# Patient Record
Sex: FEMALE | Race: WHITE | NOT HISPANIC OR LATINO | Employment: UNEMPLOYED | URBAN - METROPOLITAN AREA
[De-identification: names, ages, dates, MRNs, and addresses within clinical notes are randomized per-mention and may not be internally consistent; named-entity substitution may affect disease eponyms.]

---

## 2018-09-13 ENCOUNTER — OFFICE VISIT (OUTPATIENT)
Dept: INTERNAL MEDICINE | Facility: CLINIC | Age: 28
End: 2018-09-13
Payer: COMMERCIAL

## 2018-09-13 VITALS
BODY MASS INDEX: 41.29 KG/M2 | SYSTOLIC BLOOD PRESSURE: 116 MMHG | RESPIRATION RATE: 18 BRPM | DIASTOLIC BLOOD PRESSURE: 80 MMHG | WEIGHT: 247.81 LBS | HEART RATE: 81 BPM | HEIGHT: 65 IN | TEMPERATURE: 98 F

## 2018-09-13 DIAGNOSIS — E66.01 MORBID OBESITY: ICD-10-CM

## 2018-09-13 DIAGNOSIS — H60.90 OTITIS EXTERNA, UNSPECIFIED CHRONICITY, UNSPECIFIED LATERALITY, UNSPECIFIED TYPE: ICD-10-CM

## 2018-09-13 DIAGNOSIS — Z00.00 ROUTINE GENERAL MEDICAL EXAMINATION AT A HEALTH CARE FACILITY: Primary | ICD-10-CM

## 2018-09-13 DIAGNOSIS — F90.9 ATTENTION DEFICIT HYPERACTIVITY DISORDER (ADHD), UNSPECIFIED ADHD TYPE: ICD-10-CM

## 2018-09-13 PROCEDURE — 99395 PREV VISIT EST AGE 18-39: CPT | Mod: S$GLB,,, | Performed by: FAMILY MEDICINE

## 2018-09-13 PROCEDURE — 99999 PR PBB SHADOW E&M-NEW PATIENT-LVL III: CPT | Mod: PBBFAC,,, | Performed by: FAMILY MEDICINE

## 2018-09-13 RX ORDER — DEXTROAMPHETAMINE SACCHARATE, AMPHETAMINE ASPARTATE MONOHYDRATE, DEXTROAMPHETAMINE SULFATE AND AMPHETAMINE SULFATE 7.5; 7.5; 7.5; 7.5 MG/1; MG/1; MG/1; MG/1
30 CAPSULE, EXTENDED RELEASE ORAL EVERY MORNING
Qty: 30 CAPSULE | Refills: 0 | Status: SHIPPED | OUTPATIENT
Start: 2018-09-13 | End: 2018-10-16 | Stop reason: SDUPTHER

## 2018-09-13 RX ORDER — IBUPROFEN 200 MG
200 TABLET ORAL EVERY 6 HOURS PRN
COMMUNITY
End: 2018-09-13

## 2018-09-13 RX ORDER — DEXTROAMPHETAMINE SACCHARATE, AMPHETAMINE ASPARTATE MONOHYDRATE, DEXTROAMPHETAMINE SULFATE AND AMPHETAMINE SULFATE 7.5; 7.5; 7.5; 7.5 MG/1; MG/1; MG/1; MG/1
30 CAPSULE, EXTENDED RELEASE ORAL EVERY MORNING
COMMUNITY
End: 2018-09-13 | Stop reason: SDUPTHER

## 2018-09-13 RX ORDER — CIPROFLOXACIN AND DEXAMETHASONE 3; 1 MG/ML; MG/ML
4 SUSPENSION/ DROPS AURICULAR (OTIC) 2 TIMES DAILY
Qty: 7.5 ML | Refills: 2 | Status: SHIPPED | OUTPATIENT
Start: 2018-09-13 | End: 2018-12-13 | Stop reason: ALTCHOICE

## 2018-09-14 PROBLEM — E66.01 MORBID OBESITY: Status: ACTIVE | Noted: 2018-09-14

## 2018-09-14 PROBLEM — H60.90 OTITIS EXTERNA: Status: ACTIVE | Noted: 2018-09-14

## 2018-09-14 PROBLEM — E66.01 MORBID OBESITY: Chronic | Status: ACTIVE | Noted: 2018-09-14

## 2018-09-14 PROBLEM — F90.9 ATTENTION DEFICIT HYPERACTIVITY DISORDER (ADHD): Status: ACTIVE | Noted: 2018-09-14

## 2018-09-14 PROBLEM — F90.9 ATTENTION DEFICIT HYPERACTIVITY DISORDER (ADHD): Chronic | Status: ACTIVE | Noted: 2018-09-14

## 2018-09-14 NOTE — PROGRESS NOTES
Subjective:   Patient ID: Bozena Queen is a 27 y.o. female.    Chief Complaint: Annual Exam and Establish Care      HPI  Mrs. Queen is a very nice 26 yo female here to est w new pcp and for annual exam.     Patient queried and denies any further complaints    PREVENTIVE MED  Diet  Exercise  Colorectal Ca  Alcohol use  Tobacco  BP  Depression  Type 2 DM  Hep C  STD  Vision  ALL REVIEWED        PAST MEDICAL HISTORY:  History reviewed. No pertinent past medical history.    PAST SURGICAL HISTORY:  History reviewed. No pertinent surgical history.    SOCIAL HISTORY:  Social History     Socioeconomic History    Marital status:      Spouse name: Not on file    Number of children: Not on file    Years of education: Not on file    Highest education level: Not on file   Social Needs    Financial resource strain: Not on file    Food insecurity - worry: Not on file    Food insecurity - inability: Not on file    Transportation needs - medical: Not on file    Transportation needs - non-medical: Not on file   Occupational History    Not on file   Tobacco Use    Smoking status: Never Smoker    Smokeless tobacco: Never Used   Substance and Sexual Activity    Alcohol use: Yes     Alcohol/week: 1.2 oz     Types: 2 Glasses of wine per week     Frequency: 2-4 times a month     Drinks per session: 1 or 2     Binge frequency: Never    Drug use: No    Sexual activity: Yes     Partners: Male     Birth control/protection: Condom   Other Topics Concern    Not on file   Social History Narrative    Not on file       FAMILY HISTORY:  Family History   Problem Relation Age of Onset    Hypertension Mother     No Known Problems Sister     No Known Problems Brother        ALLERGIES AND MEDICATIONS: updated and reviewed.  Review of patient's allergies indicates:  No Known Allergies    Current Outpatient Medications:     dextroamphetamine-amphetamine (ADDERALL XR) 30 MG 24 hr capsule, Take 1 capsule (30 mg total) by mouth  "every morning., Disp: 30 capsule, Rfl: 0    ciprofloxacin-dexamethasone 0.3-0.1% (CIPRODEX) 0.3-0.1 % DrpS, Place 4 drops into the right ear 2 (two) times daily. X 5- 7 days, Disp: 7.5 mL, Rfl: 2    Review of Systems   Constitutional: Negative for activity change, appetite change, chills, diaphoresis, fatigue, fever and unexpected weight change.   HENT: Negative for congestion, ear discharge, ear pain, facial swelling, hearing loss, nosebleeds, postnasal drip, rhinorrhea, sinus pressure, sneezing, sore throat, tinnitus, trouble swallowing and voice change.    Eyes: Negative for photophobia, pain, discharge, redness, itching and visual disturbance.   Respiratory: Negative for cough, chest tightness, shortness of breath and wheezing.    Cardiovascular: Negative for chest pain, palpitations and leg swelling.   Gastrointestinal: Negative for abdominal distention, abdominal pain, anal bleeding, blood in stool, constipation, diarrhea, nausea, rectal pain and vomiting.   Endocrine: Negative for cold intolerance, heat intolerance, polydipsia, polyphagia and polyuria.   Genitourinary: Negative for difficulty urinating, dysuria and flank pain.   Musculoskeletal: Negative for arthralgias, back pain, joint swelling, myalgias and neck pain.   Skin: Negative for rash.   Neurological: Negative for dizziness, tremors, seizures, syncope, speech difficulty, weakness, light-headedness, numbness and headaches.   Psychiatric/Behavioral: Negative for behavioral problems, confusion, decreased concentration, dysphoric mood, sleep disturbance and suicidal ideas. The patient is not nervous/anxious and is not hyperactive.        Objective:     Vitals:    09/13/18 1311   BP: 116/80   Pulse: 81   Resp: 18   Temp: 98 °F (36.7 °C)   TempSrc: Oral   Weight: 112.4 kg (247 lb 12.8 oz)   Height: 5' 5" (1.651 m)   PainSc:   3   PainLoc: Ear     Body mass index is 41.24 kg/m².    Physical Exam   Constitutional: She is oriented to person, place, and " time. She appears well-developed and well-nourished. She is cooperative. She does not have a sickly appearance. No distress.   HENT:   Head: Normocephalic and atraumatic.   Right Ear: Hearing, tympanic membrane, external ear and ear canal normal. No tenderness.   Left Ear: Hearing, tympanic membrane, external ear and ear canal normal. No tenderness.   Nose: Nose normal.   Mouth/Throat: Oropharynx is clear and moist.   Eyes: Conjunctivae and lids are normal. Pupils are equal, round, and reactive to light. Right eye exhibits no discharge. Left eye exhibits no discharge. Right conjunctiva is not injected. Left conjunctiva is not injected. No scleral icterus. Right eye exhibits normal extraocular motion. Left eye exhibits normal extraocular motion.   Neck: Normal range of motion. Neck supple. No JVD present. Carotid bruit is not present. No tracheal deviation and no edema present. No thyromegaly present.   Cardiovascular: Normal rate, regular rhythm, normal heart sounds and normal pulses. Exam reveals no friction rub.   No murmur heard.  Pulmonary/Chest: Effort normal and breath sounds normal. No accessory muscle usage. No respiratory distress. She has no wheezes. She has no rhonchi. She has no rales.   Abdominal: Soft. Bowel sounds are normal. She exhibits no distension, no abdominal bruit, no pulsatile midline mass and no mass. There is no hepatosplenomegaly. There is no tenderness. There is no rebound, no guarding, no CVA tenderness, no tenderness at McBurney's point and negative Wade's sign.   Musculoskeletal: She exhibits no edema.   Lymphadenopathy:        Head (right side): No submandibular, no preauricular and no posterior auricular adenopathy present.        Head (left side): No submandibular, no preauricular and no posterior auricular adenopathy present.     She has no cervical adenopathy.   Neurological: She is alert and oriented to person, place, and time. GCS eye subscore is 4. GCS verbal subscore is 5.  GCS motor subscore is 6.   Skin: Skin is warm and dry. No ecchymosis and no rash noted. Rash is not maculopapular and not urticarial. She is not diaphoretic. No cyanosis or erythema. Nails show no clubbing.   Psychiatric: She has a normal mood and affect. Her speech is normal and behavior is normal. Thought content normal. Her mood appears not anxious. Her affect is not angry and not inappropriate. She does not exhibit a depressed mood.   Vitals reviewed.      Assessment and Plan:   Bozena was seen today for annual exam and establish care.    Diagnoses and all orders for this visit:    Routine general medical examination at a [facility] facility    Attention deficit hyperactivity disorder (ADHD), unspecified ADHD type    BMI 40.0-44.9, adult    Morbid obesity    Otitis externa, unspecified chronicity, unspecified laterality, unspecified type    Other orders  -     dextroamphetamine-amphetamine (ADDERALL XR) 30 MG 24 hr capsule; Take 1 capsule (30 mg total) by mouth every morning.  -     ciprofloxacin-dexamethasone 0.3-0.1% (CIPRODEX) 0.3-0.1 % DrpS; Place 4 drops into the right ear 2 (two) times daily. X 5- 7 days        Follow-up in about 3 months (around 12/13/2018).      THIS NOTE WILL BE SHARED WITH THE PATIENT.

## 2018-10-16 RX ORDER — DEXTROAMPHETAMINE SACCHARATE, AMPHETAMINE ASPARTATE MONOHYDRATE, DEXTROAMPHETAMINE SULFATE AND AMPHETAMINE SULFATE 7.5; 7.5; 7.5; 7.5 MG/1; MG/1; MG/1; MG/1
30 CAPSULE, EXTENDED RELEASE ORAL EVERY MORNING
Qty: 30 CAPSULE | Refills: 0 | Status: SHIPPED | OUTPATIENT
Start: 2018-10-16 | End: 2018-11-19 | Stop reason: SDUPTHER

## 2018-11-19 RX ORDER — DEXTROAMPHETAMINE SACCHARATE, AMPHETAMINE ASPARTATE MONOHYDRATE, DEXTROAMPHETAMINE SULFATE AND AMPHETAMINE SULFATE 7.5; 7.5; 7.5; 7.5 MG/1; MG/1; MG/1; MG/1
30 CAPSULE, EXTENDED RELEASE ORAL EVERY MORNING
Qty: 30 CAPSULE | Refills: 0 | Status: SHIPPED | OUTPATIENT
Start: 2018-11-19 | End: 2018-12-13 | Stop reason: SDUPTHER

## 2018-12-13 ENCOUNTER — OFFICE VISIT (OUTPATIENT)
Dept: INTERNAL MEDICINE | Facility: CLINIC | Age: 28
End: 2018-12-13
Payer: COMMERCIAL

## 2018-12-13 DIAGNOSIS — F90.9 ATTENTION DEFICIT HYPERACTIVITY DISORDER (ADHD), UNSPECIFIED ADHD TYPE: Primary | Chronic | ICD-10-CM

## 2018-12-13 PROCEDURE — 99213 OFFICE O/P EST LOW 20 MIN: CPT | Mod: S$GLB,,, | Performed by: FAMILY MEDICINE

## 2018-12-13 PROCEDURE — 99999 PR PBB SHADOW E&M-EST. PATIENT-LVL III: CPT | Mod: PBBFAC,,, | Performed by: FAMILY MEDICINE

## 2018-12-13 PROCEDURE — 3008F BODY MASS INDEX DOCD: CPT | Mod: CPTII,S$GLB,, | Performed by: FAMILY MEDICINE

## 2018-12-13 RX ORDER — DEXTROAMPHETAMINE SACCHARATE, AMPHETAMINE ASPARTATE MONOHYDRATE, DEXTROAMPHETAMINE SULFATE AND AMPHETAMINE SULFATE 7.5; 7.5; 7.5; 7.5 MG/1; MG/1; MG/1; MG/1
30 CAPSULE, EXTENDED RELEASE ORAL EVERY MORNING
Qty: 30 CAPSULE | Refills: 0 | Status: SHIPPED | OUTPATIENT
Start: 2018-12-13 | End: 2019-01-23 | Stop reason: SDUPTHER

## 2018-12-17 VITALS
HEIGHT: 65 IN | SYSTOLIC BLOOD PRESSURE: 122 MMHG | BODY MASS INDEX: 40.11 KG/M2 | TEMPERATURE: 98 F | DIASTOLIC BLOOD PRESSURE: 84 MMHG | HEART RATE: 77 BPM | WEIGHT: 240.75 LBS

## 2018-12-17 PROBLEM — H60.90 OTITIS EXTERNA: Status: RESOLVED | Noted: 2018-09-14 | Resolved: 2018-12-17

## 2018-12-17 NOTE — PROGRESS NOTES
Subjective:   Patient ID: Bozena Queen is a 28 y.o. female.    Chief Complaint: Follow-up (3 month)      HPI  3 mo fu for adhd med.  Doing well  Patient queried and denies any further complaints.        ALLERGIES AND MEDICATIONS: updated and reviewed.  Review of patient's allergies indicates:  No Known Allergies    Current Outpatient Medications:     dextroamphetamine-amphetamine (ADDERALL XR) 30 MG 24 hr capsule, Take 1 capsule (30 mg total) by mouth every morning., Disp: 30 capsule, Rfl: 0    Review of Systems   Constitutional: Negative for activity change, appetite change, chills, diaphoresis, fatigue, fever and unexpected weight change.   HENT: Negative for congestion, ear discharge, ear pain, facial swelling, hearing loss, nosebleeds, postnasal drip, rhinorrhea, sinus pressure, sneezing, sore throat, tinnitus, trouble swallowing and voice change.    Eyes: Negative for photophobia, pain, discharge, redness, itching and visual disturbance.   Respiratory: Negative for cough, chest tightness, shortness of breath and wheezing.    Cardiovascular: Negative for chest pain, palpitations and leg swelling.   Gastrointestinal: Negative for abdominal distention, abdominal pain, anal bleeding, blood in stool, constipation, diarrhea, nausea, rectal pain and vomiting.   Endocrine: Negative for cold intolerance, heat intolerance, polydipsia, polyphagia and polyuria.   Genitourinary: Negative for difficulty urinating, dysuria and flank pain.   Musculoskeletal: Negative for arthralgias, back pain, joint swelling, myalgias and neck pain.   Skin: Negative for rash.   Neurological: Negative for dizziness, tremors, seizures, syncope, speech difficulty, weakness, light-headedness, numbness and headaches.   Psychiatric/Behavioral: Negative for behavioral problems, confusion, decreased concentration, dysphoric mood, sleep disturbance and suicidal ideas. The patient is not nervous/anxious and is not hyperactive.        Objective:  "    Vitals:    12/13/18 1548   BP: 122/84   Pulse: 77   Temp: 98.3 °F (36.8 °C)   TempSrc: Oral   Weight: 109.2 kg (240 lb 11.9 oz)   Height: 5' 5" (1.651 m)   PainSc: 0-No pain     Body mass index is 40.06 kg/m².    Physical Exam   Constitutional: She is oriented to person, place, and time. She appears well-developed and well-nourished. She is cooperative. She does not have a sickly appearance. No distress.   HENT:   Head: Normocephalic and atraumatic.   Right Ear: Hearing, tympanic membrane, external ear and ear canal normal. No tenderness.   Left Ear: Hearing, tympanic membrane, external ear and ear canal normal. No tenderness.   Nose: Nose normal.   Mouth/Throat: Oropharynx is clear and moist.   Eyes: Conjunctivae and lids are normal. Pupils are equal, round, and reactive to light. Right eye exhibits no discharge. Left eye exhibits no discharge. Right conjunctiva is not injected. Left conjunctiva is not injected. No scleral icterus. Right eye exhibits normal extraocular motion. Left eye exhibits normal extraocular motion.   Neck: Normal range of motion. Neck supple. No JVD present. Carotid bruit is not present. No tracheal deviation and no edema present. No thyromegaly present.   Cardiovascular: Normal rate, regular rhythm, normal heart sounds and normal pulses. Exam reveals no friction rub.   No murmur heard.  Pulmonary/Chest: Effort normal and breath sounds normal. No accessory muscle usage. No respiratory distress. She has no wheezes. She has no rhonchi. She has no rales.   Abdominal: Soft. Bowel sounds are normal. She exhibits no distension, no abdominal bruit, no pulsatile midline mass and no mass. There is no hepatosplenomegaly. There is no tenderness. There is no rebound, no guarding, no CVA tenderness, no tenderness at McBurney's point and negative Wade's sign.   Musculoskeletal: She exhibits no edema.   Lymphadenopathy:        Head (right side): No submandibular, no preauricular and no posterior " auricular adenopathy present.        Head (left side): No submandibular, no preauricular and no posterior auricular adenopathy present.     She has no cervical adenopathy.   Neurological: She is alert and oriented to person, place, and time. GCS eye subscore is 4. GCS verbal subscore is 5. GCS motor subscore is 6.   Skin: Skin is warm and dry. No ecchymosis and no rash noted. Rash is not maculopapular and not urticarial. She is not diaphoretic. No cyanosis or erythema. Nails show no clubbing.   Psychiatric: She has a normal mood and affect. Her speech is normal and behavior is normal. Thought content normal. Her mood appears not anxious. Her affect is not angry and not inappropriate. She does not exhibit a depressed mood.   Nursing note and vitals reviewed.      Assessment and Plan:   Bozena was seen today for follow-up.    Diagnoses and all orders for this visit:    Attention deficit hyperactivity disorder (ADHD), unspecified ADHD type    Other orders  -     dextroamphetamine-amphetamine (ADDERALL XR) 30 MG 24 hr capsule; Take 1 capsule (30 mg total) by mouth every morning.        Follow-up in about 3 months (around 3/13/2019), or if symptoms worsen or fail to improve.    THIS NOTE WILL BE SHARED WITH THE PATIENT.

## 2019-01-23 RX ORDER — DEXTROAMPHETAMINE SACCHARATE, AMPHETAMINE ASPARTATE MONOHYDRATE, DEXTROAMPHETAMINE SULFATE AND AMPHETAMINE SULFATE 7.5; 7.5; 7.5; 7.5 MG/1; MG/1; MG/1; MG/1
30 CAPSULE, EXTENDED RELEASE ORAL EVERY MORNING
Qty: 30 CAPSULE | Refills: 0 | Status: SHIPPED | OUTPATIENT
Start: 2019-01-23 | End: 2019-02-26 | Stop reason: SDUPTHER

## 2019-02-26 ENCOUNTER — TELEPHONE (OUTPATIENT)
Dept: INTERNAL MEDICINE | Facility: CLINIC | Age: 29
End: 2019-02-26

## 2019-02-26 ENCOUNTER — PATIENT MESSAGE (OUTPATIENT)
Dept: INTERNAL MEDICINE | Facility: CLINIC | Age: 29
End: 2019-02-26

## 2019-02-26 RX ORDER — DEXTROAMPHETAMINE SACCHARATE, AMPHETAMINE ASPARTATE MONOHYDRATE, DEXTROAMPHETAMINE SULFATE AND AMPHETAMINE SULFATE 7.5; 7.5; 7.5; 7.5 MG/1; MG/1; MG/1; MG/1
30 CAPSULE, EXTENDED RELEASE ORAL EVERY MORNING
Qty: 30 CAPSULE | Refills: 0 | Status: SHIPPED | OUTPATIENT
Start: 2019-02-26 | End: 2019-04-29 | Stop reason: SDUPTHER

## 2019-02-26 NOTE — TELEPHONE ENCOUNTER
----- Message from Jon Mckay MD sent at 2/26/2019 12:46 PM CST -----  ADD med refilled. She will be f/u by next refill. Please remind her thx

## 2019-04-29 ENCOUNTER — OFFICE VISIT (OUTPATIENT)
Dept: INTERNAL MEDICINE | Facility: CLINIC | Age: 29
End: 2019-04-29
Payer: COMMERCIAL

## 2019-04-29 VITALS
BODY MASS INDEX: 40.26 KG/M2 | OXYGEN SATURATION: 99 % | SYSTOLIC BLOOD PRESSURE: 108 MMHG | RESPIRATION RATE: 18 BRPM | DIASTOLIC BLOOD PRESSURE: 82 MMHG | WEIGHT: 241.63 LBS | HEART RATE: 96 BPM | HEIGHT: 65 IN

## 2019-04-29 DIAGNOSIS — F90.9 ATTENTION DEFICIT HYPERACTIVITY DISORDER (ADHD), UNSPECIFIED ADHD TYPE: Primary | Chronic | ICD-10-CM

## 2019-04-29 DIAGNOSIS — E66.01 MORBID OBESITY: Chronic | ICD-10-CM

## 2019-04-29 PROCEDURE — 3008F PR BODY MASS INDEX (BMI) DOCUMENTED: ICD-10-PCS | Mod: CPTII,S$GLB,, | Performed by: FAMILY MEDICINE

## 2019-04-29 PROCEDURE — 99999 PR PBB SHADOW E&M-EST. PATIENT-LVL III: CPT | Mod: PBBFAC,,, | Performed by: FAMILY MEDICINE

## 2019-04-29 PROCEDURE — 3008F BODY MASS INDEX DOCD: CPT | Mod: CPTII,S$GLB,, | Performed by: FAMILY MEDICINE

## 2019-04-29 PROCEDURE — 99213 PR OFFICE/OUTPT VISIT, EST, LEVL III, 20-29 MIN: ICD-10-PCS | Mod: S$GLB,,, | Performed by: FAMILY MEDICINE

## 2019-04-29 PROCEDURE — 99213 OFFICE O/P EST LOW 20 MIN: CPT | Mod: S$GLB,,, | Performed by: FAMILY MEDICINE

## 2019-04-29 PROCEDURE — 99999 PR PBB SHADOW E&M-EST. PATIENT-LVL III: ICD-10-PCS | Mod: PBBFAC,,, | Performed by: FAMILY MEDICINE

## 2019-04-29 RX ORDER — DEXTROAMPHETAMINE SACCHARATE, AMPHETAMINE ASPARTATE MONOHYDRATE, DEXTROAMPHETAMINE SULFATE AND AMPHETAMINE SULFATE 7.5; 7.5; 7.5; 7.5 MG/1; MG/1; MG/1; MG/1
30 CAPSULE, EXTENDED RELEASE ORAL EVERY MORNING
Qty: 30 CAPSULE | Refills: 0 | Status: SHIPPED | OUTPATIENT
Start: 2019-04-29 | End: 2019-06-14 | Stop reason: SDUPTHER

## 2019-04-30 NOTE — PROGRESS NOTES
Subjective:   Patient ID: Bozena Queen is a 28 y.o. female.    Chief Complaint: No chief complaint on file.      HPI  Here for add med f/u. Doing well    Patient queried and denies any further complaints.      ALLERGIES AND MEDICATIONS: updated and reviewed.  Review of patient's allergies indicates:  No Known Allergies    Current Outpatient Medications:     dextroamphetamine-amphetamine (ADDERALL XR) 30 MG 24 hr capsule, Take 1 capsule (30 mg total) by mouth every morning., Disp: 30 capsule, Rfl: 0    Review of Systems   Constitutional: Negative for activity change, appetite change, chills, diaphoresis, fatigue, fever and unexpected weight change.   HENT: Negative for congestion, ear discharge, ear pain, facial swelling, hearing loss, nosebleeds, postnasal drip, rhinorrhea, sinus pressure, sneezing, sore throat, tinnitus, trouble swallowing and voice change.    Eyes: Negative for photophobia, pain, discharge, redness, itching and visual disturbance.   Respiratory: Negative for cough, chest tightness, shortness of breath and wheezing.    Cardiovascular: Negative for chest pain, palpitations and leg swelling.   Gastrointestinal: Negative for abdominal distention, abdominal pain, anal bleeding, blood in stool, constipation, diarrhea, nausea, rectal pain and vomiting.   Endocrine: Negative for cold intolerance, heat intolerance, polydipsia, polyphagia and polyuria.   Genitourinary: Negative for difficulty urinating, dysuria and flank pain.   Musculoskeletal: Negative for arthralgias, back pain, joint swelling, myalgias and neck pain.   Skin: Negative for rash.   Neurological: Negative for dizziness, tremors, seizures, syncope, speech difficulty, weakness, light-headedness, numbness and headaches.   Psychiatric/Behavioral: Negative for behavioral problems, confusion, decreased concentration, dysphoric mood, sleep disturbance and suicidal ideas. The patient is not nervous/anxious and is not hyperactive.        Objective:  "    Vitals:    04/29/19 1550   BP: 108/82   Pulse: 96   Resp: 18   SpO2: 99%   Weight: 109.6 kg (241 lb 10 oz)   Height: 5' 5" (1.651 m)     Body mass index is 40.21 kg/m².    Physical Exam   Constitutional: She appears well-developed and well-nourished.   HENT:   Head: Normocephalic.   Skin: Skin is warm and dry.   Psychiatric: She has a normal mood and affect. Her behavior is normal.   Nursing note and vitals reviewed.      Assessment and Plan:   Diagnoses and all orders for this visit:    Attention deficit hyperactivity disorder (ADHD), unspecified ADHD type    Morbid obesity    Other orders  -     dextroamphetamine-amphetamine (ADDERALL XR) 30 MG 24 hr capsule; Take 1 capsule (30 mg total) by mouth every morning.        Follow up in about 3 months (around 7/29/2019).    THIS NOTE WILL BE SHARED WITH THE PATIENT.        "

## 2019-06-14 RX ORDER — DEXTROAMPHETAMINE SACCHARATE, AMPHETAMINE ASPARTATE MONOHYDRATE, DEXTROAMPHETAMINE SULFATE AND AMPHETAMINE SULFATE 7.5; 7.5; 7.5; 7.5 MG/1; MG/1; MG/1; MG/1
30 CAPSULE, EXTENDED RELEASE ORAL EVERY MORNING
Qty: 30 CAPSULE | Refills: 0 | Status: SHIPPED | OUTPATIENT
Start: 2019-06-14 | End: 2019-07-26 | Stop reason: SDUPTHER

## 2019-07-26 ENCOUNTER — OFFICE VISIT (OUTPATIENT)
Dept: PRIMARY CARE CLINIC | Facility: CLINIC | Age: 29
End: 2019-07-26
Payer: COMMERCIAL

## 2019-07-26 VITALS
HEART RATE: 79 BPM | HEIGHT: 65 IN | WEIGHT: 238.56 LBS | SYSTOLIC BLOOD PRESSURE: 128 MMHG | DIASTOLIC BLOOD PRESSURE: 88 MMHG | TEMPERATURE: 98 F | BODY MASS INDEX: 39.75 KG/M2

## 2019-07-26 DIAGNOSIS — E66.9 OBESITY (BMI 30-39.9): ICD-10-CM

## 2019-07-26 DIAGNOSIS — F90.0 ATTENTION DEFICIT HYPERACTIVITY DISORDER (ADHD), PREDOMINANTLY INATTENTIVE TYPE: Primary | Chronic | ICD-10-CM

## 2019-07-26 DIAGNOSIS — R10.11 RUQ ABDOMINAL PAIN: ICD-10-CM

## 2019-07-26 PROBLEM — E66.01 MORBID OBESITY: Chronic | Status: RESOLVED | Noted: 2018-09-14 | Resolved: 2019-07-26

## 2019-07-26 PROCEDURE — 99214 PR OFFICE/OUTPT VISIT, EST, LEVL IV, 30-39 MIN: ICD-10-PCS | Mod: S$GLB,,, | Performed by: FAMILY MEDICINE

## 2019-07-26 PROCEDURE — 3008F PR BODY MASS INDEX (BMI) DOCUMENTED: ICD-10-PCS | Mod: CPTII,S$GLB,, | Performed by: FAMILY MEDICINE

## 2019-07-26 PROCEDURE — 99214 OFFICE O/P EST MOD 30 MIN: CPT | Mod: S$GLB,,, | Performed by: FAMILY MEDICINE

## 2019-07-26 PROCEDURE — 99999 PR PBB SHADOW E&M-EST. PATIENT-LVL III: ICD-10-PCS | Mod: PBBFAC,,, | Performed by: FAMILY MEDICINE

## 2019-07-26 PROCEDURE — 99999 PR PBB SHADOW E&M-EST. PATIENT-LVL III: CPT | Mod: PBBFAC,,, | Performed by: FAMILY MEDICINE

## 2019-07-26 PROCEDURE — 3008F BODY MASS INDEX DOCD: CPT | Mod: CPTII,S$GLB,, | Performed by: FAMILY MEDICINE

## 2019-07-26 RX ORDER — DEXTROAMPHETAMINE SACCHARATE, AMPHETAMINE ASPARTATE MONOHYDRATE, DEXTROAMPHETAMINE SULFATE AND AMPHETAMINE SULFATE 7.5; 7.5; 7.5; 7.5 MG/1; MG/1; MG/1; MG/1
30 CAPSULE, EXTENDED RELEASE ORAL EVERY MORNING
Qty: 30 CAPSULE | Refills: 0 | Status: SHIPPED | OUTPATIENT
Start: 2019-07-26 | End: 2019-08-26 | Stop reason: SDUPTHER

## 2019-07-26 NOTE — PROGRESS NOTES
Subjective:   Patient ID: Bozena Queen is a 28 y.o. female.    Chief Complaint: ADHD (follow up)      HPI  Here for fu of add meds. Also having some intermittent and sometimes severe ruq pain eric aw eating. Has multiple family members with severe gallstones and cholecystectomies.    Patient queried and denies any further complaints.      ALLERGIES AND MEDICATIONS: updated and reviewed.  Review of patient's allergies indicates:  No Known Allergies    Current Outpatient Medications:     dextroamphetamine-amphetamine (ADDERALL XR) 30 MG 24 hr capsule, Take 1 capsule (30 mg total) by mouth every morning., Disp: 30 capsule, Rfl: 0    Review of Systems   Constitutional: Negative for activity change, appetite change, chills, diaphoresis, fatigue, fever and unexpected weight change.   HENT: Negative for congestion, ear discharge, ear pain, facial swelling, hearing loss, nosebleeds, postnasal drip, rhinorrhea, sinus pressure, sneezing, sore throat, tinnitus, trouble swallowing and voice change.    Eyes: Negative for photophobia, pain, discharge, redness, itching and visual disturbance.   Respiratory: Negative for cough, chest tightness, shortness of breath and wheezing.    Cardiovascular: Negative for chest pain, palpitations and leg swelling.   Gastrointestinal: Positive for abdominal pain (none today. see hpi). Negative for abdominal distention, anal bleeding, blood in stool, constipation, diarrhea, nausea, rectal pain and vomiting.   Endocrine: Negative for cold intolerance, heat intolerance, polydipsia, polyphagia and polyuria.   Genitourinary: Negative for difficulty urinating, dysuria and flank pain.   Musculoskeletal: Negative for arthralgias, back pain, joint swelling, myalgias and neck pain.   Skin: Negative for rash.   Neurological: Negative for dizziness, tremors, seizures, syncope, speech difficulty, weakness, light-headedness, numbness and headaches.   Psychiatric/Behavioral: Negative for behavioral problems,  "confusion, decreased concentration, dysphoric mood, sleep disturbance and suicidal ideas. The patient is not nervous/anxious and is not hyperactive.        Objective:     Vitals:    07/26/19 0940   BP: 128/88   Pulse: 79   Temp: 97.9 °F (36.6 °C)   TempSrc: Oral   Weight: 108.2 kg (238 lb 8.6 oz)   Height: 5' 5" (1.651 m)   PainSc: 0-No pain     Body mass index is 39.69 kg/m².    Physical Exam   Constitutional: She is oriented to person, place, and time. She appears well-developed and well-nourished. She is cooperative. She does not have a sickly appearance. No distress.   HENT:   Head: Normocephalic and atraumatic.   Right Ear: Hearing, tympanic membrane, external ear and ear canal normal. No tenderness.   Left Ear: Hearing, tympanic membrane, external ear and ear canal normal. No tenderness.   Nose: Nose normal.   Mouth/Throat: Oropharynx is clear and moist. Normal dentition. No oropharyngeal exudate, posterior oropharyngeal edema or posterior oropharyngeal erythema.   Eyes: Conjunctivae and lids are normal. Right eye exhibits no discharge. Left eye exhibits no discharge. Right conjunctiva is not injected. Left conjunctiva is not injected. No scleral icterus. Right eye exhibits normal extraocular motion. Left eye exhibits normal extraocular motion.   Neck: Normal range of motion. Neck supple. No JVD present. Carotid bruit is not present. No tracheal deviation and no edema present. No thyromegaly present.   Cardiovascular: Normal rate, regular rhythm, normal heart sounds and normal pulses. Exam reveals no friction rub.   No murmur heard.  Pulmonary/Chest: Effort normal and breath sounds normal. No accessory muscle usage. No respiratory distress. She has no wheezes. She has no rhonchi. She has no rales.   Abdominal: Soft. Bowel sounds are normal. She exhibits no distension. There is tenderness.   Musculoskeletal: She exhibits no edema.   Lymphadenopathy:        Head (right side): No submandibular adenopathy present. "        Head (left side): No submandibular adenopathy present.     She has no cervical adenopathy.   Neurological: She is alert and oriented to person, place, and time.   Skin: Skin is warm and dry. She is not diaphoretic.   Psychiatric: Her speech is normal and behavior is normal. Thought content normal. Her mood appears not anxious. Her affect is not angry, not labile and not inappropriate. She does not exhibit a depressed mood.   Nursing note and vitals reviewed.      Assessment and Plan:   Bozena was seen today for adhd.    Diagnoses and all orders for this visit:    Attention deficit hyperactivity disorder (ADHD), predominantly inattentive type    RUQ abdominal pain    Obesity (BMI 30-39.9)    Other orders  -     dextroamphetamine-amphetamine (ADDERALL XR) 30 MG 24 hr capsule; Take 1 capsule (30 mg total) by mouth every morning.      Wt loss, low fat diet, consider abd u/s and fasting labs.      Follow up in about 3 months (around 10/26/2019).    THIS NOTE WILL BE SHARED WITH THE PATIENT.

## 2019-08-26 RX ORDER — DEXTROAMPHETAMINE SACCHARATE, AMPHETAMINE ASPARTATE MONOHYDRATE, DEXTROAMPHETAMINE SULFATE AND AMPHETAMINE SULFATE 7.5; 7.5; 7.5; 7.5 MG/1; MG/1; MG/1; MG/1
30 CAPSULE, EXTENDED RELEASE ORAL EVERY MORNING
Qty: 30 CAPSULE | Refills: 0 | Status: SHIPPED | OUTPATIENT
Start: 2019-08-26 | End: 2019-09-30 | Stop reason: SDUPTHER

## 2019-10-01 RX ORDER — DEXTROAMPHETAMINE SACCHARATE, AMPHETAMINE ASPARTATE MONOHYDRATE, DEXTROAMPHETAMINE SULFATE AND AMPHETAMINE SULFATE 7.5; 7.5; 7.5; 7.5 MG/1; MG/1; MG/1; MG/1
30 CAPSULE, EXTENDED RELEASE ORAL EVERY MORNING
Qty: 30 CAPSULE | Refills: 0 | Status: SHIPPED | OUTPATIENT
Start: 2019-10-01 | End: 2019-11-04 | Stop reason: SDUPTHER

## 2019-10-21 ENCOUNTER — PATIENT OUTREACH (OUTPATIENT)
Dept: ADMINISTRATIVE | Facility: HOSPITAL | Age: 29
End: 2019-10-21

## 2019-10-21 DIAGNOSIS — Z13.220 ENCOUNTER FOR LIPID SCREENING FOR CARDIOVASCULAR DISEASE: Primary | ICD-10-CM

## 2019-10-21 DIAGNOSIS — Z13.6 ENCOUNTER FOR LIPID SCREENING FOR CARDIOVASCULAR DISEASE: Primary | ICD-10-CM

## 2019-10-21 NOTE — PROGRESS NOTES
Pre-visit chart review completed. Pt eligible/ due for  Topic    Lipid Panel     Pap Smear with HPV Cotest     Influenza Vaccine (1)     Lipid panel ordered.    Message sent to patient in regards to pap test.

## 2019-11-04 ENCOUNTER — LAB VISIT (OUTPATIENT)
Dept: LAB | Facility: HOSPITAL | Age: 29
End: 2019-11-04
Attending: FAMILY MEDICINE
Payer: COMMERCIAL

## 2019-11-04 ENCOUNTER — OFFICE VISIT (OUTPATIENT)
Dept: PRIMARY CARE CLINIC | Facility: CLINIC | Age: 29
End: 2019-11-04
Payer: COMMERCIAL

## 2019-11-04 VITALS
WEIGHT: 240.94 LBS | DIASTOLIC BLOOD PRESSURE: 84 MMHG | BODY MASS INDEX: 40.14 KG/M2 | TEMPERATURE: 99 F | HEIGHT: 65 IN | HEART RATE: 74 BPM | OXYGEN SATURATION: 99 % | SYSTOLIC BLOOD PRESSURE: 130 MMHG

## 2019-11-04 DIAGNOSIS — F90.0 ATTENTION DEFICIT HYPERACTIVITY DISORDER (ADHD), PREDOMINANTLY INATTENTIVE TYPE: ICD-10-CM

## 2019-11-04 DIAGNOSIS — Z23 NEED FOR INFLUENZA VACCINATION: ICD-10-CM

## 2019-11-04 DIAGNOSIS — Z12.83 SKIN CANCER SCREENING: ICD-10-CM

## 2019-11-04 DIAGNOSIS — Z00.00 ROUTINE MEDICAL EXAM: Primary | ICD-10-CM

## 2019-11-04 DIAGNOSIS — Z00.00 ROUTINE MEDICAL EXAM: ICD-10-CM

## 2019-11-04 PROBLEM — E66.9 OBESITY (BMI 30-39.9): Status: RESOLVED | Noted: 2019-07-26 | Resolved: 2019-11-04

## 2019-11-04 PROBLEM — R10.11 RUQ ABDOMINAL PAIN: Status: RESOLVED | Noted: 2019-07-26 | Resolved: 2019-11-04

## 2019-11-04 LAB
ALBUMIN SERPL BCP-MCNC: 3.8 G/DL (ref 3.5–5.2)
ALP SERPL-CCNC: 68 U/L (ref 55–135)
ALT SERPL W/O P-5'-P-CCNC: 11 U/L (ref 10–44)
ANION GAP SERPL CALC-SCNC: 6 MMOL/L (ref 8–16)
AST SERPL-CCNC: 17 U/L (ref 10–40)
BASOPHILS # BLD AUTO: 0.04 K/UL (ref 0–0.2)
BASOPHILS NFR BLD: 0.5 % (ref 0–1.9)
BILIRUB SERPL-MCNC: 0.5 MG/DL (ref 0.1–1)
BUN SERPL-MCNC: 9 MG/DL (ref 6–20)
CALCIUM SERPL-MCNC: 9.5 MG/DL (ref 8.7–10.5)
CHLORIDE SERPL-SCNC: 105 MMOL/L (ref 95–110)
CHOLEST SERPL-MCNC: 180 MG/DL (ref 120–199)
CHOLEST/HDLC SERPL: 3.9 {RATIO} (ref 2–5)
CO2 SERPL-SCNC: 26 MMOL/L (ref 23–29)
CREAT SERPL-MCNC: 0.8 MG/DL (ref 0.5–1.4)
DIFFERENTIAL METHOD: ABNORMAL
EOSINOPHIL # BLD AUTO: 0.1 K/UL (ref 0–0.5)
EOSINOPHIL NFR BLD: 1.3 % (ref 0–8)
ERYTHROCYTE [DISTWIDTH] IN BLOOD BY AUTOMATED COUNT: 13.1 % (ref 11.5–14.5)
EST. GFR  (AFRICAN AMERICAN): >60 ML/MIN/1.73 M^2
EST. GFR  (NON AFRICAN AMERICAN): >60 ML/MIN/1.73 M^2
ESTIMATED AVG GLUCOSE: 100 MG/DL (ref 68–131)
GLUCOSE SERPL-MCNC: 99 MG/DL (ref 70–110)
HBA1C MFR BLD HPLC: 5.1 % (ref 4–5.6)
HCT VFR BLD AUTO: 40.8 % (ref 37–48.5)
HDLC SERPL-MCNC: 46 MG/DL (ref 40–75)
HDLC SERPL: 25.6 % (ref 20–50)
HGB BLD-MCNC: 13 G/DL (ref 12–16)
IMM GRANULOCYTES # BLD AUTO: 0.02 K/UL (ref 0–0.04)
IMM GRANULOCYTES NFR BLD AUTO: 0.3 % (ref 0–0.5)
LDLC SERPL CALC-MCNC: 109.2 MG/DL (ref 63–159)
LYMPHOCYTES # BLD AUTO: 1.6 K/UL (ref 1–4.8)
LYMPHOCYTES NFR BLD: 20.2 % (ref 18–48)
MCH RBC QN AUTO: 29 PG (ref 27–31)
MCHC RBC AUTO-ENTMCNC: 31.9 G/DL (ref 32–36)
MCV RBC AUTO: 91 FL (ref 82–98)
MONOCYTES # BLD AUTO: 0.9 K/UL (ref 0.3–1)
MONOCYTES NFR BLD: 10.7 % (ref 4–15)
NEUTROPHILS # BLD AUTO: 5.3 K/UL (ref 1.8–7.7)
NEUTROPHILS NFR BLD: 67 % (ref 38–73)
NONHDLC SERPL-MCNC: 134 MG/DL
NRBC BLD-RTO: 0 /100 WBC
PLATELET # BLD AUTO: 381 K/UL (ref 150–350)
PMV BLD AUTO: 9.9 FL (ref 9.2–12.9)
POTASSIUM SERPL-SCNC: 4.3 MMOL/L (ref 3.5–5.1)
PROT SERPL-MCNC: 7.4 G/DL (ref 6–8.4)
RBC # BLD AUTO: 4.49 M/UL (ref 4–5.4)
SODIUM SERPL-SCNC: 137 MMOL/L (ref 136–145)
TRIGL SERPL-MCNC: 124 MG/DL (ref 30–150)
TSH SERPL DL<=0.005 MIU/L-ACNC: 1.94 UIU/ML (ref 0.4–4)
WBC # BLD AUTO: 7.92 K/UL (ref 3.9–12.7)

## 2019-11-04 PROCEDURE — 99395 PREV VISIT EST AGE 18-39: CPT | Mod: 25,S$GLB,, | Performed by: FAMILY MEDICINE

## 2019-11-04 PROCEDURE — 90686 IIV4 VACC NO PRSV 0.5 ML IM: CPT | Mod: S$GLB,,, | Performed by: FAMILY MEDICINE

## 2019-11-04 PROCEDURE — 36415 COLL VENOUS BLD VENIPUNCTURE: CPT | Mod: PN

## 2019-11-04 PROCEDURE — 85025 COMPLETE CBC W/AUTO DIFF WBC: CPT

## 2019-11-04 PROCEDURE — 83036 HEMOGLOBIN GLYCOSYLATED A1C: CPT

## 2019-11-04 PROCEDURE — 80053 COMPREHEN METABOLIC PANEL: CPT

## 2019-11-04 PROCEDURE — 90471 FLU VACCINE (QUAD) GREATER THAN OR EQUAL TO 3YO PRESERVATIVE FREE IM: ICD-10-PCS | Mod: S$GLB,,, | Performed by: FAMILY MEDICINE

## 2019-11-04 PROCEDURE — 90686 FLU VACCINE (QUAD) GREATER THAN OR EQUAL TO 3YO PRESERVATIVE FREE IM: ICD-10-PCS | Mod: S$GLB,,, | Performed by: FAMILY MEDICINE

## 2019-11-04 PROCEDURE — 80061 LIPID PANEL: CPT

## 2019-11-04 PROCEDURE — 90471 IMMUNIZATION ADMIN: CPT | Mod: S$GLB,,, | Performed by: FAMILY MEDICINE

## 2019-11-04 PROCEDURE — 99395 PR PREVENTIVE VISIT,EST,18-39: ICD-10-PCS | Mod: 25,S$GLB,, | Performed by: FAMILY MEDICINE

## 2019-11-04 PROCEDURE — 99999 PR PBB SHADOW E&M-EST. PATIENT-LVL IV: ICD-10-PCS | Mod: PBBFAC,,, | Performed by: FAMILY MEDICINE

## 2019-11-04 PROCEDURE — 99999 PR PBB SHADOW E&M-EST. PATIENT-LVL IV: CPT | Mod: PBBFAC,,, | Performed by: FAMILY MEDICINE

## 2019-11-04 PROCEDURE — 84443 ASSAY THYROID STIM HORMONE: CPT

## 2019-11-04 RX ORDER — DEXTROAMPHETAMINE SACCHARATE, AMPHETAMINE ASPARTATE MONOHYDRATE, DEXTROAMPHETAMINE SULFATE AND AMPHETAMINE SULFATE 7.5; 7.5; 7.5; 7.5 MG/1; MG/1; MG/1; MG/1
30 CAPSULE, EXTENDED RELEASE ORAL EVERY MORNING
Qty: 30 CAPSULE | Refills: 0 | Status: SHIPPED | OUTPATIENT
Start: 2019-11-04 | End: 2019-12-17 | Stop reason: SDUPTHER

## 2019-11-04 NOTE — PROGRESS NOTES
Subjective:   Patient ID: Bozena Queen is a 29 y.o. female.    Chief Complaint: Annual Exam      HPI  30 yo female with ADD here for annual exam. Doing very well.    Patient queried and denies any further complaints.    PREVENTIVE MED  Diet  Exercise  Alcohol use  Tobacco  BP  Type 2 DM  Vision  ALL REVIEWED      ALLERGIES AND MEDICATIONS: updated and reviewed.  Review of patient's allergies indicates:  No Known Allergies    Current Outpatient Medications:     dextroamphetamine-amphetamine (ADDERALL XR) 30 MG 24 hr capsule, Take 1 capsule (30 mg total) by mouth every morning., Disp: 30 capsule, Rfl: 0    Review of Systems   Constitutional: Negative for activity change, appetite change, chills, diaphoresis, fatigue, fever and unexpected weight change.   HENT: Negative for congestion, ear discharge, ear pain, facial swelling, hearing loss, nosebleeds, postnasal drip, rhinorrhea, sinus pressure, sneezing, sore throat, tinnitus, trouble swallowing and voice change.    Eyes: Negative for photophobia, pain, discharge, redness, itching and visual disturbance.   Respiratory: Negative for cough, chest tightness, shortness of breath and wheezing.    Cardiovascular: Negative for chest pain, palpitations and leg swelling.   Gastrointestinal: Negative for abdominal distention, abdominal pain, anal bleeding, blood in stool, constipation, diarrhea, nausea, rectal pain and vomiting.   Endocrine: Negative for cold intolerance, heat intolerance, polydipsia, polyphagia and polyuria.   Genitourinary: Negative for difficulty urinating, dysuria and flank pain.   Musculoskeletal: Negative for arthralgias, back pain, joint swelling, myalgias and neck pain.   Skin: Negative for rash.   Neurological: Negative for dizziness, tremors, seizures, syncope, speech difficulty, weakness, light-headedness, numbness and headaches.   Psychiatric/Behavioral: Negative for behavioral problems, confusion, decreased concentration, dysphoric mood, sleep  "disturbance and suicidal ideas. The patient is not nervous/anxious and is not hyperactive.        Objective:     Vitals:    11/04/19 0724   BP: 130/84   Pulse: 74   Temp: 98.6 °F (37 °C)   TempSrc: Oral   SpO2: 99%   Weight: 109.3 kg (240 lb 15.4 oz)   Height: 5' 5" (1.651 m)   PainSc: 0-No pain     Body mass index is 40.1 kg/m².    Physical Exam   Constitutional: She is oriented to person, place, and time. She appears well-developed and well-nourished. She is cooperative. She does not have a sickly appearance. No distress.   HENT:   Head: Normocephalic and atraumatic.   Right Ear: Hearing, tympanic membrane, external ear and ear canal normal. No tenderness.   Left Ear: Hearing, tympanic membrane, external ear and ear canal normal. No tenderness.   Nose: Nose normal.   Mouth/Throat: Oropharynx is clear and moist.   Eyes: Pupils are equal, round, and reactive to light. Conjunctivae and lids are normal. Right eye exhibits no discharge. Left eye exhibits no discharge. Right conjunctiva is not injected. Left conjunctiva is not injected. No scleral icterus. Right eye exhibits normal extraocular motion. Left eye exhibits normal extraocular motion.   Neck: Normal range of motion. Neck supple. No JVD present. Carotid bruit is not present. No tracheal deviation and no edema present. No thyromegaly present.   Cardiovascular: Normal rate, regular rhythm, normal heart sounds and normal pulses. Exam reveals no friction rub.   No murmur heard.  Pulmonary/Chest: Effort normal and breath sounds normal. No accessory muscle usage. No respiratory distress. She has no wheezes. She has no rhonchi. She has no rales.   Abdominal: Soft. Bowel sounds are normal. She exhibits no distension, no abdominal bruit, no pulsatile midline mass and no mass. There is no hepatosplenomegaly. There is no tenderness. There is no rebound, no guarding, no CVA tenderness, no tenderness at McBurney's point and negative Wade's sign.   Musculoskeletal: She " exhibits no edema.   Lymphadenopathy:        Head (right side): No submandibular, no preauricular and no posterior auricular adenopathy present.        Head (left side): No submandibular, no preauricular and no posterior auricular adenopathy present.     She has no cervical adenopathy.   Neurological: She is alert and oriented to person, place, and time. GCS eye subscore is 4. GCS verbal subscore is 5. GCS motor subscore is 6.   Skin: Skin is warm and dry. No ecchymosis and no rash noted. Rash is not maculopapular and not urticarial. She is not diaphoretic. No cyanosis or erythema. Nails show no clubbing.   Psychiatric: She has a normal mood and affect. Her speech is normal and behavior is normal. Thought content normal. Her mood appears not anxious. Her affect is not angry and not inappropriate. She does not exhibit a depressed mood.   Nursing note and vitals reviewed.      Assessment and Plan:   Bozena was seen today for annual exam.    Diagnoses and all orders for this visit:    Routine medical exam  -     CBC auto differential; Future  -     Comprehensive metabolic panel; Future  -     Hemoglobin A1c; Future  -     Lipid panel; Future  -     TSH; Future  -     Ambulatory referral to Optometry    Attention deficit hyperactivity disorder (ADHD), predominantly inattentive type    Need for influenza vaccination  -     Influenza - Quadrivalent (6 months+) (PF)    Skin cancer screening  -     Ambulatory consult to Dermatology    BMI 39.0-39.9,adult    Other orders  -     dextroamphetamine-amphetamine (ADDERALL XR) 30 MG 24 hr capsule; Take 1 capsule (30 mg total) by mouth every morning.        No follow-ups on file.    THIS NOTE WILL BE SHARED WITH THE PATIENT.

## 2019-11-05 ENCOUNTER — PATIENT MESSAGE (OUTPATIENT)
Dept: PRIMARY CARE CLINIC | Facility: CLINIC | Age: 29
End: 2019-11-05

## 2019-11-20 ENCOUNTER — TELEPHONE (OUTPATIENT)
Dept: ADMINISTRATIVE | Facility: OTHER | Age: 29
End: 2019-11-20

## 2019-11-20 NOTE — TELEPHONE ENCOUNTER
Left voice message for patient to return call to schedule appointment from referral to Optometry department.  Danielle REINOSO [phone]

## 2019-12-17 RX ORDER — DEXTROAMPHETAMINE SACCHARATE, AMPHETAMINE ASPARTATE MONOHYDRATE, DEXTROAMPHETAMINE SULFATE AND AMPHETAMINE SULFATE 7.5; 7.5; 7.5; 7.5 MG/1; MG/1; MG/1; MG/1
30 CAPSULE, EXTENDED RELEASE ORAL EVERY MORNING
Qty: 30 CAPSULE | Refills: 0 | Status: SHIPPED | OUTPATIENT
Start: 2019-12-17 | End: 2020-01-22 | Stop reason: SDUPTHER

## 2020-01-13 ENCOUNTER — OFFICE VISIT (OUTPATIENT)
Dept: URGENT CARE | Facility: CLINIC | Age: 30
End: 2020-01-13
Payer: COMMERCIAL

## 2020-01-13 VITALS
OXYGEN SATURATION: 99 % | TEMPERATURE: 98 F | BODY MASS INDEX: 40.4 KG/M2 | SYSTOLIC BLOOD PRESSURE: 156 MMHG | RESPIRATION RATE: 20 BRPM | DIASTOLIC BLOOD PRESSURE: 91 MMHG | HEIGHT: 65 IN | HEART RATE: 89 BPM | WEIGHT: 242.5 LBS

## 2020-01-13 DIAGNOSIS — R30.0 DYSURIA: Primary | ICD-10-CM

## 2020-01-13 DIAGNOSIS — N30.01 ACUTE CYSTITIS WITH HEMATURIA: ICD-10-CM

## 2020-01-13 LAB
BILIRUB UR QL STRIP: POSITIVE
GLUCOSE UR QL STRIP: POSITIVE
KETONES UR QL STRIP: NEGATIVE
LEUKOCYTE ESTERASE UR QL STRIP: NEGATIVE
PH, POC UA: 5 (ref 5–8)
POC BLOOD, URINE: NEGATIVE
POC NITRATES, URINE: POSITIVE
PROT UR QL STRIP: POSITIVE
SP GR UR STRIP: 1.02 (ref 1–1.03)
UROBILINOGEN UR STRIP-ACNC: ABNORMAL (ref 0.1–1.1)

## 2020-01-13 PROCEDURE — 87086 URINE CULTURE/COLONY COUNT: CPT

## 2020-01-13 PROCEDURE — 81003 URINALYSIS AUTO W/O SCOPE: CPT | Mod: QW,S$GLB,, | Performed by: NURSE PRACTITIONER

## 2020-01-13 PROCEDURE — 99213 OFFICE O/P EST LOW 20 MIN: CPT | Mod: 25,S$GLB,, | Performed by: NURSE PRACTITIONER

## 2020-01-13 PROCEDURE — 99213 PR OFFICE/OUTPT VISIT, EST, LEVL III, 20-29 MIN: ICD-10-PCS | Mod: 25,S$GLB,, | Performed by: NURSE PRACTITIONER

## 2020-01-13 PROCEDURE — 81003 POCT URINALYSIS, DIPSTICK, AUTOMATED, W/O SCOPE: ICD-10-PCS | Mod: QW,S$GLB,, | Performed by: NURSE PRACTITIONER

## 2020-01-13 RX ORDER — SULFAMETHOXAZOLE AND TRIMETHOPRIM 800; 160 MG/1; MG/1
1 TABLET ORAL 2 TIMES DAILY
Qty: 6 TABLET | Refills: 0 | Status: SHIPPED | OUTPATIENT
Start: 2020-01-13 | End: 2020-01-16

## 2020-01-13 NOTE — PROGRESS NOTES
"Subjective:       Patient ID: Bozena Queen is a 29 y.o. female.    Vitals:  height is 5' 5" (1.651 m) and weight is 110 kg (242 lb 8.1 oz). Her temperature is 98.3 °F (36.8 °C). Her blood pressure is 156/91 (abnormal) and her pulse is 89. Her respiration is 20 and oxygen saturation is 99%.     Chief Complaint: Urinary Tract Infection    Urinary Tract Infection    This is a new problem. The current episode started yesterday. The problem occurs every urination. The problem has been unchanged. The quality of the pain is described as burning. The pain is at a severity of 3/10. The pain is mild. There has been no fever. She is sexually active. There is no history of pyelonephritis. Associated symptoms include frequency. Pertinent negatives include no chills, hematuria, nausea, urgency, vomiting or rash. She has tried nothing for the symptoms.       Constitution: Negative for chills and fever.   Neck: Negative for painful lymph nodes.   Gastrointestinal: Negative for abdominal pain, nausea and vomiting.   Genitourinary: Positive for dysuria and frequency. Negative for urgency, urine decreased, hematuria, history of kidney stones, painful menstruation, irregular menstruation, missed menses, heavy menstrual bleeding, ovarian cysts, genital trauma, vaginal pain, vaginal discharge, vaginal bleeding, vaginal odor, painful intercourse, genital sore, painful ejaculation and pelvic pain.   Musculoskeletal: Negative for back pain.   Skin: Negative for rash and lesion.   Hematologic/Lymphatic: Negative for swollen lymph nodes.       Objective:      Physical Exam      Assessment:       1. Dysuria    2. Acute cystitis with hematuria        Plan:         Dysuria  -     POCT Urinalysis, Dipstick, Automated, W/O Scope    Acute cystitis with hematuria          Patient Instructions     Hematuria: Possible Causes     Many things can lead to blood in the urine (hematuria). The blood may be seen with the eye (macroscopic or gross hematuria). " Or it may only be seen when the urine is looked at under a microscope (microscopic hematuria). Some of the most common causes of blood in the urine are listed below. Often, no cause for the blood can be found. This is called idiopathic hematuria.  · Kidney or bladder stones are collections of crystals. They form in the urine. Stones may be found anywhere in the urinary tract. But they form most often in the kidneys or bladder. In addition to blood in the urine, they can cause severe pain.  · BPH stands for benign prostatic hyperplasia. It is enlargement of the prostate gland. It happens as men age. BPH often causes problems with urination. It sometimes causes blood in the urine.  · A urinary tract infection (UTI) is due to bacteria growing in the urinary tract. It can cause blood in the urine. Other symptoms include burning or pain with urination. You may need to urinate often or urgently. You may also have a fever.  · Damage to the urinary tract may cause blood in the urine. This damage may be due to a blow or accident. It may also result from the use of a urinary catheter. Very hard exercise may sometimes irritate the urinary tract and cause bleeding.  · Cancer may occur anywhere in the urinary tract. A tumor may sometimes cause no symptoms other than bleeding.     Other possible causes of bleeding include:  · Prostatitis (infection of the prostate gland)  · Taking anticoagulants  · Blockage in the urinary tract  · Disease or inflammation of the kidney  · Cystic diseases of the kidneys  · Sickle cell anemia  · Vigorous exercise  · Endometriosis  Date Last Reviewed: 12/1/2016  © 7700-4335 [employer]. [address] [place] [address], [place] [address]. All rights reserved. This information is not intended as a substitute for professional medical care. Always follow your healthcare professional's instructions.

## 2020-01-13 NOTE — PATIENT INSTRUCTIONS
Hematuria: Possible Causes     Many things can lead to blood in the urine (hematuria). The blood may be seen with the eye (macroscopic or gross hematuria). Or it may only be seen when the urine is looked at under a microscope (microscopic hematuria). Some of the most common causes of blood in the urine are listed below. Often, no cause for the blood can be found. This is called idiopathic hematuria.  · Kidney or bladder stones are collections of crystals. They form in the urine. Stones may be found anywhere in the urinary tract. But they form most often in the kidneys or bladder. In addition to blood in the urine, they can cause severe pain.  · BPH stands for benign prostatic hyperplasia. It is enlargement of the prostate gland. It happens as men age. BPH often causes problems with urination. It sometimes causes blood in the urine.  · A urinary tract infection (UTI) is due to bacteria growing in the urinary tract. It can cause blood in the urine. Other symptoms include burning or pain with urination. You may need to urinate often or urgently. You may also have a fever.  · Damage to the urinary tract may cause blood in the urine. This damage may be due to a blow or accident. It may also result from the use of a urinary catheter. Very hard exercise may sometimes irritate the urinary tract and cause bleeding.  · Cancer may occur anywhere in the urinary tract. A tumor may sometimes cause no symptoms other than bleeding.     Other possible causes of bleeding include:  · Prostatitis (infection of the prostate gland)  · Taking anticoagulants  · Blockage in the urinary tract  · Disease or inflammation of the kidney  · Cystic diseases of the kidneys  · Sickle cell anemia  · Vigorous exercise  · Endometriosis  Date Last Reviewed: 12/1/2016 © 2000-2017 [employer]. [address] [place] [id number]. All rights reserved. This information is not intended as a substitute for professional medical care.  Always follow your healthcare professional's instructions.

## 2020-01-15 ENCOUNTER — TELEPHONE (OUTPATIENT)
Dept: URGENT CARE | Facility: CLINIC | Age: 30
End: 2020-01-15

## 2020-01-15 LAB — BACTERIA UR CULT: NORMAL

## 2020-01-16 ENCOUNTER — TELEPHONE (OUTPATIENT)
Dept: URGENT CARE | Facility: CLINIC | Age: 30
End: 2020-01-16

## 2020-01-16 NOTE — TELEPHONE ENCOUNTER
called with negative culture results.  Patient states that the medication did make her nauseated.  She did complete the medication today.  She has a follow-up with her PCP.

## 2020-01-22 RX ORDER — DEXTROAMPHETAMINE SACCHARATE, AMPHETAMINE ASPARTATE MONOHYDRATE, DEXTROAMPHETAMINE SULFATE AND AMPHETAMINE SULFATE 7.5; 7.5; 7.5; 7.5 MG/1; MG/1; MG/1; MG/1
30 CAPSULE, EXTENDED RELEASE ORAL EVERY MORNING
Qty: 30 CAPSULE | Refills: 0 | Status: SHIPPED | OUTPATIENT
Start: 2020-01-22 | End: 2020-03-11 | Stop reason: SDUPTHER

## 2020-02-17 ENCOUNTER — PATIENT OUTREACH (OUTPATIENT)
Dept: ADMINISTRATIVE | Facility: HOSPITAL | Age: 30
End: 2020-02-17

## 2020-02-17 NOTE — PROGRESS NOTES
Pre-visit chart review completed.  Immunizations reviewed and updated.    Patient due for the following    HIV Screening     Pap Smear with HPV Cotest

## 2020-03-11 ENCOUNTER — OFFICE VISIT (OUTPATIENT)
Dept: PRIMARY CARE CLINIC | Facility: CLINIC | Age: 30
End: 2020-03-11
Payer: COMMERCIAL

## 2020-03-11 VITALS
BODY MASS INDEX: 40.14 KG/M2 | WEIGHT: 240.94 LBS | TEMPERATURE: 98 F | HEART RATE: 56 BPM | SYSTOLIC BLOOD PRESSURE: 110 MMHG | DIASTOLIC BLOOD PRESSURE: 76 MMHG | HEIGHT: 65 IN

## 2020-03-11 DIAGNOSIS — F90.0 ATTENTION DEFICIT HYPERACTIVITY DISORDER (ADHD), PREDOMINANTLY INATTENTIVE TYPE: Primary | ICD-10-CM

## 2020-03-11 PROCEDURE — 99213 PR OFFICE/OUTPT VISIT, EST, LEVL III, 20-29 MIN: ICD-10-PCS | Mod: S$GLB,,, | Performed by: FAMILY MEDICINE

## 2020-03-11 PROCEDURE — 3008F BODY MASS INDEX DOCD: CPT | Mod: CPTII,S$GLB,, | Performed by: FAMILY MEDICINE

## 2020-03-11 PROCEDURE — 99999 PR PBB SHADOW E&M-EST. PATIENT-LVL III: ICD-10-PCS | Mod: PBBFAC,,, | Performed by: FAMILY MEDICINE

## 2020-03-11 PROCEDURE — 99213 OFFICE O/P EST LOW 20 MIN: CPT | Mod: S$GLB,,, | Performed by: FAMILY MEDICINE

## 2020-03-11 PROCEDURE — 3008F PR BODY MASS INDEX (BMI) DOCUMENTED: ICD-10-PCS | Mod: CPTII,S$GLB,, | Performed by: FAMILY MEDICINE

## 2020-03-11 PROCEDURE — 99999 PR PBB SHADOW E&M-EST. PATIENT-LVL III: CPT | Mod: PBBFAC,,, | Performed by: FAMILY MEDICINE

## 2020-03-11 RX ORDER — DEXTROAMPHETAMINE SACCHARATE, AMPHETAMINE ASPARTATE MONOHYDRATE, DEXTROAMPHETAMINE SULFATE AND AMPHETAMINE SULFATE 7.5; 7.5; 7.5; 7.5 MG/1; MG/1; MG/1; MG/1
30 CAPSULE, EXTENDED RELEASE ORAL EVERY MORNING
Qty: 30 CAPSULE | Refills: 0 | Status: SHIPPED | OUTPATIENT
Start: 2020-03-11 | End: 2020-04-20 | Stop reason: SDUPTHER

## 2020-03-19 NOTE — PROGRESS NOTES
Subjective:   Patient ID: Bozena Queen is a 29 y.o. female.    Chief Complaint: Medication Management (adhd)      HPI  29-year-old here for ADHD med follow-up.  Doing well.  Patient queried and denies any further complaints.    LOCATION  DURATION  SEVERITY  QUALITY  TIMING  CAUSE  ASSOCIATED SYMPTOMS  MODIFIERS.    ALLERGIES AND MEDICATIONS: updated and reviewed.  Review of patient's allergies indicates:  No Known Allergies    Current Outpatient Medications:     dextroamphetamine-amphetamine (ADDERALL XR) 30 MG 24 hr capsule, Take 1 capsule (30 mg total) by mouth every morning., Disp: 30 capsule, Rfl: 0    Review of Systems   Constitutional: Negative for activity change, appetite change, chills, diaphoresis, fatigue, fever and unexpected weight change.   HENT: Negative for congestion, ear discharge, ear pain, facial swelling, hearing loss, nosebleeds, postnasal drip, rhinorrhea, sinus pressure, sneezing, sore throat, tinnitus, trouble swallowing and voice change.    Eyes: Negative for photophobia, pain, discharge, redness, itching and visual disturbance.   Respiratory: Negative for cough, chest tightness, shortness of breath and wheezing.    Cardiovascular: Negative for chest pain, palpitations and leg swelling.   Gastrointestinal: Negative for abdominal distention, abdominal pain, anal bleeding, blood in stool, constipation, diarrhea, nausea, rectal pain and vomiting.   Endocrine: Negative for cold intolerance, heat intolerance, polydipsia, polyphagia and polyuria.   Genitourinary: Negative for difficulty urinating, dysuria and flank pain.   Musculoskeletal: Negative for arthralgias, back pain, joint swelling, myalgias and neck pain.   Skin: Negative for rash.   Neurological: Negative for dizziness, tremors, seizures, syncope, speech difficulty, weakness, light-headedness, numbness and headaches.   Psychiatric/Behavioral: Negative for behavioral problems, confusion, decreased concentration, dysphoric mood, sleep  "disturbance and suicidal ideas. The patient is not nervous/anxious and is not hyperactive.        Objective:     Vitals:    03/11/20 1431   BP: 110/76   Pulse: (!) 56   Temp: 97.9 °F (36.6 °C)   Weight: 109.3 kg (240 lb 15.4 oz)   Height: 5' 5" (1.651 m)     Body mass index is 40.1 kg/m².    Physical Exam   Constitutional: She is oriented to person, place, and time. She appears well-developed and well-nourished.   HENT:   Head: Normocephalic and atraumatic.   Neurological: She is alert and oriented to person, place, and time.   Skin: Skin is warm and dry.   Psychiatric: She has a normal mood and affect. Her behavior is normal.   Nursing note and vitals reviewed.      Assessment and Plan:   Bozena was seen today for medication management.    Diagnoses and all orders for this visit:    Attention deficit hyperactivity disorder (ADHD), predominantly inattentive type    Other orders  -     dextroamphetamine-amphetamine (ADDERALL XR) 30 MG 24 hr capsule; Take 1 capsule (30 mg total) by mouth every morning.        Follow up in about 3 months (around 6/11/2020).    THIS NOTE WILL BE SHARED WITH THE PATIENT.        "

## 2020-04-21 RX ORDER — DEXTROAMPHETAMINE SACCHARATE, AMPHETAMINE ASPARTATE MONOHYDRATE, DEXTROAMPHETAMINE SULFATE AND AMPHETAMINE SULFATE 7.5; 7.5; 7.5; 7.5 MG/1; MG/1; MG/1; MG/1
30 CAPSULE, EXTENDED RELEASE ORAL EVERY MORNING
Qty: 30 CAPSULE | Refills: 0 | OUTPATIENT
Start: 2020-04-21

## 2020-04-21 RX ORDER — DEXTROAMPHETAMINE SACCHARATE, AMPHETAMINE ASPARTATE MONOHYDRATE, DEXTROAMPHETAMINE SULFATE AND AMPHETAMINE SULFATE 7.5; 7.5; 7.5; 7.5 MG/1; MG/1; MG/1; MG/1
30 CAPSULE, EXTENDED RELEASE ORAL EVERY MORNING
Qty: 30 CAPSULE | Refills: 0 | Status: SHIPPED | OUTPATIENT
Start: 2020-04-21 | End: 2020-10-05 | Stop reason: SDUPTHER

## 2020-10-05 ENCOUNTER — OFFICE VISIT (OUTPATIENT)
Dept: PRIMARY CARE CLINIC | Facility: CLINIC | Age: 30
End: 2020-10-05
Payer: MEDICAID

## 2020-10-05 VITALS
SYSTOLIC BLOOD PRESSURE: 118 MMHG | DIASTOLIC BLOOD PRESSURE: 78 MMHG | BODY MASS INDEX: 38.75 KG/M2 | OXYGEN SATURATION: 100 % | HEART RATE: 59 BPM | TEMPERATURE: 98 F | WEIGHT: 232.56 LBS | HEIGHT: 65 IN

## 2020-10-05 DIAGNOSIS — Z00.00 ROUTINE MEDICAL EXAM: Primary | ICD-10-CM

## 2020-10-05 DIAGNOSIS — Z12.83 SCREENING FOR SKIN CANCER: ICD-10-CM

## 2020-10-05 DIAGNOSIS — Z23 NEED FOR INFLUENZA VACCINATION: ICD-10-CM

## 2020-10-05 DIAGNOSIS — F90.0 ATTENTION DEFICIT HYPERACTIVITY DISORDER (ADHD), PREDOMINANTLY INATTENTIVE TYPE: ICD-10-CM

## 2020-10-05 PROCEDURE — 99395 PR PREVENTIVE VISIT,EST,18-39: ICD-10-PCS | Mod: S$PBB,,, | Performed by: FAMILY MEDICINE

## 2020-10-05 PROCEDURE — 99999 PR PBB SHADOW E&M-EST. PATIENT-LVL III: ICD-10-PCS | Mod: PBBFAC,,, | Performed by: FAMILY MEDICINE

## 2020-10-05 PROCEDURE — 99213 OFFICE O/P EST LOW 20 MIN: CPT | Mod: PBBFAC,PN | Performed by: FAMILY MEDICINE

## 2020-10-05 PROCEDURE — 99999 PR PBB SHADOW E&M-EST. PATIENT-LVL III: CPT | Mod: PBBFAC,,, | Performed by: FAMILY MEDICINE

## 2020-10-05 PROCEDURE — 99395 PREV VISIT EST AGE 18-39: CPT | Mod: S$PBB,,, | Performed by: FAMILY MEDICINE

## 2020-10-05 PROCEDURE — 90471 IMMUNIZATION ADMIN: CPT | Mod: PBBFAC,PN

## 2020-10-05 RX ORDER — DEXTROAMPHETAMINE SACCHARATE, AMPHETAMINE ASPARTATE MONOHYDRATE, DEXTROAMPHETAMINE SULFATE AND AMPHETAMINE SULFATE 7.5; 7.5; 7.5; 7.5 MG/1; MG/1; MG/1; MG/1
30 CAPSULE, EXTENDED RELEASE ORAL EVERY MORNING
Qty: 30 CAPSULE | Refills: 0 | Status: SHIPPED | OUTPATIENT
Start: 2020-10-05 | End: 2021-05-14 | Stop reason: SDUPTHER

## 2020-10-05 NOTE — PROGRESS NOTES
Subjective:   Patient ID: Bozena Queen is a 30 y.o. female.    Chief Complaint: Medication Refill      HPI  Very nice 30-year-old female who is here for annual exam.  Overall doing very well.  On ADD medication  Patient queried and denies any further complaints.    Health maintenance reviewed    ALLERGIES AND MEDICATIONS: updated and reviewed.  Review of patient's allergies indicates:  No Known Allergies    Current Outpatient Medications:     dextroamphetamine-amphetamine (ADDERALL XR) 30 MG 24 hr capsule, Take 1 capsule (30 mg total) by mouth every morning., Disp: 30 capsule, Rfl: 0    Review of Systems   Constitutional: Negative for activity change, appetite change, chills, diaphoresis, fatigue, fever and unexpected weight change.   HENT: Negative for congestion, ear discharge, ear pain, facial swelling, hearing loss, nosebleeds, postnasal drip, rhinorrhea, sinus pressure, sneezing, sore throat, tinnitus, trouble swallowing and voice change.    Eyes: Negative for photophobia, pain, discharge, redness, itching and visual disturbance.   Respiratory: Negative for cough, chest tightness, shortness of breath and wheezing.    Cardiovascular: Negative for chest pain, palpitations and leg swelling.   Gastrointestinal: Negative for abdominal distention, abdominal pain, anal bleeding, blood in stool, constipation, diarrhea, nausea, rectal pain and vomiting.   Endocrine: Negative for cold intolerance, heat intolerance, polydipsia, polyphagia and polyuria.   Genitourinary: Negative for difficulty urinating, dysuria and flank pain.   Musculoskeletal: Negative for arthralgias, back pain, joint swelling, myalgias and neck pain.   Skin: Negative for rash.   Neurological: Negative for dizziness, tremors, seizures, syncope, speech difficulty, weakness, light-headedness, numbness and headaches.   Psychiatric/Behavioral: Negative for behavioral problems, confusion, decreased concentration, dysphoric mood, sleep disturbance and  "suicidal ideas. The patient is not nervous/anxious and is not hyperactive.        Objective:     Vitals:    10/05/20 1303   BP: 118/78   Pulse: (!) 59   Temp: 98.1 °F (36.7 °C)   TempSrc: Oral   SpO2: 100%   Weight: 105.5 kg (232 lb 9.4 oz)   Height: 5' 5" (1.651 m)   PainSc: 0-No pain     Body mass index is 38.7 kg/m².    Physical Exam  Vitals signs and nursing note reviewed.   Constitutional:       General: She is not in acute distress.     Appearance: She is well-developed. She is not diaphoretic.   HENT:      Head: Normocephalic and atraumatic.      Right Ear: Hearing, tympanic membrane, ear canal and external ear normal. No tenderness.      Left Ear: Hearing, tympanic membrane, ear canal and external ear normal. No tenderness.      Nose: Nose normal.   Eyes:      General: Lids are normal. No scleral icterus.        Right eye: No discharge.         Left eye: No discharge.      Extraocular Movements:      Right eye: Normal extraocular motion.      Left eye: Normal extraocular motion.      Conjunctiva/sclera: Conjunctivae normal.      Right eye: Right conjunctiva is not injected.      Left eye: Left conjunctiva is not injected.      Pupils: Pupils are equal, round, and reactive to light.   Neck:      Musculoskeletal: Normal range of motion and neck supple. No edema.      Thyroid: No thyromegaly.      Vascular: No carotid bruit or JVD.      Trachea: No tracheal deviation.   Cardiovascular:      Rate and Rhythm: Normal rate and regular rhythm.      Pulses: Normal pulses.      Heart sounds: Normal heart sounds. No murmur. No friction rub.   Pulmonary:      Effort: Pulmonary effort is normal. No accessory muscle usage or respiratory distress.      Breath sounds: Normal breath sounds. No wheezing, rhonchi or rales.   Abdominal:      General: Bowel sounds are normal. There is no distension or abdominal bruit.      Palpations: Abdomen is soft. There is no mass or pulsatile mass.      Tenderness: There is no abdominal " tenderness. There is no guarding or rebound. Negative signs include Wade's sign and McBurney's sign.   Lymphadenopathy:      Head:      Right side of head: No submandibular, preauricular or posterior auricular adenopathy.      Left side of head: No submandibular, preauricular or posterior auricular adenopathy.      Cervical: No cervical adenopathy.   Skin:     General: Skin is warm and dry.      Findings: No ecchymosis, erythema or rash. Rash is not urticarial.      Nails: There is no clubbing.     Neurological:      Mental Status: She is alert and oriented to person, place, and time.      GCS: GCS eye subscore is 4. GCS verbal subscore is 5. GCS motor subscore is 6.   Psychiatric:         Mood and Affect: Mood is not anxious or depressed. Affect is not angry or inappropriate.         Speech: Speech normal.         Behavior: Behavior normal. Behavior is cooperative.         Thought Content: Thought content normal.         Assessment and Plan:   Bozena was seen today for medication refill.    Diagnoses and all orders for this visit:    Routine medical exam  -     CBC auto differential; Future  -     Comprehensive Metabolic Panel; Future  -     Hemoglobin A1C; Future  -     Lipid Panel; Future  -     TSH; Future  -     T4, Free; Future  -     Hepatitis C Antibody; Future    Screening for skin cancer  -     Ambulatory referral/consult to Dermatology; Future    Need for influenza vaccination  -     Influenza - Quadrivalent *Preferred* (6 months+) (PF)    BMI 39.0-39.9,adult    Attention deficit hyperactivity disorder (ADHD), predominantly inattentive type    Other orders  -     dextroamphetamine-amphetamine (ADDERALL XR) 30 MG 24 hr capsule; Take 1 capsule (30 mg total) by mouth every morning.     Continue to work on weight loss by calorie restriction and exercise especially following high-fiber/low-fat diet    Follow-up 3 months regarding ADD medication        No follow-ups on file.    THIS NOTE WILL BE SHARED WITH THE  PATIENT.

## 2020-10-09 ENCOUNTER — PATIENT MESSAGE (OUTPATIENT)
Dept: ADMINISTRATIVE | Facility: HOSPITAL | Age: 30
End: 2020-10-09

## 2020-10-09 ENCOUNTER — LAB VISIT (OUTPATIENT)
Dept: LAB | Facility: HOSPITAL | Age: 30
End: 2020-10-09
Attending: FAMILY MEDICINE
Payer: MEDICAID

## 2020-10-09 DIAGNOSIS — Z00.00 ROUTINE MEDICAL EXAM: ICD-10-CM

## 2020-10-09 LAB
ALBUMIN SERPL BCP-MCNC: 3.8 G/DL (ref 3.5–5.2)
ALP SERPL-CCNC: 60 U/L (ref 55–135)
ALT SERPL W/O P-5'-P-CCNC: 10 U/L (ref 10–44)
ANION GAP SERPL CALC-SCNC: 9 MMOL/L (ref 8–16)
AST SERPL-CCNC: 14 U/L (ref 10–40)
BASOPHILS # BLD AUTO: 0.04 K/UL (ref 0–0.2)
BASOPHILS NFR BLD: 0.6 % (ref 0–1.9)
BILIRUB SERPL-MCNC: 0.5 MG/DL (ref 0.1–1)
BUN SERPL-MCNC: 9 MG/DL (ref 6–20)
CALCIUM SERPL-MCNC: 9 MG/DL (ref 8.7–10.5)
CHLORIDE SERPL-SCNC: 106 MMOL/L (ref 95–110)
CHOLEST SERPL-MCNC: 183 MG/DL (ref 120–199)
CHOLEST/HDLC SERPL: 4.1 {RATIO} (ref 2–5)
CO2 SERPL-SCNC: 25 MMOL/L (ref 23–29)
CREAT SERPL-MCNC: 0.8 MG/DL (ref 0.5–1.4)
DIFFERENTIAL METHOD: ABNORMAL
EOSINOPHIL # BLD AUTO: 0.1 K/UL (ref 0–0.5)
EOSINOPHIL NFR BLD: 1.5 % (ref 0–8)
ERYTHROCYTE [DISTWIDTH] IN BLOOD BY AUTOMATED COUNT: 13.4 % (ref 11.5–14.5)
EST. GFR  (AFRICAN AMERICAN): >60 ML/MIN/1.73 M^2
EST. GFR  (NON AFRICAN AMERICAN): >60 ML/MIN/1.73 M^2
ESTIMATED AVG GLUCOSE: 100 MG/DL (ref 68–131)
GLUCOSE SERPL-MCNC: 92 MG/DL (ref 70–110)
HBA1C MFR BLD HPLC: 5.1 % (ref 4–5.6)
HCT VFR BLD AUTO: 39.1 % (ref 37–48.5)
HDLC SERPL-MCNC: 45 MG/DL (ref 40–75)
HDLC SERPL: 24.6 % (ref 20–50)
HGB BLD-MCNC: 12.4 G/DL (ref 12–16)
IMM GRANULOCYTES # BLD AUTO: 0.01 K/UL (ref 0–0.04)
IMM GRANULOCYTES NFR BLD AUTO: 0.2 % (ref 0–0.5)
LDLC SERPL CALC-MCNC: 117.6 MG/DL (ref 63–159)
LYMPHOCYTES # BLD AUTO: 2.2 K/UL (ref 1–4.8)
LYMPHOCYTES NFR BLD: 33.5 % (ref 18–48)
MCH RBC QN AUTO: 28.4 PG (ref 27–31)
MCHC RBC AUTO-ENTMCNC: 31.7 G/DL (ref 32–36)
MCV RBC AUTO: 90 FL (ref 82–98)
MONOCYTES # BLD AUTO: 0.7 K/UL (ref 0.3–1)
MONOCYTES NFR BLD: 9.8 % (ref 4–15)
NEUTROPHILS # BLD AUTO: 3.6 K/UL (ref 1.8–7.7)
NEUTROPHILS NFR BLD: 54.4 % (ref 38–73)
NONHDLC SERPL-MCNC: 138 MG/DL
NRBC BLD-RTO: 0 /100 WBC
PLATELET # BLD AUTO: 380 K/UL (ref 150–350)
PMV BLD AUTO: 10.8 FL (ref 9.2–12.9)
POTASSIUM SERPL-SCNC: 3.9 MMOL/L (ref 3.5–5.1)
PROT SERPL-MCNC: 6.9 G/DL (ref 6–8.4)
RBC # BLD AUTO: 4.36 M/UL (ref 4–5.4)
SODIUM SERPL-SCNC: 140 MMOL/L (ref 136–145)
T4 FREE SERPL-MCNC: 1.02 NG/DL (ref 0.71–1.51)
TRIGL SERPL-MCNC: 102 MG/DL (ref 30–150)
TSH SERPL DL<=0.005 MIU/L-ACNC: 1.74 UIU/ML (ref 0.4–4)
WBC # BLD AUTO: 6.63 K/UL (ref 3.9–12.7)

## 2020-10-09 PROCEDURE — 80061 LIPID PANEL: CPT

## 2020-10-09 PROCEDURE — 36415 COLL VENOUS BLD VENIPUNCTURE: CPT | Mod: PN

## 2020-10-09 PROCEDURE — 86803 HEPATITIS C AB TEST: CPT

## 2020-10-09 PROCEDURE — 84439 ASSAY OF FREE THYROXINE: CPT

## 2020-10-09 PROCEDURE — 84443 ASSAY THYROID STIM HORMONE: CPT

## 2020-10-09 PROCEDURE — 83036 HEMOGLOBIN GLYCOSYLATED A1C: CPT

## 2020-10-09 PROCEDURE — 80053 COMPREHEN METABOLIC PANEL: CPT

## 2020-10-09 PROCEDURE — 85025 COMPLETE CBC W/AUTO DIFF WBC: CPT

## 2020-10-10 LAB — HCV AB SERPL QL IA: NEGATIVE

## 2021-01-04 ENCOUNTER — PATIENT MESSAGE (OUTPATIENT)
Dept: ADMINISTRATIVE | Facility: HOSPITAL | Age: 31
End: 2021-01-04

## 2021-03-13 ENCOUNTER — IMMUNIZATION (OUTPATIENT)
Dept: PRIMARY CARE CLINIC | Facility: CLINIC | Age: 31
End: 2021-03-13
Payer: MEDICAID

## 2021-03-13 DIAGNOSIS — Z23 NEED FOR VACCINATION: Primary | ICD-10-CM

## 2021-03-13 PROCEDURE — 91300 PR SARS-COV- 2 COVID-19 VACCINE, NO PRSV, 30MCG/0.3ML, IM: CPT | Mod: S$GLB,,, | Performed by: INTERNAL MEDICINE

## 2021-03-13 PROCEDURE — 91300 PR SARS-COV- 2 COVID-19 VACCINE, NO PRSV, 30MCG/0.3ML, IM: ICD-10-PCS | Mod: S$GLB,,, | Performed by: INTERNAL MEDICINE

## 2021-03-13 PROCEDURE — 0001A PR IMMUNIZ ADMIN, SARS-COV-2 COVID-19 VACC, 30MCG/0.3ML, 1ST DOSE: ICD-10-PCS | Mod: CV19,S$GLB,, | Performed by: INTERNAL MEDICINE

## 2021-03-13 PROCEDURE — 0001A PR IMMUNIZ ADMIN, SARS-COV-2 COVID-19 VACC, 30MCG/0.3ML, 1ST DOSE: CPT | Mod: CV19,S$GLB,, | Performed by: INTERNAL MEDICINE

## 2021-03-13 RX ADMIN — Medication 0.3 ML: at 10:03

## 2021-04-03 ENCOUNTER — IMMUNIZATION (OUTPATIENT)
Dept: PRIMARY CARE CLINIC | Facility: CLINIC | Age: 31
End: 2021-04-03
Payer: MEDICAID

## 2021-04-03 DIAGNOSIS — Z23 NEED FOR VACCINATION: Primary | ICD-10-CM

## 2021-04-03 PROCEDURE — 91300 PR SARS-COV- 2 COVID-19 VACCINE, NO PRSV, 30MCG/0.3ML, IM: ICD-10-PCS | Mod: S$GLB,,, | Performed by: INTERNAL MEDICINE

## 2021-04-03 PROCEDURE — 0002A PR IMMUNIZ ADMIN, SARS-COV-2 COVID-19 VACC, 30MCG/0.3ML, 2ND DOSE: CPT | Mod: CV19,S$GLB,, | Performed by: INTERNAL MEDICINE

## 2021-04-03 PROCEDURE — 91300 PR SARS-COV- 2 COVID-19 VACCINE, NO PRSV, 30MCG/0.3ML, IM: CPT | Mod: S$GLB,,, | Performed by: INTERNAL MEDICINE

## 2021-04-03 PROCEDURE — 0002A PR IMMUNIZ ADMIN, SARS-COV-2 COVID-19 VACC, 30MCG/0.3ML, 2ND DOSE: ICD-10-PCS | Mod: CV19,S$GLB,, | Performed by: INTERNAL MEDICINE

## 2021-04-03 RX ADMIN — Medication 0.3 ML: at 10:04

## 2021-04-05 ENCOUNTER — PATIENT MESSAGE (OUTPATIENT)
Dept: ADMINISTRATIVE | Facility: HOSPITAL | Age: 31
End: 2021-04-05

## 2021-05-14 ENCOUNTER — LAB VISIT (OUTPATIENT)
Dept: LAB | Facility: HOSPITAL | Age: 31
End: 2021-05-14
Attending: FAMILY MEDICINE
Payer: COMMERCIAL

## 2021-05-14 ENCOUNTER — OFFICE VISIT (OUTPATIENT)
Dept: PRIMARY CARE CLINIC | Facility: CLINIC | Age: 31
End: 2021-05-14
Payer: COMMERCIAL

## 2021-05-14 VITALS
DIASTOLIC BLOOD PRESSURE: 80 MMHG | TEMPERATURE: 99 F | WEIGHT: 224.88 LBS | BODY MASS INDEX: 38.39 KG/M2 | SYSTOLIC BLOOD PRESSURE: 118 MMHG | HEIGHT: 64 IN

## 2021-05-14 DIAGNOSIS — R10.11 RUQ PAIN: Primary | ICD-10-CM

## 2021-05-14 DIAGNOSIS — F90.0 ATTENTION DEFICIT HYPERACTIVITY DISORDER (ADHD), PREDOMINANTLY INATTENTIVE TYPE: ICD-10-CM

## 2021-05-14 DIAGNOSIS — R10.11 RUQ PAIN: ICD-10-CM

## 2021-05-14 LAB
BASOPHILS # BLD AUTO: 0.05 K/UL (ref 0–0.2)
BASOPHILS NFR BLD: 0.5 % (ref 0–1.9)
DIFFERENTIAL METHOD: NORMAL
EOSINOPHIL # BLD AUTO: 0 K/UL (ref 0–0.5)
EOSINOPHIL NFR BLD: 0.4 % (ref 0–8)
ERYTHROCYTE [DISTWIDTH] IN BLOOD BY AUTOMATED COUNT: 13 % (ref 11.5–14.5)
HCT VFR BLD AUTO: 42.8 % (ref 37–48.5)
HGB BLD-MCNC: 13.9 G/DL (ref 12–16)
IMM GRANULOCYTES # BLD AUTO: 0.03 K/UL (ref 0–0.04)
IMM GRANULOCYTES NFR BLD AUTO: 0.3 % (ref 0–0.5)
LYMPHOCYTES # BLD AUTO: 2.5 K/UL (ref 1–4.8)
LYMPHOCYTES NFR BLD: 25 % (ref 18–48)
MCH RBC QN AUTO: 29.9 PG (ref 27–31)
MCHC RBC AUTO-ENTMCNC: 32.5 G/DL (ref 32–36)
MCV RBC AUTO: 92 FL (ref 82–98)
MONOCYTES # BLD AUTO: 0.9 K/UL (ref 0.3–1)
MONOCYTES NFR BLD: 8.8 % (ref 4–15)
NEUTROPHILS # BLD AUTO: 6.4 K/UL (ref 1.8–7.7)
NEUTROPHILS NFR BLD: 65 % (ref 38–73)
NRBC BLD-RTO: 0 /100 WBC
PLATELET # BLD AUTO: 427 K/UL (ref 150–450)
PMV BLD AUTO: 9.8 FL (ref 9.2–12.9)
RBC # BLD AUTO: 4.65 M/UL (ref 4–5.4)
WBC # BLD AUTO: 9.81 K/UL (ref 3.9–12.7)

## 2021-05-14 PROCEDURE — 36415 COLL VENOUS BLD VENIPUNCTURE: CPT | Mod: PN | Performed by: FAMILY MEDICINE

## 2021-05-14 PROCEDURE — 3008F PR BODY MASS INDEX (BMI) DOCUMENTED: ICD-10-PCS | Mod: CPTII,S$GLB,, | Performed by: FAMILY MEDICINE

## 2021-05-14 PROCEDURE — 1126F PR PAIN SEVERITY QUANTIFIED, NO PAIN PRESENT: ICD-10-PCS | Mod: S$GLB,,, | Performed by: FAMILY MEDICINE

## 2021-05-14 PROCEDURE — 3008F BODY MASS INDEX DOCD: CPT | Mod: CPTII,S$GLB,, | Performed by: FAMILY MEDICINE

## 2021-05-14 PROCEDURE — 99999 PR PBB SHADOW E&M-EST. PATIENT-LVL III: ICD-10-PCS | Mod: PBBFAC,,, | Performed by: FAMILY MEDICINE

## 2021-05-14 PROCEDURE — 85025 COMPLETE CBC W/AUTO DIFF WBC: CPT | Performed by: FAMILY MEDICINE

## 2021-05-14 PROCEDURE — 99999 PR PBB SHADOW E&M-EST. PATIENT-LVL III: CPT | Mod: PBBFAC,,, | Performed by: FAMILY MEDICINE

## 2021-05-14 PROCEDURE — 99214 PR OFFICE/OUTPT VISIT, EST, LEVL IV, 30-39 MIN: ICD-10-PCS | Mod: S$GLB,,, | Performed by: FAMILY MEDICINE

## 2021-05-14 PROCEDURE — 80053 COMPREHEN METABOLIC PANEL: CPT | Performed by: FAMILY MEDICINE

## 2021-05-14 PROCEDURE — 83690 ASSAY OF LIPASE: CPT | Performed by: FAMILY MEDICINE

## 2021-05-14 PROCEDURE — 82150 ASSAY OF AMYLASE: CPT | Performed by: FAMILY MEDICINE

## 2021-05-14 PROCEDURE — 1126F AMNT PAIN NOTED NONE PRSNT: CPT | Mod: S$GLB,,, | Performed by: FAMILY MEDICINE

## 2021-05-14 PROCEDURE — 99214 OFFICE O/P EST MOD 30 MIN: CPT | Mod: S$GLB,,, | Performed by: FAMILY MEDICINE

## 2021-05-14 RX ORDER — DEXTROAMPHETAMINE SACCHARATE, AMPHETAMINE ASPARTATE MONOHYDRATE, DEXTROAMPHETAMINE SULFATE AND AMPHETAMINE SULFATE 7.5; 7.5; 7.5; 7.5 MG/1; MG/1; MG/1; MG/1
30 CAPSULE, EXTENDED RELEASE ORAL EVERY MORNING
Qty: 30 CAPSULE | Refills: 0 | Status: SHIPPED | OUTPATIENT
Start: 2021-05-14 | End: 2021-06-16 | Stop reason: SDUPTHER

## 2021-05-15 LAB
ALBUMIN SERPL BCP-MCNC: 4.1 G/DL (ref 3.5–5.2)
ALP SERPL-CCNC: 70 U/L (ref 55–135)
ALT SERPL W/O P-5'-P-CCNC: 10 U/L (ref 10–44)
AMYLASE SERPL-CCNC: 33 U/L (ref 20–110)
ANION GAP SERPL CALC-SCNC: 12 MMOL/L (ref 8–16)
AST SERPL-CCNC: 17 U/L (ref 10–40)
BILIRUB SERPL-MCNC: 0.5 MG/DL (ref 0.1–1)
BUN SERPL-MCNC: 15 MG/DL (ref 6–20)
CALCIUM SERPL-MCNC: 10.3 MG/DL (ref 8.7–10.5)
CHLORIDE SERPL-SCNC: 103 MMOL/L (ref 95–110)
CO2 SERPL-SCNC: 22 MMOL/L (ref 23–29)
CREAT SERPL-MCNC: 0.8 MG/DL (ref 0.5–1.4)
EST. GFR  (AFRICAN AMERICAN): >60 ML/MIN/1.73 M^2
EST. GFR  (NON AFRICAN AMERICAN): >60 ML/MIN/1.73 M^2
GLUCOSE SERPL-MCNC: 81 MG/DL (ref 70–110)
LIPASE SERPL-CCNC: 6 U/L (ref 4–60)
POTASSIUM SERPL-SCNC: 4.2 MMOL/L (ref 3.5–5.1)
PROT SERPL-MCNC: 8.3 G/DL (ref 6–8.4)
SODIUM SERPL-SCNC: 137 MMOL/L (ref 136–145)

## 2021-05-22 ENCOUNTER — HOSPITAL ENCOUNTER (OUTPATIENT)
Dept: RADIOLOGY | Facility: OTHER | Age: 31
Discharge: HOME | End: 2021-05-22
Attending: FAMILY MEDICINE
Payer: COMMERCIAL

## 2021-05-22 DIAGNOSIS — R10.11 RUQ PAIN: ICD-10-CM

## 2021-05-22 PROCEDURE — 76705 US ABDOMEN LIMITED: ICD-10-PCS | Mod: 26,,, | Performed by: RADIOLOGY

## 2021-05-22 PROCEDURE — 76705 ECHO EXAM OF ABDOMEN: CPT | Mod: 26,,, | Performed by: RADIOLOGY

## 2021-05-22 PROCEDURE — 76705 ECHO EXAM OF ABDOMEN: CPT | Mod: TC

## 2021-07-07 ENCOUNTER — PATIENT MESSAGE (OUTPATIENT)
Dept: ADMINISTRATIVE | Facility: HOSPITAL | Age: 31
End: 2021-07-07

## 2021-07-26 RX ORDER — DEXTROAMPHETAMINE SACCHARATE, AMPHETAMINE ASPARTATE MONOHYDRATE, DEXTROAMPHETAMINE SULFATE AND AMPHETAMINE SULFATE 7.5; 7.5; 7.5; 7.5 MG/1; MG/1; MG/1; MG/1
30 CAPSULE, EXTENDED RELEASE ORAL EVERY MORNING
Qty: 30 CAPSULE | Refills: 0 | Status: SHIPPED | OUTPATIENT
Start: 2021-07-26 | End: 2021-09-06 | Stop reason: SDUPTHER

## 2021-09-07 RX ORDER — DEXTROAMPHETAMINE SACCHARATE, AMPHETAMINE ASPARTATE MONOHYDRATE, DEXTROAMPHETAMINE SULFATE AND AMPHETAMINE SULFATE 7.5; 7.5; 7.5; 7.5 MG/1; MG/1; MG/1; MG/1
30 CAPSULE, EXTENDED RELEASE ORAL EVERY MORNING
Qty: 30 CAPSULE | Refills: 0 | Status: SHIPPED | OUTPATIENT
Start: 2021-09-07 | End: 2021-10-11 | Stop reason: SDUPTHER

## 2021-10-05 ENCOUNTER — PATIENT MESSAGE (OUTPATIENT)
Dept: ADMINISTRATIVE | Facility: HOSPITAL | Age: 31
End: 2021-10-05

## 2021-10-11 ENCOUNTER — OFFICE VISIT (OUTPATIENT)
Dept: PRIMARY CARE CLINIC | Facility: CLINIC | Age: 31
End: 2021-10-11
Payer: COMMERCIAL

## 2021-10-11 VITALS
DIASTOLIC BLOOD PRESSURE: 76 MMHG | OXYGEN SATURATION: 100 % | WEIGHT: 229.25 LBS | BODY MASS INDEX: 40.62 KG/M2 | TEMPERATURE: 98 F | HEIGHT: 63 IN | SYSTOLIC BLOOD PRESSURE: 122 MMHG | HEART RATE: 69 BPM

## 2021-10-11 DIAGNOSIS — Z00.00 ROUTINE MEDICAL EXAM: Primary | ICD-10-CM

## 2021-10-11 DIAGNOSIS — F90.0 ATTENTION DEFICIT HYPERACTIVITY DISORDER (ADHD), PREDOMINANTLY INATTENTIVE TYPE: ICD-10-CM

## 2021-10-11 PROCEDURE — 3074F SYST BP LT 130 MM HG: CPT | Mod: CPTII,S$GLB,, | Performed by: FAMILY MEDICINE

## 2021-10-11 PROCEDURE — 99999 PR PBB SHADOW E&M-EST. PATIENT-LVL III: ICD-10-PCS | Mod: PBBFAC,,, | Performed by: FAMILY MEDICINE

## 2021-10-11 PROCEDURE — 3008F PR BODY MASS INDEX (BMI) DOCUMENTED: ICD-10-PCS | Mod: CPTII,S$GLB,, | Performed by: FAMILY MEDICINE

## 2021-10-11 PROCEDURE — 99999 PR PBB SHADOW E&M-EST. PATIENT-LVL III: CPT | Mod: PBBFAC,,, | Performed by: FAMILY MEDICINE

## 2021-10-11 PROCEDURE — 1160F PR REVIEW ALL MEDS BY PRESCRIBER/CLIN PHARMACIST DOCUMENTED: ICD-10-PCS | Mod: CPTII,S$GLB,, | Performed by: FAMILY MEDICINE

## 2021-10-11 PROCEDURE — 3078F PR MOST RECENT DIASTOLIC BLOOD PRESSURE < 80 MM HG: ICD-10-PCS | Mod: CPTII,S$GLB,, | Performed by: FAMILY MEDICINE

## 2021-10-11 PROCEDURE — 3078F DIAST BP <80 MM HG: CPT | Mod: CPTII,S$GLB,, | Performed by: FAMILY MEDICINE

## 2021-10-11 PROCEDURE — 1159F MED LIST DOCD IN RCRD: CPT | Mod: CPTII,S$GLB,, | Performed by: FAMILY MEDICINE

## 2021-10-11 PROCEDURE — 99395 PR PREVENTIVE VISIT,EST,18-39: ICD-10-PCS | Mod: S$GLB,,, | Performed by: FAMILY MEDICINE

## 2021-10-11 PROCEDURE — 3074F PR MOST RECENT SYSTOLIC BLOOD PRESSURE < 130 MM HG: ICD-10-PCS | Mod: CPTII,S$GLB,, | Performed by: FAMILY MEDICINE

## 2021-10-11 PROCEDURE — 99395 PREV VISIT EST AGE 18-39: CPT | Mod: S$GLB,,, | Performed by: FAMILY MEDICINE

## 2021-10-11 PROCEDURE — 1160F RVW MEDS BY RX/DR IN RCRD: CPT | Mod: CPTII,S$GLB,, | Performed by: FAMILY MEDICINE

## 2021-10-11 PROCEDURE — 3008F BODY MASS INDEX DOCD: CPT | Mod: CPTII,S$GLB,, | Performed by: FAMILY MEDICINE

## 2021-10-11 PROCEDURE — 1159F PR MEDICATION LIST DOCUMENTED IN MEDICAL RECORD: ICD-10-PCS | Mod: CPTII,S$GLB,, | Performed by: FAMILY MEDICINE

## 2021-10-11 RX ORDER — DEXTROAMPHETAMINE SACCHARATE, AMPHETAMINE ASPARTATE MONOHYDRATE, DEXTROAMPHETAMINE SULFATE AND AMPHETAMINE SULFATE 7.5; 7.5; 7.5; 7.5 MG/1; MG/1; MG/1; MG/1
30 CAPSULE, EXTENDED RELEASE ORAL EVERY MORNING
Qty: 30 CAPSULE | Refills: 0 | Status: SHIPPED | OUTPATIENT
Start: 2021-10-11 | End: 2021-11-25 | Stop reason: SDUPTHER

## 2021-11-26 RX ORDER — DEXTROAMPHETAMINE SACCHARATE, AMPHETAMINE ASPARTATE MONOHYDRATE, DEXTROAMPHETAMINE SULFATE AND AMPHETAMINE SULFATE 7.5; 7.5; 7.5; 7.5 MG/1; MG/1; MG/1; MG/1
30 CAPSULE, EXTENDED RELEASE ORAL EVERY MORNING
Qty: 30 CAPSULE | Refills: 0 | Status: SHIPPED | OUTPATIENT
Start: 2021-11-26 | End: 2021-12-27 | Stop reason: SDUPTHER

## 2021-12-28 RX ORDER — DEXTROAMPHETAMINE SACCHARATE, AMPHETAMINE ASPARTATE MONOHYDRATE, DEXTROAMPHETAMINE SULFATE AND AMPHETAMINE SULFATE 7.5; 7.5; 7.5; 7.5 MG/1; MG/1; MG/1; MG/1
30 CAPSULE, EXTENDED RELEASE ORAL EVERY MORNING
Qty: 30 CAPSULE | Refills: 0 | Status: SHIPPED | OUTPATIENT
Start: 2021-12-28 | End: 2022-02-04 | Stop reason: SDUPTHER

## 2022-01-18 ENCOUNTER — PATIENT MESSAGE (OUTPATIENT)
Dept: ADMINISTRATIVE | Facility: HOSPITAL | Age: 32
End: 2022-01-18
Payer: COMMERCIAL

## 2022-01-31 ENCOUNTER — TELEPHONE (OUTPATIENT)
Dept: PRIMARY CARE CLINIC | Facility: CLINIC | Age: 32
End: 2022-01-31
Payer: COMMERCIAL

## 2022-01-31 NOTE — TELEPHONE ENCOUNTER
----- Message from Jon Mckay MD sent at 1/28/2022  5:29 PM CST -----  Patient must have a follow-up before another ADD medication refill so I had to deny the refill

## 2022-02-02 ENCOUNTER — TELEPHONE (OUTPATIENT)
Dept: PRIMARY CARE CLINIC | Facility: CLINIC | Age: 32
End: 2022-02-02
Payer: COMMERCIAL

## 2022-02-04 ENCOUNTER — OFFICE VISIT (OUTPATIENT)
Dept: PRIMARY CARE CLINIC | Facility: CLINIC | Age: 32
End: 2022-02-04
Payer: COMMERCIAL

## 2022-02-04 DIAGNOSIS — F90.0 ATTENTION DEFICIT HYPERACTIVITY DISORDER (ADHD), PREDOMINANTLY INATTENTIVE TYPE: Primary | ICD-10-CM

## 2022-02-04 PROCEDURE — 1159F PR MEDICATION LIST DOCUMENTED IN MEDICAL RECORD: ICD-10-PCS | Mod: CPTII,95,, | Performed by: FAMILY MEDICINE

## 2022-02-04 PROCEDURE — 1160F RVW MEDS BY RX/DR IN RCRD: CPT | Mod: CPTII,95,, | Performed by: FAMILY MEDICINE

## 2022-02-04 PROCEDURE — 1160F PR REVIEW ALL MEDS BY PRESCRIBER/CLIN PHARMACIST DOCUMENTED: ICD-10-PCS | Mod: CPTII,95,, | Performed by: FAMILY MEDICINE

## 2022-02-04 PROCEDURE — 99213 PR OFFICE/OUTPT VISIT, EST, LEVL III, 20-29 MIN: ICD-10-PCS | Mod: 95,,, | Performed by: FAMILY MEDICINE

## 2022-02-04 PROCEDURE — 99213 OFFICE O/P EST LOW 20 MIN: CPT | Mod: 95,,, | Performed by: FAMILY MEDICINE

## 2022-02-04 PROCEDURE — 1159F MED LIST DOCD IN RCRD: CPT | Mod: CPTII,95,, | Performed by: FAMILY MEDICINE

## 2022-02-04 RX ORDER — DEXTROAMPHETAMINE SACCHARATE, AMPHETAMINE ASPARTATE MONOHYDRATE, DEXTROAMPHETAMINE SULFATE AND AMPHETAMINE SULFATE 7.5; 7.5; 7.5; 7.5 MG/1; MG/1; MG/1; MG/1
30 CAPSULE, EXTENDED RELEASE ORAL EVERY MORNING
Qty: 30 CAPSULE | Refills: 0 | Status: SHIPPED | OUTPATIENT
Start: 2022-02-04 | End: 2022-03-21 | Stop reason: SDUPTHER

## 2022-02-04 NOTE — PROGRESS NOTES
Subjective:   Patient ID: Bozena Queen is a 31 y.o. female.    Chief Complaint: No chief complaint on file.      HPI    The patient location is:  [place]  The chief complaint leading to consultation is:  ADD med follow-up    Visit type: audiovisual    Face to Face time with patient:  Four  7 minutes of total time spent on the encounter, which includes face to face time and non-face to face time preparing to see the patient (eg, review of tests), Obtaining and/or reviewing separately obtained history, Documenting clinical information in the electronic or other health record, Independently interpreting results (not separately reported) and communicating results to the patient/family/caregiver, or Care coordination (not separately reported).         Each patient to whom he or she provides medical services by telemedicine is:  (1) informed of the relationship between the physician and patient and the respective role of any other health care provider with respect to management of the patient; and (2) notified that he or she may decline to receive medical services by telemedicine and may withdraw from such care at any time.    Notes:   Very nice 31-year-old female here for follow-up of ADD medication.  Doing well.  Reviewed other medications and labs from annual October 2021    Refilled med.  Follow-up 3 months      Patient queried and denies any further complaints.    ALLERGIES AND MEDICATIONS: updated and reviewed.  Review of patient's allergies indicates:  No Known Allergies    Current Outpatient Medications:     dextroamphetamine-amphetamine (ADDERALL XR) 30 MG 24 hr capsule, Take 1 capsule (30 mg total) by mouth every morning., Disp: 30 capsule, Rfl: 0    Review of Systems   Constitutional: Negative for activity change.   HENT: Negative for hearing loss and trouble swallowing.    Eyes: Negative for discharge.   Respiratory: Negative for chest tightness and wheezing.    Cardiovascular: Negative for chest pain and  palpitations.   Gastrointestinal: Negative for constipation, diarrhea and vomiting.   Genitourinary: Negative for difficulty urinating and hematuria.   Neurological: Negative for headaches.   Psychiatric/Behavioral: Negative for dysphoric mood.       Lab Results   Component Value Date    WBC 9.81 05/14/2021    HGB 13.9 05/14/2021    HCT 42.8 05/14/2021    MCV 92 05/14/2021     05/14/2021         CMP  Sodium   Date Value Ref Range Status   05/14/2021 137 136 - 145 mmol/L Final     Potassium   Date Value Ref Range Status   05/14/2021 4.2 3.5 - 5.1 mmol/L Final     Chloride   Date Value Ref Range Status   05/14/2021 103 95 - 110 mmol/L Final     CO2   Date Value Ref Range Status   05/14/2021 22 (L) 23 - 29 mmol/L Final     Glucose   Date Value Ref Range Status   05/14/2021 81 70 - 110 mg/dL Final     BUN   Date Value Ref Range Status   05/14/2021 15 6 - 20 mg/dL Final     Creatinine   Date Value Ref Range Status   05/14/2021 0.8 0.5 - 1.4 mg/dL Final     Calcium   Date Value Ref Range Status   05/14/2021 10.3 8.7 - 10.5 mg/dL Final     Total Protein   Date Value Ref Range Status   05/14/2021 8.3 6.0 - 8.4 g/dL Final     Albumin   Date Value Ref Range Status   05/14/2021 4.1 3.5 - 5.2 g/dL Final     Total Bilirubin   Date Value Ref Range Status   05/14/2021 0.5 0.1 - 1.0 mg/dL Final     Comment:     For infants and newborns, interpretation of results should be based  on gestational age, weight and in agreement with clinical  observations.    Premature Infant recommended reference ranges:  Up to 24 hours.............<8.0 mg/dL  Up to 48 hours............<12.0 mg/dL  3-5 days..................<15.0 mg/dL  6-29 days.................<15.0 mg/dL       Alkaline Phosphatase   Date Value Ref Range Status   05/14/2021 70 55 - 135 U/L Final     AST   Date Value Ref Range Status   05/14/2021 17 10 - 40 U/L Final     ALT   Date Value Ref Range Status   05/14/2021 10 10 - 44 U/L Final     Anion Gap   Date Value Ref Range  Status   05/14/2021 12 8 - 16 mmol/L Final     eGFR if    Date Value Ref Range Status   05/14/2021 >60.0 >60 mL/min/1.73 m^2 Final     eGFR if non    Date Value Ref Range Status   05/14/2021 >60.0 >60 mL/min/1.73 m^2 Final     Comment:     Calculation used to obtain the estimated glomerular filtration  rate (eGFR) is the CKD-EPI equation.           Lab Results   Component Value Date    HGBA1C 5.1 10/09/2020        Objective:   There were no vitals filed for this visit.  There is no height or weight on file to calculate BMI.    Physical Exam  Constitutional:       Appearance: Normal appearance. She is not ill-appearing.   Neurological:      Mental Status: She is alert.   Psychiatric:         Mood and Affect: Mood normal.         Behavior: Behavior normal.         Assessment and Plan:   Diagnoses and all orders for this visit:    Attention deficit hyperactivity disorder (ADHD), predominantly inattentive type    Other orders  -     dextroamphetamine-amphetamine (ADDERALL XR) 30 MG 24 hr capsule; Take 1 capsule (30 mg total) by mouth every morning.        No follow-ups on file.    THIS NOTE WILL BE SHARED WITH THE PATIENT.

## 2022-05-30 ENCOUNTER — OFFICE VISIT (OUTPATIENT)
Dept: PRIMARY CARE CLINIC | Facility: CLINIC | Age: 32
End: 2022-05-30
Payer: COMMERCIAL

## 2022-05-30 ENCOUNTER — PATIENT MESSAGE (OUTPATIENT)
Dept: ADMINISTRATIVE | Facility: HOSPITAL | Age: 32
End: 2022-05-30
Payer: COMMERCIAL

## 2022-05-30 VITALS
WEIGHT: 235.44 LBS | DIASTOLIC BLOOD PRESSURE: 82 MMHG | HEART RATE: 64 BPM | BODY MASS INDEX: 41.71 KG/M2 | HEIGHT: 63 IN | OXYGEN SATURATION: 99 % | TEMPERATURE: 98 F | SYSTOLIC BLOOD PRESSURE: 122 MMHG

## 2022-05-30 DIAGNOSIS — F90.0 ATTENTION DEFICIT HYPERACTIVITY DISORDER (ADHD), PREDOMINANTLY INATTENTIVE TYPE: Primary | ICD-10-CM

## 2022-05-30 DIAGNOSIS — Z71.84 COUNSELING ABOUT TRAVEL: ICD-10-CM

## 2022-05-30 PROCEDURE — 1159F MED LIST DOCD IN RCRD: CPT | Mod: CPTII,S$GLB,, | Performed by: FAMILY MEDICINE

## 2022-05-30 PROCEDURE — 1159F PR MEDICATION LIST DOCUMENTED IN MEDICAL RECORD: ICD-10-PCS | Mod: CPTII,S$GLB,, | Performed by: FAMILY MEDICINE

## 2022-05-30 PROCEDURE — 99999 PR PBB SHADOW E&M-EST. PATIENT-LVL III: CPT | Mod: PBBFAC,,, | Performed by: FAMILY MEDICINE

## 2022-05-30 PROCEDURE — 3008F BODY MASS INDEX DOCD: CPT | Mod: CPTII,S$GLB,, | Performed by: FAMILY MEDICINE

## 2022-05-30 PROCEDURE — 3079F DIAST BP 80-89 MM HG: CPT | Mod: CPTII,S$GLB,, | Performed by: FAMILY MEDICINE

## 2022-05-30 PROCEDURE — 3008F PR BODY MASS INDEX (BMI) DOCUMENTED: ICD-10-PCS | Mod: CPTII,S$GLB,, | Performed by: FAMILY MEDICINE

## 2022-05-30 PROCEDURE — 1160F PR REVIEW ALL MEDS BY PRESCRIBER/CLIN PHARMACIST DOCUMENTED: ICD-10-PCS | Mod: CPTII,S$GLB,, | Performed by: FAMILY MEDICINE

## 2022-05-30 PROCEDURE — 3074F PR MOST RECENT SYSTOLIC BLOOD PRESSURE < 130 MM HG: ICD-10-PCS | Mod: CPTII,S$GLB,, | Performed by: FAMILY MEDICINE

## 2022-05-30 PROCEDURE — 1160F RVW MEDS BY RX/DR IN RCRD: CPT | Mod: CPTII,S$GLB,, | Performed by: FAMILY MEDICINE

## 2022-05-30 PROCEDURE — 99214 PR OFFICE/OUTPT VISIT, EST, LEVL IV, 30-39 MIN: ICD-10-PCS | Mod: S$GLB,,, | Performed by: FAMILY MEDICINE

## 2022-05-30 PROCEDURE — 99214 OFFICE O/P EST MOD 30 MIN: CPT | Mod: S$GLB,,, | Performed by: FAMILY MEDICINE

## 2022-05-30 PROCEDURE — 3079F PR MOST RECENT DIASTOLIC BLOOD PRESSURE 80-89 MM HG: ICD-10-PCS | Mod: CPTII,S$GLB,, | Performed by: FAMILY MEDICINE

## 2022-05-30 PROCEDURE — 3074F SYST BP LT 130 MM HG: CPT | Mod: CPTII,S$GLB,, | Performed by: FAMILY MEDICINE

## 2022-05-30 PROCEDURE — 99999 PR PBB SHADOW E&M-EST. PATIENT-LVL III: ICD-10-PCS | Mod: PBBFAC,,, | Performed by: FAMILY MEDICINE

## 2022-05-30 RX ORDER — DEXTROAMPHETAMINE SACCHARATE, AMPHETAMINE ASPARTATE MONOHYDRATE, DEXTROAMPHETAMINE SULFATE AND AMPHETAMINE SULFATE 7.5; 7.5; 7.5; 7.5 MG/1; MG/1; MG/1; MG/1
30 CAPSULE, EXTENDED RELEASE ORAL EVERY MORNING
Qty: 30 CAPSULE | Refills: 0 | Status: SHIPPED | OUTPATIENT
Start: 2022-05-30 | End: 2022-07-11 | Stop reason: SDUPTHER

## 2022-05-30 RX ORDER — SCOLOPAMINE TRANSDERMAL SYSTEM 1 MG/1
1 PATCH, EXTENDED RELEASE TRANSDERMAL
Qty: 4 PATCH | Refills: 1 | Status: SHIPPED | OUTPATIENT
Start: 2022-05-30 | End: 2023-01-18

## 2022-06-09 NOTE — PROGRESS NOTES
Subjective:   Patient ID: Bozena Queen is a 31 y.o. female.    Chief Complaint: Medication Refill      HPI    31-year-old female with ADD doing well with Adderall 30 mg daily.  Here for follow-up    Also going on a trip soon and would like to consider scopolamine patches        Patient queried and denies any further complaints.    ALLERGIES AND MEDICATIONS: updated and reviewed.  Review of patient's allergies indicates:  No Known Allergies    Current Outpatient Medications:     dextroamphetamine-amphetamine (ADDERALL XR) 30 MG 24 hr capsule, Take 1 capsule (30 mg total) by mouth every morning., Disp: 30 capsule, Rfl: 0    scopolamine (TRANSDERM-SCOP) 1.3-1.5 mg (1 mg over 3 days), Place 1 patch onto the skin every 72 hours. Apply first about one hour prior to trip, Disp: 4 patch, Rfl: 1    Review of Systems   Constitutional: Negative for activity change, chills, diaphoresis, fatigue and fever.   HENT: Negative for congestion, postnasal drip, sinus pressure, sinus pain and sore throat.    Respiratory: Negative for cough, chest tightness and shortness of breath.    Cardiovascular: Negative for chest pain and palpitations.   Gastrointestinal: Negative for abdominal pain, nausea and vomiting.   Genitourinary: Negative for dysuria.   Musculoskeletal: Negative for arthralgias, back pain and myalgias.   Skin: Negative for rash.   Neurological: Negative for dizziness, weakness and headaches.   Psychiatric/Behavioral: Negative for dysphoric mood and sleep disturbance. The patient is not nervous/anxious.        Lab Results   Component Value Date    WBC 9.81 05/14/2021    HGB 13.9 05/14/2021    HCT 42.8 05/14/2021    MCV 92 05/14/2021     05/14/2021         CMP  Sodium   Date Value Ref Range Status   05/14/2021 137 136 - 145 mmol/L Final     Potassium   Date Value Ref Range Status   05/14/2021 4.2 3.5 - 5.1 mmol/L Final     Chloride   Date Value Ref Range Status   05/14/2021 103 95 - 110 mmol/L Final     CO2   Date  "Value Ref Range Status   05/14/2021 22 (L) 23 - 29 mmol/L Final     Glucose   Date Value Ref Range Status   05/14/2021 81 70 - 110 mg/dL Final     BUN   Date Value Ref Range Status   05/14/2021 15 6 - 20 mg/dL Final     Creatinine   Date Value Ref Range Status   05/14/2021 0.8 0.5 - 1.4 mg/dL Final     Calcium   Date Value Ref Range Status   05/14/2021 10.3 8.7 - 10.5 mg/dL Final     Total Protein   Date Value Ref Range Status   05/14/2021 8.3 6.0 - 8.4 g/dL Final     Albumin   Date Value Ref Range Status   05/14/2021 4.1 3.5 - 5.2 g/dL Final     Total Bilirubin   Date Value Ref Range Status   05/14/2021 0.5 0.1 - 1.0 mg/dL Final     Comment:     For infants and newborns, interpretation of results should be based  on gestational age, weight and in agreement with clinical  observations.    Premature Infant recommended reference ranges:  Up to 24 hours.............<8.0 mg/dL  Up to 48 hours............<12.0 mg/dL  3-5 days..................<15.0 mg/dL  6-29 days.................<15.0 mg/dL       Alkaline Phosphatase   Date Value Ref Range Status   05/14/2021 70 55 - 135 U/L Final     AST   Date Value Ref Range Status   05/14/2021 17 10 - 40 U/L Final     ALT   Date Value Ref Range Status   05/14/2021 10 10 - 44 U/L Final     Anion Gap   Date Value Ref Range Status   05/14/2021 12 8 - 16 mmol/L Final     eGFR if    Date Value Ref Range Status   05/14/2021 >60.0 >60 mL/min/1.73 m^2 Final     eGFR if non    Date Value Ref Range Status   05/14/2021 >60.0 >60 mL/min/1.73 m^2 Final     Comment:     Calculation used to obtain the estimated glomerular filtration  rate (eGFR) is the CKD-EPI equation.           Lab Results   Component Value Date    HGBA1C 5.1 10/09/2020        Objective:     Vitals:    05/30/22 1621   BP: 122/82   Pulse: 64   Temp: 98.2 °F (36.8 °C)   TempSrc: Oral   SpO2: 99%   Weight: 106.8 kg (235 lb 7.2 oz)   Height: 5' 3" (1.6 m)   PainSc: 0-No pain     Body mass index is " 41.71 kg/m².    Physical Exam  Vitals and nursing note reviewed.   Constitutional:       General: She is not in acute distress.     Appearance: She is well-developed. She is not diaphoretic.   HENT:      Head: Normocephalic and atraumatic.      Right Ear: Hearing, tympanic membrane, ear canal and external ear normal. No tenderness.      Left Ear: Hearing, tympanic membrane, ear canal and external ear normal. No tenderness.      Nose: Nose normal.      Mouth/Throat:      Dentition: Normal dentition.      Pharynx: No oropharyngeal exudate or posterior oropharyngeal erythema.   Eyes:      General: Lids are normal. No scleral icterus.        Right eye: No discharge.         Left eye: No discharge.      Extraocular Movements:      Right eye: Normal extraocular motion.      Left eye: Normal extraocular motion.      Conjunctiva/sclera: Conjunctivae normal.      Right eye: Right conjunctiva is not injected.      Left eye: Left conjunctiva is not injected.   Neck:      Thyroid: No thyromegaly.      Vascular: No carotid bruit or JVD.      Trachea: No tracheal deviation.   Cardiovascular:      Rate and Rhythm: Normal rate and regular rhythm.      Pulses: Normal pulses.      Heart sounds: Normal heart sounds. No murmur heard.    No friction rub.   Pulmonary:      Effort: Pulmonary effort is normal. No accessory muscle usage or respiratory distress.      Breath sounds: Normal breath sounds. No wheezing, rhonchi or rales.   Musculoskeletal:      Cervical back: Normal range of motion and neck supple. No edema.   Lymphadenopathy:      Head:      Right side of head: No submandibular adenopathy.      Left side of head: No submandibular adenopathy.      Cervical: No cervical adenopathy.   Skin:     General: Skin is warm and dry.   Neurological:      Mental Status: She is alert and oriented to person, place, and time.   Psychiatric:         Mood and Affect: Mood is not anxious or depressed. Affect is not labile, angry or inappropriate.          Speech: Speech normal.         Behavior: Behavior normal. Behavior is cooperative.         Thought Content: Thought content normal.         Assessment and Plan:   Bozena was seen today for medication refill.    Diagnoses and all orders for this visit:    Attention deficit hyperactivity disorder (ADHD), predominantly inattentive type    Counseling about travel    Other orders  -     dextroamphetamine-amphetamine (ADDERALL XR) 30 MG 24 hr capsule; Take 1 capsule (30 mg total) by mouth every morning.  -     scopolamine (TRANSDERM-SCOP) 1.3-1.5 mg (1 mg over 3 days); Place 1 patch onto the skin every 72 hours. Apply first about one hour prior to trip    Continue ADD medication    Counseling on travel/cruise along with scopolamine patches and education on use    Follow-up 3 months        No follow-ups on file.    THIS NOTE WILL BE SHARED WITH THE PATIENT.

## 2022-07-12 ENCOUNTER — PATIENT MESSAGE (OUTPATIENT)
Dept: ADMINISTRATIVE | Facility: HOSPITAL | Age: 32
End: 2022-07-12
Payer: COMMERCIAL

## 2022-07-14 ENCOUNTER — PATIENT MESSAGE (OUTPATIENT)
Dept: PRIMARY CARE CLINIC | Facility: CLINIC | Age: 32
End: 2022-07-14
Payer: COMMERCIAL

## 2022-09-23 ENCOUNTER — OFFICE VISIT (OUTPATIENT)
Dept: PRIMARY CARE CLINIC | Facility: CLINIC | Age: 32
End: 2022-09-23
Payer: COMMERCIAL

## 2022-09-23 DIAGNOSIS — F90.0 ATTENTION DEFICIT HYPERACTIVITY DISORDER (ADHD), PREDOMINANTLY INATTENTIVE TYPE: Primary | ICD-10-CM

## 2022-09-23 PROCEDURE — 1159F PR MEDICATION LIST DOCUMENTED IN MEDICAL RECORD: ICD-10-PCS | Mod: CPTII,95,, | Performed by: FAMILY MEDICINE

## 2022-09-23 PROCEDURE — 1159F MED LIST DOCD IN RCRD: CPT | Mod: CPTII,95,, | Performed by: FAMILY MEDICINE

## 2022-09-23 PROCEDURE — 99213 PR OFFICE/OUTPT VISIT, EST, LEVL III, 20-29 MIN: ICD-10-PCS | Mod: 95,,, | Performed by: FAMILY MEDICINE

## 2022-09-23 PROCEDURE — 1160F PR REVIEW ALL MEDS BY PRESCRIBER/CLIN PHARMACIST DOCUMENTED: ICD-10-PCS | Mod: CPTII,95,, | Performed by: FAMILY MEDICINE

## 2022-09-23 PROCEDURE — 99213 OFFICE O/P EST LOW 20 MIN: CPT | Mod: 95,,, | Performed by: FAMILY MEDICINE

## 2022-09-23 PROCEDURE — 1160F RVW MEDS BY RX/DR IN RCRD: CPT | Mod: CPTII,95,, | Performed by: FAMILY MEDICINE

## 2022-09-23 RX ORDER — DEXTROAMPHETAMINE SACCHARATE, AMPHETAMINE ASPARTATE MONOHYDRATE, DEXTROAMPHETAMINE SULFATE AND AMPHETAMINE SULFATE 7.5; 7.5; 7.5; 7.5 MG/1; MG/1; MG/1; MG/1
30 CAPSULE, EXTENDED RELEASE ORAL EVERY MORNING
Qty: 30 CAPSULE | Refills: 0 | Status: SHIPPED | OUTPATIENT
Start: 2022-09-23 | End: 2022-10-25 | Stop reason: SDUPTHER

## 2022-09-23 NOTE — PROGRESS NOTES
There were no vitals taken for this visit.    Chief Complaint: No chief complaint on file.        Bozena Queen is a 31 y.o. female here for    HPI    The patient location is:  [place]  The chief complaint leading to consultation is:  ADD medication follow-up    Visit type: audiovisual    Face to Face time with patient:  3 minutes  Six minutes of total time spent on the encounter, which includes face to face time and non-face to face time preparing to see the patient (eg, review of tests), Obtaining and/or reviewing separately obtained history, Documenting clinical information in the electronic or other health record, Independently interpreting results (not separately reported) and communicating results to the patient/family/caregiver, or Care coordination (not separately reported).         Each patient to whom he or she provides medical services by telemedicine is:  (1) informed of the relationship between the physician and patient and the respective role of any other health care provider with respect to management of the patient; and (2) notified that he or she may decline to receive medical services by telemedicine and may withdraw from such care at any time.    Notes:     31-year-old female here for ADD medication follow-up.  Doing well with dextroamphetamine-amphetamine XR 30 mg daily.      Reviewed past labs.  She will set up an annual in about 3 months.  Meds reviewed.  Refill done.  Patient queried and denies any further complaints    SURGICAL AND MEDICAL HISTORY: updated and reviewed.  ALLERGIES updated and reviewed.  Review of patient's allergies indicates:  No Known Allergies  CURRENT OUTPATIENT MEDICATIONS updated and reviewed    Current Outpatient Medications:     dextroamphetamine-amphetamine (ADDERALL XR) 30 MG 24 hr capsule, Take 1 capsule (30 mg total) by mouth every morning., Disp: 30 capsule, Rfl: 0    scopolamine (TRANSDERM-SCOP) 1.3-1.5 mg (1 mg over 3 days), Place 1 patch onto the skin every  72 hours. Apply first about one hour prior to trip, Disp: 4 patch, Rfl: 1    Review of Systems   Constitutional:  Negative for activity change and unexpected weight change.   HENT:  Negative for hearing loss and trouble swallowing.    Eyes:  Negative for discharge.   Respiratory:  Negative for chest tightness and wheezing.    Cardiovascular:  Negative for chest pain and palpitations.   Gastrointestinal:  Negative for constipation, diarrhea and vomiting.   Genitourinary:  Negative for difficulty urinating and hematuria.   Musculoskeletal:  Negative for neck pain.   Neurological:  Negative for headaches.   Psychiatric/Behavioral:  Negative for dysphoric mood.      There were no vitals taken for this visit.  Physical Exam  Constitutional:       General: She is not in acute distress.     Appearance: Normal appearance. She is not ill-appearing.   Neurological:      Mental Status: She is alert.   Psychiatric:         Mood and Affect: Mood normal.         Behavior: Behavior normal.     Diagnoses and all orders for this visit:    Attention deficit hyperactivity disorder (ADHD), predominantly inattentive type    Other orders  -     dextroamphetamine-amphetamine (ADDERALL XR) 30 MG 24 hr capsule; Take 1 capsule (30 mg total) by mouth every morning.              Jon Mckay MD        ===========================================

## 2023-01-18 ENCOUNTER — LAB VISIT (OUTPATIENT)
Dept: LAB | Facility: HOSPITAL | Age: 33
End: 2023-01-18
Attending: FAMILY MEDICINE
Payer: COMMERCIAL

## 2023-01-18 ENCOUNTER — OFFICE VISIT (OUTPATIENT)
Dept: PRIMARY CARE CLINIC | Facility: CLINIC | Age: 33
End: 2023-01-18
Payer: COMMERCIAL

## 2023-01-18 VITALS
TEMPERATURE: 98 F | HEART RATE: 94 BPM | BODY MASS INDEX: 43.36 KG/M2 | SYSTOLIC BLOOD PRESSURE: 120 MMHG | DIASTOLIC BLOOD PRESSURE: 70 MMHG | OXYGEN SATURATION: 99 % | HEIGHT: 63 IN | WEIGHT: 244.69 LBS

## 2023-01-18 DIAGNOSIS — F90.0 ATTENTION DEFICIT HYPERACTIVITY DISORDER (ADHD), PREDOMINANTLY INATTENTIVE TYPE: ICD-10-CM

## 2023-01-18 DIAGNOSIS — Z00.00 ROUTINE MEDICAL EXAM: Primary | ICD-10-CM

## 2023-01-18 DIAGNOSIS — Z00.00 ROUTINE MEDICAL EXAM: ICD-10-CM

## 2023-01-18 DIAGNOSIS — E66.01 CLASS 3 SEVERE OBESITY DUE TO EXCESS CALORIES WITHOUT SERIOUS COMORBIDITY WITH BODY MASS INDEX (BMI) OF 40.0 TO 44.9 IN ADULT: ICD-10-CM

## 2023-01-18 LAB
ALBUMIN SERPL BCP-MCNC: 3.8 G/DL (ref 3.5–5.2)
ALP SERPL-CCNC: 59 U/L (ref 55–135)
ALT SERPL W/O P-5'-P-CCNC: 12 U/L (ref 10–44)
ANION GAP SERPL CALC-SCNC: 9 MMOL/L (ref 8–16)
AST SERPL-CCNC: 15 U/L (ref 10–40)
BILIRUB SERPL-MCNC: 0.5 MG/DL (ref 0.1–1)
BUN SERPL-MCNC: 8 MG/DL (ref 6–20)
CALCIUM SERPL-MCNC: 9.3 MG/DL (ref 8.7–10.5)
CHLORIDE SERPL-SCNC: 107 MMOL/L (ref 95–110)
CHOLEST SERPL-MCNC: 183 MG/DL (ref 120–199)
CHOLEST/HDLC SERPL: 4.1 {RATIO} (ref 2–5)
CO2 SERPL-SCNC: 21 MMOL/L (ref 23–29)
CREAT SERPL-MCNC: 0.8 MG/DL (ref 0.5–1.4)
ERYTHROCYTE [DISTWIDTH] IN BLOOD BY AUTOMATED COUNT: 12.2 % (ref 11.5–14.5)
EST. GFR  (NO RACE VARIABLE): >60 ML/MIN/1.73 M^2
ESTIMATED AVG GLUCOSE: 103 MG/DL (ref 68–131)
GLUCOSE SERPL-MCNC: 97 MG/DL (ref 70–110)
HBA1C MFR BLD: 5.2 % (ref 4–5.6)
HCT VFR BLD AUTO: 40.4 % (ref 37–48.5)
HDLC SERPL-MCNC: 45 MG/DL (ref 40–75)
HDLC SERPL: 24.6 % (ref 20–50)
HGB BLD-MCNC: 13.5 G/DL (ref 12–16)
LDLC SERPL CALC-MCNC: 124.4 MG/DL (ref 63–159)
MCH RBC QN AUTO: 30.4 PG (ref 27–31)
MCHC RBC AUTO-ENTMCNC: 33.4 G/DL (ref 32–36)
MCV RBC AUTO: 91 FL (ref 82–98)
NONHDLC SERPL-MCNC: 138 MG/DL
PLATELET # BLD AUTO: 423 K/UL (ref 150–450)
PMV BLD AUTO: 10.1 FL (ref 9.2–12.9)
POTASSIUM SERPL-SCNC: 4.6 MMOL/L (ref 3.5–5.1)
PROT SERPL-MCNC: 7.2 G/DL (ref 6–8.4)
RBC # BLD AUTO: 4.44 M/UL (ref 4–5.4)
SODIUM SERPL-SCNC: 137 MMOL/L (ref 136–145)
TRIGL SERPL-MCNC: 68 MG/DL (ref 30–150)
TSH SERPL DL<=0.005 MIU/L-ACNC: 1.24 UIU/ML (ref 0.4–4)
WBC # BLD AUTO: 8.98 K/UL (ref 3.9–12.7)

## 2023-01-18 PROCEDURE — 1159F MED LIST DOCD IN RCRD: CPT | Mod: CPTII,S$GLB,, | Performed by: FAMILY MEDICINE

## 2023-01-18 PROCEDURE — 80053 COMPREHEN METABOLIC PANEL: CPT | Performed by: FAMILY MEDICINE

## 2023-01-18 PROCEDURE — 99999 PR PBB SHADOW E&M-EST. PATIENT-LVL III: ICD-10-PCS | Mod: PBBFAC,,, | Performed by: FAMILY MEDICINE

## 2023-01-18 PROCEDURE — 3008F PR BODY MASS INDEX (BMI) DOCUMENTED: ICD-10-PCS | Mod: CPTII,S$GLB,, | Performed by: FAMILY MEDICINE

## 2023-01-18 PROCEDURE — 3074F SYST BP LT 130 MM HG: CPT | Mod: CPTII,S$GLB,, | Performed by: FAMILY MEDICINE

## 2023-01-18 PROCEDURE — 3078F DIAST BP <80 MM HG: CPT | Mod: CPTII,S$GLB,, | Performed by: FAMILY MEDICINE

## 2023-01-18 PROCEDURE — 3078F PR MOST RECENT DIASTOLIC BLOOD PRESSURE < 80 MM HG: ICD-10-PCS | Mod: CPTII,S$GLB,, | Performed by: FAMILY MEDICINE

## 2023-01-18 PROCEDURE — 1160F RVW MEDS BY RX/DR IN RCRD: CPT | Mod: CPTII,S$GLB,, | Performed by: FAMILY MEDICINE

## 2023-01-18 PROCEDURE — 83036 HEMOGLOBIN GLYCOSYLATED A1C: CPT | Performed by: FAMILY MEDICINE

## 2023-01-18 PROCEDURE — 99395 PREV VISIT EST AGE 18-39: CPT | Mod: S$GLB,,, | Performed by: FAMILY MEDICINE

## 2023-01-18 PROCEDURE — 1159F PR MEDICATION LIST DOCUMENTED IN MEDICAL RECORD: ICD-10-PCS | Mod: CPTII,S$GLB,, | Performed by: FAMILY MEDICINE

## 2023-01-18 PROCEDURE — 3074F PR MOST RECENT SYSTOLIC BLOOD PRESSURE < 130 MM HG: ICD-10-PCS | Mod: CPTII,S$GLB,, | Performed by: FAMILY MEDICINE

## 2023-01-18 PROCEDURE — 3044F PR MOST RECENT HEMOGLOBIN A1C LEVEL <7.0%: ICD-10-PCS | Mod: CPTII,S$GLB,, | Performed by: FAMILY MEDICINE

## 2023-01-18 PROCEDURE — 3008F BODY MASS INDEX DOCD: CPT | Mod: CPTII,S$GLB,, | Performed by: FAMILY MEDICINE

## 2023-01-18 PROCEDURE — 1160F PR REVIEW ALL MEDS BY PRESCRIBER/CLIN PHARMACIST DOCUMENTED: ICD-10-PCS | Mod: CPTII,S$GLB,, | Performed by: FAMILY MEDICINE

## 2023-01-18 PROCEDURE — 85027 COMPLETE CBC AUTOMATED: CPT | Performed by: FAMILY MEDICINE

## 2023-01-18 PROCEDURE — 3044F HG A1C LEVEL LT 7.0%: CPT | Mod: CPTII,S$GLB,, | Performed by: FAMILY MEDICINE

## 2023-01-18 PROCEDURE — 99395 PR PREVENTIVE VISIT,EST,18-39: ICD-10-PCS | Mod: S$GLB,,, | Performed by: FAMILY MEDICINE

## 2023-01-18 PROCEDURE — 99999 PR PBB SHADOW E&M-EST. PATIENT-LVL III: CPT | Mod: PBBFAC,,, | Performed by: FAMILY MEDICINE

## 2023-01-18 PROCEDURE — 36415 COLL VENOUS BLD VENIPUNCTURE: CPT | Mod: PN | Performed by: FAMILY MEDICINE

## 2023-01-18 PROCEDURE — 80061 LIPID PANEL: CPT | Performed by: FAMILY MEDICINE

## 2023-01-18 PROCEDURE — 84443 ASSAY THYROID STIM HORMONE: CPT | Performed by: FAMILY MEDICINE

## 2023-01-24 PROBLEM — E66.01 CLASS 3 SEVERE OBESITY DUE TO EXCESS CALORIES WITHOUT SERIOUS COMORBIDITY WITH BODY MASS INDEX (BMI) OF 40.0 TO 44.9 IN ADULT: Status: ACTIVE | Noted: 2023-01-24

## 2023-01-24 PROBLEM — E66.813 CLASS 3 SEVERE OBESITY DUE TO EXCESS CALORIES WITHOUT SERIOUS COMORBIDITY WITH BODY MASS INDEX (BMI) OF 40.0 TO 44.9 IN ADULT: Status: ACTIVE | Noted: 2023-01-24

## 2023-01-24 NOTE — PROGRESS NOTES
"    /70 (BP Location: Left arm, Patient Position: Sitting, BP Method: Large (Manual))   Pulse 94   Temp 98.3 °F (36.8 °C) (Oral)   Ht 5' 3" (1.6 m)   Wt 111 kg (244 lb 11.4 oz)   LMP 01/09/2023   SpO2 99%   BMI 43.35 kg/m²       ===========    Chief Complaint: No chief complaint on file.        Bozena Queen is a 32 y.o. female here for    [facility]    Annual Exam.  Health maintenance reviewed with pt in detail inc screening studies such as lab studies and vaccines    Patient queried and denies any further complaints    SURGICAL AND MEDICAL HISTORY: updated and reviewed.  ALLERGIES updated and reviewed.  Review of patient's allergies indicates:  No Known Allergies  CURRENT OUTPATIENT MEDICATIONS updated and reviewed    Current Outpatient Medications:     dextroamphetamine-amphetamine (ADDERALL XR) 30 MG 24 hr capsule, Take 1 capsule (30 mg total) by mouth every morning., Disp: 30 capsule, Rfl: 0    dextroamphetamine-amphetamine (ADDERALL XR) 30 MG 24 hr capsule, Take 1 capsule (30 mg total) by mouth every morning., Disp: 30 capsule, Rfl: 0    Review of Systems   Constitutional:  Negative for activity change, appetite change, chills, diaphoresis, fatigue, fever and unexpected weight change.   HENT:  Negative for congestion, ear discharge, ear pain, facial swelling, hearing loss, nosebleeds, postnasal drip, rhinorrhea, sinus pressure, sneezing, sore throat, tinnitus, trouble swallowing and voice change.    Eyes:  Negative for photophobia, pain, discharge, redness, itching and visual disturbance.   Respiratory:  Negative for cough, chest tightness, shortness of breath and wheezing.    Cardiovascular:  Negative for chest pain, palpitations and leg swelling.   Gastrointestinal:  Negative for abdominal distention, abdominal pain, anal bleeding, blood in stool, constipation, diarrhea, nausea, rectal pain and vomiting.   Endocrine: Negative for cold intolerance, heat intolerance, polydipsia, polyphagia and polyuria. " "  Genitourinary:  Negative for difficulty urinating, dysuria and flank pain.   Musculoskeletal:  Negative for arthralgias, back pain, joint swelling, myalgias and neck pain.   Skin:  Negative for rash.   Neurological:  Negative for dizziness, tremors, seizures, syncope, speech difficulty, weakness, light-headedness, numbness and headaches.   Psychiatric/Behavioral:  Negative for behavioral problems, confusion, decreased concentration, dysphoric mood, sleep disturbance and suicidal ideas. The patient is not nervous/anxious and is not hyperactive.      /70 (BP Location: Left arm, Patient Position: Sitting, BP Method: Large (Manual))   Pulse 94   Temp 98.3 °F (36.8 °C) (Oral)   Ht 5' 3" (1.6 m)   Wt 111 kg (244 lb 11.4 oz)   LMP 01/09/2023   SpO2 99%   BMI 43.35 kg/m²   Physical Exam  Vitals and nursing note reviewed.   Constitutional:       General: She is not in acute distress.     Appearance: Normal appearance. She is well-developed. She is not ill-appearing or toxic-appearing.   HENT:      Head: Normocephalic and atraumatic.      Right Ear: Tympanic membrane, ear canal and external ear normal.      Left Ear: Tympanic membrane, ear canal and external ear normal.      Nose: Nose normal.      Mouth/Throat:      Lips: Pink.      Mouth: Mucous membranes are moist.      Pharynx: No oropharyngeal exudate or posterior oropharyngeal erythema.   Eyes:      General: No scleral icterus.        Right eye: No discharge.         Left eye: No discharge.      Extraocular Movements: Extraocular movements intact.      Conjunctiva/sclera: Conjunctivae normal.   Cardiovascular:      Rate and Rhythm: Normal rate and regular rhythm.      Pulses: Normal pulses.      Heart sounds: Normal heart sounds. No murmur heard.  Pulmonary:      Effort: Pulmonary effort is normal. No respiratory distress.      Breath sounds: Normal breath sounds. No wheezing or rales.   Abdominal:      General: Bowel sounds are normal. There is no " distension.      Palpations: Abdomen is soft. There is no mass.      Tenderness: There is no abdominal tenderness. There is no right CVA tenderness, left CVA tenderness, guarding or rebound.      Hernia: No hernia is present.   Musculoskeletal:      Cervical back: Normal range of motion and neck supple. No rigidity or tenderness.   Lymphadenopathy:      Cervical: No cervical adenopathy.   Skin:     General: Skin is warm and dry.   Neurological:      General: No focal deficit present.      Mental Status: She is alert. Mental status is at baseline.   Psychiatric:         Mood and Affect: Mood normal.         Behavior: Behavior normal. Behavior is cooperative.       Diagnoses and all orders for this visit:    Routine medical exam  -     CBC Without Differential; Future  -     Comprehensive Metabolic Panel; Future  -     Lipid Panel; Future  -     TSH; Future  -     Hemoglobin A1C; Future    Attention deficit hyperactivity disorder (ADHD), predominantly inattentive type    Class 3 severe obesity due to excess calories without serious comorbidity with body mass index (BMI) of 40.0 to 44.9 in adult          Reviewed old pertinent medical records available.     All lab results personally reviewed and interpreted by me including last year, if available.        Jon Mckay MD

## 2023-03-08 DIAGNOSIS — F90.0 ATTENTION DEFICIT HYPERACTIVITY DISORDER (ADHD), PREDOMINANTLY INATTENTIVE TYPE: Primary | ICD-10-CM

## 2023-03-08 NOTE — TELEPHONE ENCOUNTER
No new care gaps identified.  [facility] Embedded Care Gaps. Reference number: [id number]. 3/08/2023   9:01:38 AM CST

## 2023-03-09 RX ORDER — DEXTROAMPHETAMINE SACCHARATE, AMPHETAMINE ASPARTATE MONOHYDRATE, DEXTROAMPHETAMINE SULFATE AND AMPHETAMINE SULFATE 7.5; 7.5; 7.5; 7.5 MG/1; MG/1; MG/1; MG/1
30 CAPSULE, EXTENDED RELEASE ORAL EVERY MORNING
Qty: 30 CAPSULE | Refills: 0 | Status: SHIPPED | OUTPATIENT
Start: 2023-03-09 | End: 2023-04-14 | Stop reason: SDUPTHER

## 2023-04-11 DIAGNOSIS — F90.0 ATTENTION DEFICIT HYPERACTIVITY DISORDER (ADHD), PREDOMINANTLY INATTENTIVE TYPE: ICD-10-CM

## 2023-04-11 NOTE — TELEPHONE ENCOUNTER
No new care gaps identified.  [facility] Embedded Care Gaps. Reference number: [id number]. 4/11/2023   9:55:30 AM CDT

## 2023-04-12 RX ORDER — DEXTROAMPHETAMINE SACCHARATE, AMPHETAMINE ASPARTATE MONOHYDRATE, DEXTROAMPHETAMINE SULFATE AND AMPHETAMINE SULFATE 7.5; 7.5; 7.5; 7.5 MG/1; MG/1; MG/1; MG/1
30 CAPSULE, EXTENDED RELEASE ORAL EVERY MORNING
Qty: 30 CAPSULE | Refills: 0 | OUTPATIENT
Start: 2023-04-12

## 2023-04-14 ENCOUNTER — OFFICE VISIT (OUTPATIENT)
Dept: PRIMARY CARE CLINIC | Facility: CLINIC | Age: 33
End: 2023-04-14
Payer: COMMERCIAL

## 2023-04-14 DIAGNOSIS — F90.0 ATTENTION DEFICIT HYPERACTIVITY DISORDER (ADHD), PREDOMINANTLY INATTENTIVE TYPE: ICD-10-CM

## 2023-04-14 PROCEDURE — 1159F MED LIST DOCD IN RCRD: CPT | Mod: CPTII,95,, | Performed by: FAMILY MEDICINE

## 2023-04-14 PROCEDURE — 1160F RVW MEDS BY RX/DR IN RCRD: CPT | Mod: CPTII,95,, | Performed by: FAMILY MEDICINE

## 2023-04-14 PROCEDURE — 99213 PR OFFICE/OUTPT VISIT, EST, LEVL III, 20-29 MIN: ICD-10-PCS | Mod: 95,,, | Performed by: FAMILY MEDICINE

## 2023-04-14 PROCEDURE — 1160F PR REVIEW ALL MEDS BY PRESCRIBER/CLIN PHARMACIST DOCUMENTED: ICD-10-PCS | Mod: CPTII,95,, | Performed by: FAMILY MEDICINE

## 2023-04-14 PROCEDURE — 1159F PR MEDICATION LIST DOCUMENTED IN MEDICAL RECORD: ICD-10-PCS | Mod: CPTII,95,, | Performed by: FAMILY MEDICINE

## 2023-04-14 PROCEDURE — 3044F HG A1C LEVEL LT 7.0%: CPT | Mod: CPTII,95,, | Performed by: FAMILY MEDICINE

## 2023-04-14 PROCEDURE — 3044F PR MOST RECENT HEMOGLOBIN A1C LEVEL <7.0%: ICD-10-PCS | Mod: CPTII,95,, | Performed by: FAMILY MEDICINE

## 2023-04-14 PROCEDURE — 99213 OFFICE O/P EST LOW 20 MIN: CPT | Mod: 95,,, | Performed by: FAMILY MEDICINE

## 2023-04-14 RX ORDER — DEXTROAMPHETAMINE SACCHARATE, AMPHETAMINE ASPARTATE MONOHYDRATE, DEXTROAMPHETAMINE SULFATE AND AMPHETAMINE SULFATE 7.5; 7.5; 7.5; 7.5 MG/1; MG/1; MG/1; MG/1
30 CAPSULE, EXTENDED RELEASE ORAL EVERY MORNING
Qty: 30 CAPSULE | Refills: 0 | Status: SHIPPED | OUTPATIENT
Start: 2023-06-08 | End: 2023-07-28 | Stop reason: SDUPTHER

## 2023-04-14 RX ORDER — DEXTROAMPHETAMINE SACCHARATE, AMPHETAMINE ASPARTATE MONOHYDRATE, DEXTROAMPHETAMINE SULFATE AND AMPHETAMINE SULFATE 7.5; 7.5; 7.5; 7.5 MG/1; MG/1; MG/1; MG/1
30 CAPSULE, EXTENDED RELEASE ORAL EVERY MORNING
Qty: 30 CAPSULE | Refills: 0 | Status: SHIPPED | OUTPATIENT
Start: 2023-04-14 | End: 2023-04-19 | Stop reason: SDUPTHER

## 2023-04-14 RX ORDER — DEXTROAMPHETAMINE SACCHARATE, AMPHETAMINE ASPARTATE MONOHYDRATE, DEXTROAMPHETAMINE SULFATE AND AMPHETAMINE SULFATE 7.5; 7.5; 7.5; 7.5 MG/1; MG/1; MG/1; MG/1
30 CAPSULE, EXTENDED RELEASE ORAL EVERY MORNING
Qty: 30 CAPSULE | Refills: 0 | Status: SHIPPED | OUTPATIENT
Start: 2023-05-10 | End: 2023-07-28 | Stop reason: SDUPTHER

## 2023-04-19 NOTE — TELEPHONE ENCOUNTER
No new care gaps identified.  [facility] Embedded Care Gaps. Reference number: [id number]. 4/19/2023   10:29:43 AM MAXIMINOT

## 2023-04-20 RX ORDER — DEXTROAMPHETAMINE SACCHARATE, AMPHETAMINE ASPARTATE MONOHYDRATE, DEXTROAMPHETAMINE SULFATE AND AMPHETAMINE SULFATE 7.5; 7.5; 7.5; 7.5 MG/1; MG/1; MG/1; MG/1
30 CAPSULE, EXTENDED RELEASE ORAL EVERY MORNING
Qty: 30 CAPSULE | Refills: 0 | Status: SHIPPED | OUTPATIENT
Start: 2023-04-20 | End: 2023-05-17 | Stop reason: SDUPTHER

## 2023-04-20 NOTE — TELEPHONE ENCOUNTER
----- Message from Ligia Phelps sent at 4/20/2023  2:11 PM CDT -----  Contact: SELF/[phone]  Pt called in regards to letting the office know that she is going to go to the pharmacy and pick it up. Call back    Please advice

## 2023-04-20 NOTE — PROGRESS NOTES
There were no vitals taken for this visit.      ===========    Chief Complaint: No chief complaint on file.          JOSE ELIAS Queen is a 32 y.o. female     here for    The patient location is:  [place]  The chief complaint leading to consultation is:  ADD med follow-up    Visit type:  Audiovisual    Face to Face time with patient:  5 minutes  Eight minutes of total time spent on the encounter, which includes face to face time and non-face to face time preparing to see the patient (eg, review of tests), Obtaining and/or reviewing separately obtained history, Documenting clinical information in the electronic or other health record, Independently interpreting results (not separately reported) and communicating results to the patient/family/caregiver, or Care coordination (not separately reported).         Each patient to whom he or she provides medical services by telemedicine is:  (1) informed of the relationship between the physician and patient and the respective role of any other health care provider with respect to management of the patient; and (2) notified that he or she may decline to receive medical services by telemedicine and may withdraw from such care at any time.    Notes:   32-year-old female here for follow-up of ADD medication.  Doing well with medications.  Takes Adderall XR 30 mg daily.  Refill.  Three-month follow-up      Patient queried and denies any further complaints    Patient has MEDICAL HISTORY OF  Patient Active Problem List   Diagnosis    Attention deficit hyperactivity disorder (ADHD), predominantly inattentive type    Class 3 severe obesity due to excess calories without serious comorbidity with body mass index (BMI) of 40.0 to 44.9 in adult       SURGICAL AND MEDICAL HISTORY: updated and reviewed.  No past surgical history on file.  ALLERGIES updated and reviewed.  Review of patient's allergies indicates:  No Known Allergies    CURRENT OUTPATIENT MEDICATIONS updated and  reviewed    Current Outpatient Medications:     [START ON 6/8/2023] dextroamphetamine-amphetamine (ADDERALL XR) 30 MG 24 hr capsule, Take 1 capsule (30 mg total) by mouth every morning., Disp: 30 capsule, Rfl: 0    [START ON 5/10/2023] dextroamphetamine-amphetamine (ADDERALL XR) 30 MG 24 hr capsule, Take 1 capsule (30 mg total) by mouth every morning., Disp: 30 capsule, Rfl: 0    dextroamphetamine-amphetamine (ADDERALL XR) 30 MG 24 hr capsule, Take 1 capsule (30 mg total) by mouth every morning., Disp: 30 capsule, Rfl: 0    Review of Systems   Constitutional:  Negative for activity change, appetite change, chills, diaphoresis, fatigue, fever and unexpected weight change.   HENT:  Negative for congestion, ear discharge, ear pain, facial swelling, hearing loss, nosebleeds, postnasal drip, rhinorrhea, sinus pressure, sneezing, sore throat, tinnitus, trouble swallowing and voice change.    Eyes:  Negative for photophobia, pain, discharge, redness, itching and visual disturbance.   Respiratory:  Negative for cough, chest tightness, shortness of breath and wheezing.    Cardiovascular:  Negative for chest pain, palpitations and leg swelling.   Gastrointestinal:  Negative for abdominal distention, abdominal pain, anal bleeding, blood in stool, constipation, diarrhea, nausea, rectal pain and vomiting.   Endocrine: Negative for cold intolerance, heat intolerance, polydipsia, polyphagia and polyuria.   Genitourinary:  Negative for difficulty urinating, dysuria, flank pain and hematuria.   Musculoskeletal:  Negative for arthralgias, back pain, joint swelling, myalgias and neck pain.   Skin:  Negative for rash.   Neurological:  Negative for dizziness, tremors, seizures, syncope, speech difficulty, weakness, light-headedness, numbness and headaches.   Psychiatric/Behavioral:  Negative for behavioral problems, confusion, decreased concentration, dysphoric mood, sleep disturbance and suicidal ideas. The patient is not  nervous/anxious and is not hyperactive.      There were no vitals taken for this visit.  Physical Exam  Constitutional:       General: She is not in acute distress.     Appearance: Normal appearance. She is not ill-appearing.   Neurological:      Mental Status: She is alert.   Psychiatric:         Mood and Affect: Mood normal.         Behavior: Behavior normal.       ASSESSMENT/PLAN  Diagnoses and all orders for this visit:    Attention deficit hyperactivity disorder (ADHD), predominantly inattentive type  -     dextroamphetamine-amphetamine (ADDERALL XR) 30 MG 24 hr capsule; Take 1 capsule (30 mg total) by mouth every morning.    Other orders  -     dextroamphetamine-amphetamine (ADDERALL XR) 30 MG 24 hr capsule; Take 1 capsule (30 mg total) by mouth every morning.  -     dextroamphetamine-amphetamine (ADDERALL XR) 30 MG 24 hr capsule; Take 1 capsule (30 mg total) by mouth every morning.            All lab results over past 2 years available reviewed inc labs and any cardiology or radiology studies.  Any new prescription medications gone over in detail including reason for taking the medication, the general mechanism of action, most common possible side effects and possible costs, etcetera.    Chronic conditions updated. Other than changes or additions as above, cont current medications and maintain follow-up with specialists if indicated.     Jon Mckay MD  A dictation device was used to produce this document. Use of such devices sometimes results in grammatical errors or replacement of words that sound similarly.

## 2023-05-17 NOTE — TELEPHONE ENCOUNTER
No care due was identified.  [facility] Embedded Care Due Messages. Reference number: [id number].   5/17/2023 8:54:39 AM CDT

## 2023-05-19 RX ORDER — DEXTROAMPHETAMINE SACCHARATE, AMPHETAMINE ASPARTATE MONOHYDRATE, DEXTROAMPHETAMINE SULFATE AND AMPHETAMINE SULFATE 7.5; 7.5; 7.5; 7.5 MG/1; MG/1; MG/1; MG/1
30 CAPSULE, EXTENDED RELEASE ORAL EVERY MORNING
Qty: 30 CAPSULE | Refills: 0 | Status: SHIPPED | OUTPATIENT
Start: 2023-05-19 | End: 2023-06-19 | Stop reason: SDUPTHER

## 2023-06-19 NOTE — TELEPHONE ENCOUNTER
No care due was identified.  [facility] Embedded Care Due Messages. Reference number: [id number].   6/19/2023 5:01:25 PM CDT

## 2023-06-20 RX ORDER — DEXTROAMPHETAMINE SACCHARATE, AMPHETAMINE ASPARTATE MONOHYDRATE, DEXTROAMPHETAMINE SULFATE AND AMPHETAMINE SULFATE 7.5; 7.5; 7.5; 7.5 MG/1; MG/1; MG/1; MG/1
30 CAPSULE, EXTENDED RELEASE ORAL EVERY MORNING
Qty: 30 CAPSULE | Refills: 0 | Status: SHIPPED | OUTPATIENT
Start: 2023-06-20 | End: 2023-07-28 | Stop reason: SDUPTHER

## 2023-07-24 RX ORDER — DEXTROAMPHETAMINE SACCHARATE, AMPHETAMINE ASPARTATE MONOHYDRATE, DEXTROAMPHETAMINE SULFATE AND AMPHETAMINE SULFATE 7.5; 7.5; 7.5; 7.5 MG/1; MG/1; MG/1; MG/1
30 CAPSULE, EXTENDED RELEASE ORAL EVERY MORNING
Qty: 30 CAPSULE | Refills: 0 | OUTPATIENT
Start: 2023-07-24

## 2023-07-24 NOTE — TELEPHONE ENCOUNTER
No care due was identified.  [facility] Embedded Care Due Messages. Reference number: [id number].   7/24/2023 2:38:05 PM CDT

## 2023-07-28 ENCOUNTER — OFFICE VISIT (OUTPATIENT)
Dept: PRIMARY CARE CLINIC | Facility: CLINIC | Age: 33
End: 2023-07-28
Payer: COMMERCIAL

## 2023-07-28 DIAGNOSIS — F90.0 ATTENTION DEFICIT HYPERACTIVITY DISORDER (ADHD), PREDOMINANTLY INATTENTIVE TYPE: ICD-10-CM

## 2023-07-28 PROCEDURE — 1159F MED LIST DOCD IN RCRD: CPT | Mod: CPTII,95,, | Performed by: FAMILY MEDICINE

## 2023-07-28 PROCEDURE — 1159F PR MEDICATION LIST DOCUMENTED IN MEDICAL RECORD: ICD-10-PCS | Mod: CPTII,95,, | Performed by: FAMILY MEDICINE

## 2023-07-28 PROCEDURE — 1160F RVW MEDS BY RX/DR IN RCRD: CPT | Mod: CPTII,95,, | Performed by: FAMILY MEDICINE

## 2023-07-28 PROCEDURE — 3044F HG A1C LEVEL LT 7.0%: CPT | Mod: CPTII,95,, | Performed by: FAMILY MEDICINE

## 2023-07-28 PROCEDURE — 99213 PR OFFICE/OUTPT VISIT, EST, LEVL III, 20-29 MIN: ICD-10-PCS | Mod: 95,,, | Performed by: FAMILY MEDICINE

## 2023-07-28 PROCEDURE — 99213 OFFICE O/P EST LOW 20 MIN: CPT | Mod: 95,,, | Performed by: FAMILY MEDICINE

## 2023-07-28 PROCEDURE — 3044F PR MOST RECENT HEMOGLOBIN A1C LEVEL <7.0%: ICD-10-PCS | Mod: CPTII,95,, | Performed by: FAMILY MEDICINE

## 2023-07-28 PROCEDURE — 1160F PR REVIEW ALL MEDS BY PRESCRIBER/CLIN PHARMACIST DOCUMENTED: ICD-10-PCS | Mod: CPTII,95,, | Performed by: FAMILY MEDICINE

## 2023-07-28 RX ORDER — DEXTROAMPHETAMINE SACCHARATE, AMPHETAMINE ASPARTATE MONOHYDRATE, DEXTROAMPHETAMINE SULFATE AND AMPHETAMINE SULFATE 7.5; 7.5; 7.5; 7.5 MG/1; MG/1; MG/1; MG/1
30 CAPSULE, EXTENDED RELEASE ORAL EVERY MORNING
Qty: 30 CAPSULE | Refills: 0 | Status: SHIPPED | OUTPATIENT
Start: 2023-09-20 | End: 2023-11-03 | Stop reason: SDUPTHER

## 2023-07-28 RX ORDER — DEXTROAMPHETAMINE SACCHARATE, AMPHETAMINE ASPARTATE MONOHYDRATE, DEXTROAMPHETAMINE SULFATE AND AMPHETAMINE SULFATE 7.5; 7.5; 7.5; 7.5 MG/1; MG/1; MG/1; MG/1
30 CAPSULE, EXTENDED RELEASE ORAL EVERY MORNING
Qty: 30 CAPSULE | Refills: 0 | Status: SHIPPED | OUTPATIENT
Start: 2023-08-24 | End: 2023-11-03 | Stop reason: SDUPTHER

## 2023-07-28 RX ORDER — DEXTROAMPHETAMINE SACCHARATE, AMPHETAMINE ASPARTATE MONOHYDRATE, DEXTROAMPHETAMINE SULFATE AND AMPHETAMINE SULFATE 7.5; 7.5; 7.5; 7.5 MG/1; MG/1; MG/1; MG/1
30 CAPSULE, EXTENDED RELEASE ORAL EVERY MORNING
Qty: 30 CAPSULE | Refills: 0 | Status: SHIPPED | OUTPATIENT
Start: 2023-07-28 | End: 2023-08-30 | Stop reason: SDUPTHER

## 2023-07-31 NOTE — PROGRESS NOTES
The patient location is:  [place]  The chief complaint leading to consultation is:  ADD    Visit type:  Audiovisual    Face to Face time with patient:  4 minutes  Six minutes of total time spent on the encounter, which includes face to face time and non-face to face time preparing to see the patient (eg, review of tests), Obtaining and/or reviewing separately obtained history, Documenting clinical information in the electronic or other health record, Independently interpreting results (not separately reported) and communicating results to the patient/family/caregiver, or Care coordination (not separately reported).         Each patient to whom he or she provides medical services by telemedicine is:  (1) informed of the relationship between the physician and patient and the respective role of any other health care provider with respect to management of the patient; and (2) notified that he or she may decline to receive medical services by telemedicine and may withdraw from such care at any time.    Notes:   32-year-old female here for follow-up of ADD medication.  Doing well.  No problems.  Refill.  Follow-up in 3 months.  Answers submitted by the patient for this visit:  Review of Systems Questionnaire (Submitted on 7/28/2023)  activity change: No  unexpected weight change: No  neck pain: No  hearing loss: No  rhinorrhea: No  trouble swallowing: No  eye discharge: No  visual disturbance: No  chest tightness: No  wheezing: No  chest pain: No  palpitations: No  blood in stool: No  constipation: No  vomiting: No  diarrhea: No  polydipsia: No  polyuria: No  difficulty urinating: No  hematuria: No  menstrual problem: No  dysuria: No  joint swelling: No  arthralgias: No  headaches: No  weakness: No  confusion: No  dysphoric mood: No

## 2023-08-30 RX ORDER — DEXTROAMPHETAMINE SACCHARATE, AMPHETAMINE ASPARTATE MONOHYDRATE, DEXTROAMPHETAMINE SULFATE AND AMPHETAMINE SULFATE 7.5; 7.5; 7.5; 7.5 MG/1; MG/1; MG/1; MG/1
30 CAPSULE, EXTENDED RELEASE ORAL EVERY MORNING
Qty: 30 CAPSULE | Refills: 0 | Status: SHIPPED | OUTPATIENT
Start: 2023-08-30 | End: 2023-10-08 | Stop reason: SDUPTHER

## 2023-08-30 NOTE — TELEPHONE ENCOUNTER
No care due was identified.  [facility] Embedded Care Due Messages. Reference number: [id number].   8/30/2023 8:31:22 AM CDT

## 2023-09-25 ENCOUNTER — OFFICE VISIT (OUTPATIENT)
Dept: URGENT CARE | Facility: CLINIC | Age: 33
End: 2023-09-25
Payer: COMMERCIAL

## 2023-09-25 VITALS
HEART RATE: 97 BPM | RESPIRATION RATE: 18 BRPM | SYSTOLIC BLOOD PRESSURE: 147 MMHG | OXYGEN SATURATION: 99 % | WEIGHT: 244 LBS | BODY MASS INDEX: 43.23 KG/M2 | DIASTOLIC BLOOD PRESSURE: 99 MMHG | HEIGHT: 63 IN | TEMPERATURE: 99 F

## 2023-09-25 DIAGNOSIS — J20.9 ACUTE BRONCHITIS, UNSPECIFIED ORGANISM: Primary | ICD-10-CM

## 2023-09-25 LAB
CTP QC/QA: YES
CTP QC/QA: YES
POC MOLECULAR INFLUENZA A AGN: NEGATIVE
POC MOLECULAR INFLUENZA B AGN: NEGATIVE
SARS-COV-2 AG RESP QL IA.RAPID: NEGATIVE

## 2023-09-25 PROCEDURE — 99213 PR OFFICE/OUTPT VISIT, EST, LEVL III, 20-29 MIN: ICD-10-PCS | Mod: S$GLB,,, | Performed by: FAMILY MEDICINE

## 2023-09-25 PROCEDURE — 87502 POCT INFLUENZA A/B MOLECULAR: ICD-10-PCS | Mod: QW,S$GLB,, | Performed by: FAMILY MEDICINE

## 2023-09-25 PROCEDURE — 87811 SARS-COV-2 COVID19 W/OPTIC: CPT | Mod: QW,S$GLB,, | Performed by: FAMILY MEDICINE

## 2023-09-25 PROCEDURE — 99213 OFFICE O/P EST LOW 20 MIN: CPT | Mod: S$GLB,,, | Performed by: FAMILY MEDICINE

## 2023-09-25 PROCEDURE — 87811 SARS CORONAVIRUS 2 ANTIGEN POCT, MANUAL READ: ICD-10-PCS | Mod: QW,S$GLB,, | Performed by: FAMILY MEDICINE

## 2023-09-25 PROCEDURE — 87502 INFLUENZA DNA AMP PROBE: CPT | Mod: QW,S$GLB,, | Performed by: FAMILY MEDICINE

## 2023-09-25 RX ORDER — AZITHROMYCIN 250 MG/1
TABLET, FILM COATED ORAL
Qty: 6 TABLET | Refills: 0 | Status: SHIPPED | OUTPATIENT
Start: 2023-09-25 | End: 2023-09-25 | Stop reason: SDUPTHER

## 2023-09-25 RX ORDER — AZITHROMYCIN 250 MG/1
TABLET, FILM COATED ORAL
Qty: 6 TABLET | Refills: 0 | Status: SHIPPED | OUTPATIENT
Start: 2023-09-25 | End: 2023-09-30

## 2023-09-25 RX ORDER — PROMETHAZINE HYDROCHLORIDE AND DEXTROMETHORPHAN HYDROBROMIDE 6.25; 15 MG/5ML; MG/5ML
5 SYRUP ORAL NIGHTLY PRN
Qty: 118 ML | Refills: 0 | Status: SHIPPED | OUTPATIENT
Start: 2023-09-25 | End: 2023-09-25 | Stop reason: SDUPTHER

## 2023-09-25 RX ORDER — ALBUTEROL SULFATE 90 UG/1
2 AEROSOL, METERED RESPIRATORY (INHALATION) EVERY 6 HOURS PRN
Qty: 18 G | Refills: 0 | Status: SHIPPED | OUTPATIENT
Start: 2023-09-25 | End: 2023-11-03

## 2023-09-25 RX ORDER — BENZONATATE 100 MG/1
100 CAPSULE ORAL EVERY 6 HOURS PRN
Qty: 30 CAPSULE | Refills: 1 | Status: SHIPPED | OUTPATIENT
Start: 2023-09-25 | End: 2023-09-25 | Stop reason: SDUPTHER

## 2023-09-25 RX ORDER — ALBUTEROL SULFATE 90 UG/1
2 AEROSOL, METERED RESPIRATORY (INHALATION) EVERY 6 HOURS PRN
Qty: 18 G | Refills: 0 | Status: SHIPPED | OUTPATIENT
Start: 2023-09-25 | End: 2023-09-25 | Stop reason: SDUPTHER

## 2023-09-25 RX ORDER — PROMETHAZINE HYDROCHLORIDE AND DEXTROMETHORPHAN HYDROBROMIDE 6.25; 15 MG/5ML; MG/5ML
5 SYRUP ORAL NIGHTLY PRN
Qty: 118 ML | Refills: 0 | Status: SHIPPED | OUTPATIENT
Start: 2023-09-25 | End: 2023-10-05

## 2023-09-25 RX ORDER — BENZONATATE 100 MG/1
100 CAPSULE ORAL EVERY 6 HOURS PRN
Qty: 30 CAPSULE | Refills: 1 | Status: SHIPPED | OUTPATIENT
Start: 2023-09-25 | End: 2023-11-03

## 2023-09-25 NOTE — PROGRESS NOTES
"Subjective:      Patient ID: Bozena Queen is a 32 y.o. female.    Vitals:  height is 5' 3" (1.6 m) and weight is 110.7 kg (244 lb). Her oral temperature is 98.9 °F (37.2 °C). Her blood pressure is 147/99 (abnormal) and her pulse is 97. Her respiration is 18 and oxygen saturation is 99%.     Chief Complaint: Nasal Congestion    This is a 32 y.o. female who presents today with a chief complaint of productive cough, nasal congestion, sore throat . Pt also presents with r ear px, r ear congestion. Patient stated no fever. Nausea, vomiting or abd pain.    Home tx: Mucinex    PMH: Bronchitis     Cough  This is a new problem. The current episode started in the past 7 days. The problem has been gradually worsening. The problem occurs constantly. The cough is Productive of sputum. Associated symptoms include chest pain, chills, ear congestion, ear pain, nasal congestion, a sore throat and wheezing. Pertinent negatives include no fever, headaches or sweats.       Constitution: Positive for chills. Negative for fever.   HENT:  Positive for ear pain and sore throat.    Cardiovascular:  Positive for chest pain.   Respiratory:  Positive for cough and wheezing.    Neurological:  Negative for headaches.      Objective:     Physical Exam   Constitutional: She does not appear ill. No distress. obesity  HENT:   Head: Normocephalic and atraumatic.   Cardiovascular: Normal rate, regular rhythm, normal heart sounds and normal pulses.   Pulmonary/Chest: Effort normal and breath sounds normal. She has. Wheezes: left upper lung field.  Abdominal: Normal appearance and bowel sounds are normal. Soft.   Neurological: She is alert.   Nursing note and vitals reviewed.    Results for orders placed or performed in visit on 09/25/23   SARS Coronavirus 2 Antigen, POCT Manual Read   Result Value Ref Range    SARS Coronavirus 2 Antigen Negative Negative     Acceptable Yes    POCT Influenza A/B Molecular   Result Value Ref Range    POC " Molecular Influenza A Ag Negative Negative, Not Reported    POC Molecular Influenza B Ag Negative Negative, Not Reported     Acceptable Yes         Assessment:     1. Acute bronchitis, unspecified organism        Plan:       Acute bronchitis, unspecified organism  -     SARS Coronavirus 2 Antigen, POCT Manual Read  -     Cancel: X-Ray Chest PA And Lateral  -     POCT Influenza A/B Molecular  -     benzonatate (TESSALON PERLES) 100 MG capsule; Take 1 capsule (100 mg total) by mouth every 6 (six) hours as needed for Cough.  Dispense: 30 capsule; Refill: 1  -     promethazine-dextromethorphan (PROMETHAZINE-DM) 6.25-15 mg/5 mL Syrp; Take 5 mLs by mouth nightly as needed (cough).  Dispense: 118 mL; Refill: 0  -     albuterol (PROAIR HFA) 90 mcg/actuation inhaler; Inhale 2 puffs into the lungs every 6 (six) hours as needed for Wheezing. Rescue  Dispense: 18 g; Refill: 0  -     azithromycin (ZITHROMAX) 250 MG tablet; Take 2 tablets (500 mg) on  Day 1,  followed by 1 tablet (250 mg) once daily on Days 2 through 5.  Dispense: 6 tablet; Refill: 0    Patient declined CXR.

## 2023-10-09 RX ORDER — DEXTROAMPHETAMINE SACCHARATE, AMPHETAMINE ASPARTATE MONOHYDRATE, DEXTROAMPHETAMINE SULFATE AND AMPHETAMINE SULFATE 7.5; 7.5; 7.5; 7.5 MG/1; MG/1; MG/1; MG/1
30 CAPSULE, EXTENDED RELEASE ORAL EVERY MORNING
Qty: 30 CAPSULE | Refills: 0 | Status: SHIPPED | OUTPATIENT
Start: 2023-10-09 | End: 2023-11-03 | Stop reason: SDUPTHER

## 2023-10-09 NOTE — TELEPHONE ENCOUNTER
No care due was identified.  [facility] Embedded Care Due Messages. Reference number: [id number].   10/08/2023 7:22:39 PM CDT

## 2023-10-16 ENCOUNTER — OFFICE VISIT (OUTPATIENT)
Dept: OBSTETRICS AND GYNECOLOGY | Facility: CLINIC | Age: 33
End: 2023-10-16
Payer: COMMERCIAL

## 2023-10-16 VITALS
DIASTOLIC BLOOD PRESSURE: 78 MMHG | SYSTOLIC BLOOD PRESSURE: 124 MMHG | WEIGHT: 245.38 LBS | HEIGHT: 63 IN | BODY MASS INDEX: 43.48 KG/M2

## 2023-10-16 DIAGNOSIS — Z01.419 ENCOUNTER FOR WELL WOMAN EXAM WITH ROUTINE GYNECOLOGICAL EXAM: Primary | ICD-10-CM

## 2023-10-16 PROCEDURE — 3074F SYST BP LT 130 MM HG: CPT | Mod: CPTII,S$GLB,, | Performed by: OBSTETRICS & GYNECOLOGY

## 2023-10-16 PROCEDURE — 99385 PR PREVENTIVE VISIT,NEW,18-39: ICD-10-PCS | Mod: S$GLB,,, | Performed by: OBSTETRICS & GYNECOLOGY

## 2023-10-16 PROCEDURE — 87624 HPV HI-RISK TYP POOLED RSLT: CPT | Performed by: OBSTETRICS & GYNECOLOGY

## 2023-10-16 PROCEDURE — 3008F PR BODY MASS INDEX (BMI) DOCUMENTED: ICD-10-PCS | Mod: CPTII,S$GLB,, | Performed by: OBSTETRICS & GYNECOLOGY

## 2023-10-16 PROCEDURE — 99999 PR PBB SHADOW E&M-EST. PATIENT-LVL III: ICD-10-PCS | Mod: PBBFAC,,, | Performed by: OBSTETRICS & GYNECOLOGY

## 2023-10-16 PROCEDURE — 3044F HG A1C LEVEL LT 7.0%: CPT | Mod: CPTII,S$GLB,, | Performed by: OBSTETRICS & GYNECOLOGY

## 2023-10-16 PROCEDURE — 3078F PR MOST RECENT DIASTOLIC BLOOD PRESSURE < 80 MM HG: ICD-10-PCS | Mod: CPTII,S$GLB,, | Performed by: OBSTETRICS & GYNECOLOGY

## 2023-10-16 PROCEDURE — 3078F DIAST BP <80 MM HG: CPT | Mod: CPTII,S$GLB,, | Performed by: OBSTETRICS & GYNECOLOGY

## 2023-10-16 PROCEDURE — 1160F RVW MEDS BY RX/DR IN RCRD: CPT | Mod: CPTII,S$GLB,, | Performed by: OBSTETRICS & GYNECOLOGY

## 2023-10-16 PROCEDURE — 3044F PR MOST RECENT HEMOGLOBIN A1C LEVEL <7.0%: ICD-10-PCS | Mod: CPTII,S$GLB,, | Performed by: OBSTETRICS & GYNECOLOGY

## 2023-10-16 PROCEDURE — 1160F PR REVIEW ALL MEDS BY PRESCRIBER/CLIN PHARMACIST DOCUMENTED: ICD-10-PCS | Mod: CPTII,S$GLB,, | Performed by: OBSTETRICS & GYNECOLOGY

## 2023-10-16 PROCEDURE — 3074F PR MOST RECENT SYSTOLIC BLOOD PRESSURE < 130 MM HG: ICD-10-PCS | Mod: CPTII,S$GLB,, | Performed by: OBSTETRICS & GYNECOLOGY

## 2023-10-16 PROCEDURE — 99385 PREV VISIT NEW AGE 18-39: CPT | Mod: S$GLB,,, | Performed by: OBSTETRICS & GYNECOLOGY

## 2023-10-16 PROCEDURE — 1159F PR MEDICATION LIST DOCUMENTED IN MEDICAL RECORD: ICD-10-PCS | Mod: CPTII,S$GLB,, | Performed by: OBSTETRICS & GYNECOLOGY

## 2023-10-16 PROCEDURE — 3008F BODY MASS INDEX DOCD: CPT | Mod: CPTII,S$GLB,, | Performed by: OBSTETRICS & GYNECOLOGY

## 2023-10-16 PROCEDURE — 99999 PR PBB SHADOW E&M-EST. PATIENT-LVL III: CPT | Mod: PBBFAC,,, | Performed by: OBSTETRICS & GYNECOLOGY

## 2023-10-16 PROCEDURE — 88175 CYTOPATH C/V AUTO FLUID REDO: CPT | Performed by: OBSTETRICS & GYNECOLOGY

## 2023-10-16 PROCEDURE — 1159F MED LIST DOCD IN RCRD: CPT | Mod: CPTII,S$GLB,, | Performed by: OBSTETRICS & GYNECOLOGY

## 2023-10-16 NOTE — PROGRESS NOTES
History & Physical  Gynecology      SUBJECTIVE:     Chief Complaint: Gynecologic Exam       History of Present Illness:  Bozena Queen is a 33 y.o. female G0 (potentially chemical pregnancies in the past) here for annual routine Pap and checkup. Patient's last menstrual period was 09/21/2023..  She has no unusual complaints.      She describes her periods as regular, lasting 3-4 days. normal flow.  denies break through bleeding.   denies vaginal itching or irritation.  denies vaginal discharge.    She is sexually active with 1 partner  She uses condoms for contraception.    History of abnormal pap: No  Last Pap: N/A  Last MMG: No  Last Colonoscopy:  No        Review of patient's allergies indicates:  No Known Allergies    Past Medical History:   Diagnosis Date    Obesity (BMI 30-39.9) 7/26/2019    RUQ abdominal pain 7/26/2019     History reviewed. No pertinent surgical history.  OB History    No obstetric history on file.       Family History   Problem Relation Age of Onset    Hypertension Mother     No Known Problems Sister     No Known Problems Brother      Social History     Tobacco Use    Smoking status: Never     Passive exposure: Never    Smokeless tobacco: Never   Substance Use Topics    Alcohol use: Yes     Alcohol/week: 2.0 standard drinks of alcohol     Types: 2 Glasses of wine per week    Drug use: No       Current Outpatient Medications   Medication Sig    dextroamphetamine-amphetamine (ADDERALL XR) 30 MG 24 hr capsule Take 1 capsule (30 mg total) by mouth every morning.    dextroamphetamine-amphetamine (ADDERALL XR) 30 MG 24 hr capsule Take 1 capsule (30 mg total) by mouth every morning.    dextroamphetamine-amphetamine (ADDERALL XR) 30 MG 24 hr capsule Take 1 capsule (30 mg total) by mouth every morning.    albuterol (PROAIR HFA) 90 mcg/actuation inhaler Inhale 2 puffs into the lungs every 6 (six) hours as needed for Wheezing. Rescue (Patient not taking: Reported on 10/16/2023)    benzonatate (TESSALON  PERLES) 100 MG capsule Take 1 capsule (100 mg total) by mouth every 6 (six) hours as needed for Cough. (Patient not taking: Reported on 10/16/2023)     No current facility-administered medications for this visit.         Review of Systems:  Review of Systems   Constitutional:  Negative for activity change, appetite change, chills, fatigue, fever and unexpected weight change.   Respiratory:  Negative for cough, shortness of breath and wheezing.    Cardiovascular:  Negative for chest pain and leg swelling.   Gastrointestinal:  Negative for abdominal pain, constipation, diarrhea, nausea and vomiting.   Endocrine: Negative for hair loss and hot flashes.   Genitourinary:  Negative for decreased libido, dyspareunia, dysuria, frequency, menstrual problem, pelvic pain, vaginal bleeding, vaginal discharge and vaginal pain.   Integumentary:  Negative for acne, hair changes, nipple discharge and breast skin changes.   Neurological:  Negative for headaches.   Psychiatric/Behavioral:  Negative for sleep disturbance.    Breast: Negative for mastodynia, nipple discharge and skin changes       OBJECTIVE:     Physical Exam:  Physical Exam  Constitutional:       Appearance: She is well-developed.   HENT:      Head: Normocephalic and atraumatic.   Eyes:      General: No scleral icterus.        Right eye: No discharge.         Left eye: No discharge.      Conjunctiva/sclera: Conjunctivae normal.   Pulmonary:      Effort: Pulmonary effort is normal.      Breath sounds: No stridor.   Chest:      Chest wall: No mass or tenderness.   Breasts:     Breasts are symmetrical.      Right: No inverted nipple, mass, nipple discharge, skin change or tenderness.      Left: No inverted nipple, mass, nipple discharge, skin change or tenderness.   Abdominal:      General: There is no distension.      Palpations: Abdomen is soft.      Tenderness: There is no abdominal tenderness.   Genitourinary:     Labia:         Right: No rash, tenderness, lesion or  injury.         Left: No rash, tenderness, lesion or injury.       Vagina: Normal.      Cervix: No cervical motion tenderness, discharge or friability.      Adnexa:         Right: No mass, tenderness or fullness.          Left: No mass, tenderness or fullness.        Comments: Normal external genitalia.  Normal hair distribution.  Urethral meatus normal. No cervical lesions or masses.  No vaginal bleeding noted.  No adnexal or uterine tenderness.  No palpable adnexal masses.  Musculoskeletal:         General: Normal range of motion.   Skin:     General: Skin is warm and dry.   Neurological:      Mental Status: She is alert and oriented to person, place, and time.   Psychiatric:         Behavior: Behavior normal.         Thought Content: Thought content normal.         Judgment: Judgment normal.           ASSESSMENT:       ICD-10-CM ICD-9-CM    1. Encounter for well woman exam with routine gynecological exam  Z01.419 V72.31 Liquid-Based Pap Smear, Screening      HPV High Risk Genotypes, PCR             Plan:      Bozena was seen today for gynecologic exam.    Diagnoses and all orders for this visit:    Encounter for well woman exam with routine gynecological exam  -     Liquid-Based Pap Smear, Screening  -     HPV High Risk Genotypes, PCR  - cotesting today  - MMG and Cscope not indicated at this time  - Discussed medications with patient if she decides to get pregnant      Orders Placed This Encounter   Procedures    HPV High Risk Genotypes, PCR       No follow-ups on file.     Counseling time: 15 minutes    Rossy Miles

## 2023-10-19 LAB
FINAL PATHOLOGIC DIAGNOSIS: NORMAL
HPV HR 12 DNA SPEC QL NAA+PROBE: NEGATIVE
HPV16 AG SPEC QL: NEGATIVE
HPV18 DNA SPEC QL NAA+PROBE: NEGATIVE
Lab: NORMAL

## 2023-11-02 ENCOUNTER — PATIENT MESSAGE (OUTPATIENT)
Dept: PRIMARY CARE CLINIC | Facility: CLINIC | Age: 33
End: 2023-11-02
Payer: COMMERCIAL

## 2023-11-03 ENCOUNTER — OFFICE VISIT (OUTPATIENT)
Dept: PRIMARY CARE CLINIC | Facility: CLINIC | Age: 33
End: 2023-11-03
Payer: COMMERCIAL

## 2023-11-03 DIAGNOSIS — F90.0 ATTENTION DEFICIT HYPERACTIVITY DISORDER (ADHD), PREDOMINANTLY INATTENTIVE TYPE: ICD-10-CM

## 2023-11-03 PROCEDURE — 99213 PR OFFICE/OUTPT VISIT, EST, LEVL III, 20-29 MIN: ICD-10-PCS | Mod: 95,,, | Performed by: FAMILY MEDICINE

## 2023-11-03 PROCEDURE — 1159F MED LIST DOCD IN RCRD: CPT | Mod: CPTII,95,, | Performed by: FAMILY MEDICINE

## 2023-11-03 PROCEDURE — 3044F HG A1C LEVEL LT 7.0%: CPT | Mod: CPTII,95,, | Performed by: FAMILY MEDICINE

## 2023-11-03 PROCEDURE — 99213 OFFICE O/P EST LOW 20 MIN: CPT | Mod: 95,,, | Performed by: FAMILY MEDICINE

## 2023-11-03 PROCEDURE — 3044F PR MOST RECENT HEMOGLOBIN A1C LEVEL <7.0%: ICD-10-PCS | Mod: CPTII,95,, | Performed by: FAMILY MEDICINE

## 2023-11-03 PROCEDURE — 1159F PR MEDICATION LIST DOCUMENTED IN MEDICAL RECORD: ICD-10-PCS | Mod: CPTII,95,, | Performed by: FAMILY MEDICINE

## 2023-11-03 PROCEDURE — 1160F PR REVIEW ALL MEDS BY PRESCRIBER/CLIN PHARMACIST DOCUMENTED: ICD-10-PCS | Mod: CPTII,95,, | Performed by: FAMILY MEDICINE

## 2023-11-03 PROCEDURE — 1160F RVW MEDS BY RX/DR IN RCRD: CPT | Mod: CPTII,95,, | Performed by: FAMILY MEDICINE

## 2023-11-03 RX ORDER — DEXTROAMPHETAMINE SACCHARATE, AMPHETAMINE ASPARTATE MONOHYDRATE, DEXTROAMPHETAMINE SULFATE AND AMPHETAMINE SULFATE 5; 5; 5; 5 MG/1; MG/1; MG/1; MG/1
20 CAPSULE, EXTENDED RELEASE ORAL EVERY MORNING
Qty: 30 CAPSULE | Refills: 0 | Status: SHIPPED | OUTPATIENT
Start: 2023-11-03

## 2023-11-03 RX ORDER — DEXTROAMPHETAMINE SACCHARATE, AMPHETAMINE ASPARTATE MONOHYDRATE, DEXTROAMPHETAMINE SULFATE AND AMPHETAMINE SULFATE 5; 5; 5; 5 MG/1; MG/1; MG/1; MG/1
20 CAPSULE, EXTENDED RELEASE ORAL EVERY MORNING
Qty: 30 CAPSULE | Refills: 0 | Status: SHIPPED | OUTPATIENT
Start: 2023-11-30

## 2023-11-03 RX ORDER — DEXTROAMPHETAMINE SACCHARATE, AMPHETAMINE ASPARTATE MONOHYDRATE, DEXTROAMPHETAMINE SULFATE AND AMPHETAMINE SULFATE 5; 5; 5; 5 MG/1; MG/1; MG/1; MG/1
20 CAPSULE, EXTENDED RELEASE ORAL EVERY MORNING
Qty: 30 CAPSULE | Refills: 0 | Status: SHIPPED | OUTPATIENT
Start: 2023-12-28 | End: 2024-01-05 | Stop reason: SDUPTHER

## 2023-11-03 NOTE — PROGRESS NOTES
LMP 09/21/2023       ===========    Chief Complaint: No chief complaint on file.          HPI    Bozena Queen is a 33 y.o. female     here for    The patient location is:  [place]  The chief complaint leading to consultation is:  ADD med follow-up    Visit type:  Audiovisual    Face to Face time with patient:  4 minute  Eight minutes of total time spent on the encounter, which includes face to face time and non-face to face time preparing to see the patient (eg, review of tests), Obtaining and/or reviewing separately obtained history, Documenting clinical information in the electronic or other health record, Independently interpreting results (not separately reported) and communicating results to the patient/family/caregiver, or Care coordination (not separately reported).         Each patient to whom he or she provides medical services by telemedicine is:  (1) informed of the relationship between the physician and patient and the respective role of any other health care provider with respect to management of the patient; and (2) notified that he or she may decline to receive medical services by telemedicine and may withdraw from such care at any time.    Notes:     See plan    Patient queried and denies any further complaints      Patient Active Problem List   Diagnosis    Attention deficit hyperactivity disorder (ADHD), predominantly inattentive type    Class 3 severe obesity due to excess calories without serious comorbidity with body mass index (BMI) of 40.0 to 44.9 in adult       SURGICAL AND MEDICAL HISTORY: updated and reviewed.  No past surgical history on file.  ALLERGIES updated and reviewed.  Review of patient's allergies indicates:  No Known Allergies    CURRENT OUTPATIENT MEDICATIONS updated and reviewed    Current Outpatient Medications:     [START ON 12/28/2023] dextroamphetamine-amphetamine (ADDERALL XR) 20 MG 24 hr capsule, Take 1 capsule (20 mg total) by mouth every morning., Disp: 30 capsule,  Rfl: 0    [START ON 11/30/2023] dextroamphetamine-amphetamine (ADDERALL XR) 20 MG 24 hr capsule, Take 1 capsule (20 mg total) by mouth every morning., Disp: 30 capsule, Rfl: 0    dextroamphetamine-amphetamine (ADDERALL XR) 20 MG 24 hr capsule, Take 1 capsule (20 mg total) by mouth every morning., Disp: 30 capsule, Rfl: 0    Review of Systems   Constitutional:  Negative for activity change, appetite change, chills, diaphoresis, fatigue, fever and unexpected weight change.   HENT:  Negative for congestion, ear discharge, ear pain, facial swelling, hearing loss, nosebleeds, postnasal drip, rhinorrhea, sinus pressure, sneezing, sore throat, tinnitus, trouble swallowing and voice change.    Eyes:  Negative for photophobia, pain, discharge, redness, itching and visual disturbance.   Respiratory:  Negative for cough, chest tightness, shortness of breath and wheezing.    Cardiovascular:  Negative for chest pain, palpitations and leg swelling.   Gastrointestinal:  Negative for abdominal distention, abdominal pain, anal bleeding, blood in stool, constipation, diarrhea, nausea, rectal pain and vomiting.   Endocrine: Negative for cold intolerance, heat intolerance, polydipsia, polyphagia and polyuria.   Genitourinary:  Negative for difficulty urinating, dysuria, flank pain, hematuria and menstrual problem.   Musculoskeletal:  Negative for arthralgias, back pain, joint swelling, myalgias and neck pain.   Skin:  Negative for rash.   Neurological:  Negative for dizziness, tremors, seizures, syncope, speech difficulty, weakness, light-headedness, numbness and headaches.   Psychiatric/Behavioral:  Negative for behavioral problems, confusion, decreased concentration, dysphoric mood, sleep disturbance and suicidal ideas. The patient is not nervous/anxious and is not hyperactive.        LMP 09/21/2023   Physical Exam  Constitutional:       General: She is not in acute distress.     Appearance: Normal appearance. She is not  ill-appearing.   Neurological:      Mental Status: She is alert.   Psychiatric:         Mood and Affect: Mood normal.         Behavior: Behavior normal.         ASSESSMENT/PLAN  Diagnoses and all orders for this visit:    Attention deficit hyperactivity disorder (ADHD), predominantly inattentive type  -     dextroamphetamine-amphetamine (ADDERALL XR) 20 MG 24 hr capsule; Take 1 capsule (20 mg total) by mouth every morning.    Other orders  -     dextroamphetamine-amphetamine (ADDERALL XR) 20 MG 24 hr capsule; Take 1 capsule (20 mg total) by mouth every morning.  -     dextroamphetamine-amphetamine (ADDERALL XR) 20 MG 24 hr capsule; Take 1 capsule (20 mg total) by mouth every morning.      Continue meds as above.  Follow-up in 3 months.      Most recent some lab results reviewed, if available.  Any new prescription medications gone over in detail including reason for taking the medication, the general mechanism of action, most common possible side effects and possible costs, etcetera.    Chronic conditions updated. Other than changes or additions as above, cont current medications and maintain follow-up with specialists if indicated.     Jon Mckay MD  A dictation device was used to produce this document. Use of such devices sometimes results in grammatical errors or replacement of words that sound similarly.

## 2024-01-05 DIAGNOSIS — F90.0 ATTENTION DEFICIT HYPERACTIVITY DISORDER (ADHD), PREDOMINANTLY INATTENTIVE TYPE: ICD-10-CM

## 2024-01-05 NOTE — TELEPHONE ENCOUNTER
No care due was identified.  [facility] Embedded Care Due Messages. Reference number: [id number].   1/05/2024 6:22:03 AM CST

## 2024-01-08 RX ORDER — DEXTROAMPHETAMINE SACCHARATE, AMPHETAMINE ASPARTATE MONOHYDRATE, DEXTROAMPHETAMINE SULFATE AND AMPHETAMINE SULFATE 2.5; 2.5; 2.5; 2.5 MG/1; MG/1; MG/1; MG/1
10 CAPSULE, EXTENDED RELEASE ORAL EVERY MORNING
Qty: 30 CAPSULE | Refills: 0 | Status: SHIPPED | OUTPATIENT
Start: 2024-01-08

## 2024-02-23 ENCOUNTER — PATIENT MESSAGE (OUTPATIENT)
Dept: OBSTETRICS AND GYNECOLOGY | Facility: CLINIC | Age: 34
End: 2024-02-23
Payer: COMMERCIAL

## 2024-02-23 DIAGNOSIS — Z78.9 IMMUNE TO MEASLES: Primary | ICD-10-CM

## 2024-02-24 ENCOUNTER — LAB VISIT (OUTPATIENT)
Dept: LAB | Facility: HOSPITAL | Age: 34
End: 2024-02-24
Payer: COMMERCIAL

## 2024-02-24 DIAGNOSIS — Z78.9 IMMUNE TO MEASLES: ICD-10-CM

## 2024-02-24 PROCEDURE — 86765 RUBEOLA ANTIBODY: CPT | Performed by: OBSTETRICS & GYNECOLOGY

## 2024-02-24 PROCEDURE — 36415 COLL VENOUS BLD VENIPUNCTURE: CPT | Performed by: OBSTETRICS & GYNECOLOGY

## 2024-02-26 LAB
RUBEOLA IGG ANTIBODY: 7.53 AU/ML
RUBEOLA INTERPRETATION: NEGATIVE

## 2024-03-28 ENCOUNTER — TELEPHONE (OUTPATIENT)
Dept: PRIMARY CARE CLINIC | Facility: CLINIC | Age: 34
End: 2024-03-28
Payer: COMMERCIAL

## 2024-03-28 NOTE — LETTER
March 28, 2024    Bozena Queen  [address]  [facility] [address]             [facility]  [address] [place] [address]  Phone: [phone]  Fax: [phone] Dear Ms. Queen:    Effective June 30, 2024, Dr. Jon Mckay of [facility] will no longer provide treatment for Attention Deficit Disorder/Attention Deficit Hyperactivity Disorder (ADD/ADHD). All patients will have a referral placed to Psychiatry. The telephone number of the Psychiatry Department is [phone]. The wait time to meet with the [facility] can take several weeks to months. He recommends also looking outside of [facility] such as [facility] or contacting your insurance for available providers. This notice allows ample time to establish a relationship with a psychiatrist or a new primary care provider who cares for adult ADD/ADHD.        Cordially,     Jon Mckay M.D.  [facility]  [address] [facility] [place] [address]

## 2024-04-10 DIAGNOSIS — F98.8 ATTENTION DEFICIT DISORDER (ADD) IN ADULT: Primary | ICD-10-CM

## 2024-04-26 ENCOUNTER — PATIENT MESSAGE (OUTPATIENT)
Dept: OBSTETRICS AND GYNECOLOGY | Facility: CLINIC | Age: 34
End: 2024-04-26
Payer: COMMERCIAL

## 2024-05-17 ENCOUNTER — LAB VISIT (OUTPATIENT)
Dept: LAB | Facility: HOSPITAL | Age: 34
End: 2024-05-17
Attending: OBSTETRICS & GYNECOLOGY
Payer: COMMERCIAL

## 2024-05-17 ENCOUNTER — OFFICE VISIT (OUTPATIENT)
Dept: OBSTETRICS AND GYNECOLOGY | Facility: CLINIC | Age: 34
End: 2024-05-17
Payer: COMMERCIAL

## 2024-05-17 DIAGNOSIS — N96 RECURRENT PREGNANCY LOSS: ICD-10-CM

## 2024-05-17 DIAGNOSIS — N96 RECURRENT PREGNANCY LOSS: Primary | ICD-10-CM

## 2024-05-17 PROCEDURE — 85300 ANTITHROMBIN III ACTIVITY: CPT | Performed by: OBSTETRICS & GYNECOLOGY

## 2024-05-17 PROCEDURE — 81241 F5 GENE: CPT | Performed by: OBSTETRICS & GYNECOLOGY

## 2024-05-17 PROCEDURE — 82166 ASSAY ANTI-MULLERIAN HORM: CPT | Performed by: OBSTETRICS & GYNECOLOGY

## 2024-05-17 PROCEDURE — 86147 CARDIOLIPIN ANTIBODY EA IG: CPT | Performed by: OBSTETRICS & GYNECOLOGY

## 2024-05-17 PROCEDURE — 36415 COLL VENOUS BLD VENIPUNCTURE: CPT | Performed by: OBSTETRICS & GYNECOLOGY

## 2024-05-17 PROCEDURE — 86146 BETA-2 GLYCOPROTEIN ANTIBODY: CPT | Mod: 59 | Performed by: OBSTETRICS & GYNECOLOGY

## 2024-05-17 PROCEDURE — 85613 RUSSELL VIPER VENOM DILUTED: CPT | Performed by: OBSTETRICS & GYNECOLOGY

## 2024-05-17 PROCEDURE — 99214 OFFICE O/P EST MOD 30 MIN: CPT | Mod: 95,,, | Performed by: OBSTETRICS & GYNECOLOGY

## 2024-05-17 RX ORDER — PROGESTERONE 200 MG/1
200 CAPSULE ORAL NIGHTLY
Qty: 30 CAPSULE | Refills: 11 | Status: SHIPPED | OUTPATIENT
Start: 2024-05-17 | End: 2025-05-17

## 2024-05-17 NOTE — PROGRESS NOTES
The patient location is: home  The chief complaint leading to consultation is: recurrent chemical pregnancies    Visit type: audiovisual    Face to Face time with patient: 25 min  30 minutes of total time spent on the encounter, which includes face to face time and non-face to face time preparing to see the patient (eg, review of tests), Obtaining and/or reviewing separately obtained history, Documenting clinical information in the electronic or other health record, Independently interpreting results (not separately reported) and communicating results to the patient/family/caregiver, or Care coordination (not separately reported).         Each patient to whom he or she provides medical services by telemedicine is:  (1) informed of the relationship between the physician and patient and the respective role of any other health care provider with respect to management of the patient; and (2) notified that he or she may decline to receive medical services by telemedicine and may withdraw from such care at any time.    Notes:     Pt is a 32 y/o who presents for a consult for recurrent chemical pregnancies.  Pt reports having 3 chemical pregnancies.  Notes that her sister had the same issue and was seen by fertility.  Reports that her sister's hx is complicated by Hashimoto's but patient does not have this history.  Pt does not have any hx of blood clots/DVTs.  Pt notes that her mother and MGM had mutliple pregnancies and no issues with conceiving later in life.  Pt has started taking ASA daily.  Would like to discuss workup and treatment for this.     Diagnoses and all orders for this visit:    Recurrent pregnancy loss  - Discussed - Pt counseled about the definition of recurrent pregnancy loss and the difficulty with estimating the occurrence of chemical pregnancies.  Discussed that there are no set workup for recurrent chemical pregnancies, but that some recommend treating like all other recurrent pregnancy loss cases.   Given that the patient has had 3 losses, will start RPL workup  - Will start with labwork for RPL  - Will also check [facility]  - Discussed ASA daily.  Discussed progesterone supplementation-- pros and cons discussed.  Discussed that that luteal phase defects is controversial but that there are few risks associated with progesterone supplementation.  Pt would like to start prometrium.  Will have start at ovulation x 14 days and then stop if negative UPT.  Continue if + UPT  - Discussed TVUS vs SIS to look for structural abnormalities.  Pt would like to wait on this and would like to start with medication and labwork.   -     Factor 5 leiden; Future  -     Cardiolipin antibody; Future  -     DRVVT; Future  -     Beta-2 Glycoprotein Abs (IgA, IgG, IgM); Future  -     Antithrombin III; Future  -     ANTIMULLERIAN HORMONE ([facility]); Future  -     progesterone (PROMETRIUM) 200 MG capsule; Take 1 capsule (200 mg total) by mouth nightly.        Orders Placed This Encounter   Procedures    Factor 5 leiden    Cardiolipin antibody    DRVVT    Beta-2 Glycoprotein Abs (IgA, IgG, IgM)    Antithrombin III    ANTIMULLERIAN HORMONE ([facility])       No follow-ups on file.    Rossy Miles MD  Obstetrics & Gynecology

## 2024-05-20 LAB
AT III ACT/NOR PPP CHRO: 95 % (ref 83–118)
F5 P.R506Q BLD/T QL: NEGATIVE
MIS SERPL-MCNC: 2.3 NG/ML (ref 0.58–8.1)

## 2024-05-21 LAB
B2 GLYCOPROT1 IGA SER QL: 2.9 U/ML
B2 GLYCOPROT1 IGG SER QL: 1.8 U/ML
B2 GLYCOPROT1 IGM SER QL: 5.7 U/ML
CARDIOLIPIN IGG SER IA-ACNC: <9.4 GPL (ref 0–14.99)
CARDIOLIPIN IGM SER IA-ACNC: <9.4 MPL (ref 0–12.49)
CONFIRM DRVVT STA-STACLOT: NORMAL S
DRVVT SCREEN TO CONFIRM RATIO: NORMAL {RATIO}
HEPARIN NT PPP QL: NORMAL
LA 3 SCREEN W REFLEX-IMP: NORMAL
LMW HEPARIN IND PLT AB SER QL: NORMAL
MIXING DRVVT/NORMAL: NORMAL %
NEUTRALIZED DRVVT SCREEN RATIO: NORMAL
PROTHROMBIN TIME: 12.4 S (ref 12–15.5)
SCREEN APTT/NORMAL: 1.12
SCREEN APTT/NORMAL: NORMAL
SCREEN DRVVT/NORMAL: 0.89 %
THROMBIN TIME: NORMAL S

## 2024-07-18 ENCOUNTER — PATIENT MESSAGE (OUTPATIENT)
Dept: OBSTETRICS AND GYNECOLOGY | Facility: CLINIC | Age: 34
End: 2024-07-18
Payer: COMMERCIAL

## 2024-07-25 ENCOUNTER — LAB VISIT (OUTPATIENT)
Dept: LAB | Facility: HOSPITAL | Age: 34
End: 2024-07-25
Attending: OBSTETRICS & GYNECOLOGY
Payer: COMMERCIAL

## 2024-07-25 ENCOUNTER — CLINICAL SUPPORT (OUTPATIENT)
Dept: OBSTETRICS AND GYNECOLOGY | Facility: CLINIC | Age: 34
End: 2024-07-25
Payer: COMMERCIAL

## 2024-07-25 DIAGNOSIS — N91.2 AMENORRHEA: ICD-10-CM

## 2024-07-25 DIAGNOSIS — N91.2 AMENORRHEA: Primary | ICD-10-CM

## 2024-07-25 LAB — HCG INTACT+B SERPL-ACNC: 4496 MIU/ML

## 2024-07-25 PROCEDURE — 36415 COLL VENOUS BLD VENIPUNCTURE: CPT | Mod: PO | Performed by: OBSTETRICS & GYNECOLOGY

## 2024-07-25 PROCEDURE — 84702 CHORIONIC GONADOTROPIN TEST: CPT | Performed by: OBSTETRICS & GYNECOLOGY

## 2024-08-12 ENCOUNTER — PATIENT MESSAGE (OUTPATIENT)
Dept: OBSTETRICS AND GYNECOLOGY | Facility: CLINIC | Age: 34
End: 2024-08-12
Payer: COMMERCIAL

## 2024-08-15 ENCOUNTER — OFFICE VISIT (OUTPATIENT)
Dept: OBSTETRICS AND GYNECOLOGY | Facility: CLINIC | Age: 34
End: 2024-08-15
Payer: COMMERCIAL

## 2024-08-15 ENCOUNTER — HOSPITAL ENCOUNTER (OUTPATIENT)
Dept: PERINATAL CARE | Facility: OTHER | Age: 34
Discharge: HOME | End: 2024-08-15
Attending: OBSTETRICS & GYNECOLOGY
Payer: COMMERCIAL

## 2024-08-15 VITALS
DIASTOLIC BLOOD PRESSURE: 82 MMHG | SYSTOLIC BLOOD PRESSURE: 128 MMHG | BODY MASS INDEX: 41.1 KG/M2 | WEIGHT: 255.75 LBS | HEIGHT: 66 IN

## 2024-08-15 DIAGNOSIS — N91.2 AMENORRHEA: ICD-10-CM

## 2024-08-15 DIAGNOSIS — Z11.3 ENCOUNTER FOR SCREENING FOR INFECTIONS WITH A PREDOMINANTLY SEXUAL MODE OF TRANSMISSION: ICD-10-CM

## 2024-08-15 DIAGNOSIS — N91.4 SECONDARY OLIGOMENORRHEA: Primary | ICD-10-CM

## 2024-08-15 DIAGNOSIS — Z34.01 ENCOUNTER FOR SUPERVISION OF NORMAL FIRST PREGNANCY IN FIRST TRIMESTER: ICD-10-CM

## 2024-08-15 DIAGNOSIS — Z32.01 POSITIVE PREGNANCY TEST: ICD-10-CM

## 2024-08-15 PROCEDURE — 87491 CHLMYD TRACH DNA AMP PROBE: CPT | Performed by: NURSE PRACTITIONER

## 2024-08-15 PROCEDURE — 87086 URINE CULTURE/COLONY COUNT: CPT | Performed by: NURSE PRACTITIONER

## 2024-08-15 PROCEDURE — 99999 PR PBB SHADOW E&M-EST. PATIENT-LVL III: CPT | Mod: PBBFAC,,, | Performed by: NURSE PRACTITIONER

## 2024-08-15 PROCEDURE — 76801 OB US < 14 WKS SINGLE FETUS: CPT

## 2024-08-15 PROCEDURE — 76801 OB US < 14 WKS SINGLE FETUS: CPT | Mod: 26,,, | Performed by: OBSTETRICS & GYNECOLOGY

## 2024-08-15 NOTE — PATIENT INSTRUCTIONS
[facility], Labor and Delivery is on the 6th floor of [facility]: [phone]    [address] PHONE NUMBER, [phone] (OPEN MON-FRI, 8a-5p)    [email or web address]    1) Eat small frequent meals through the day versus three large meals  2) Try ginger ale or sprite to help settle the stomach   3) Eat crackers or dry toast before getting out of bed in the morning   4) Stay hydrated by drinking plenty of water-do not immediately eat or drink something after vomiting. Give your stomach a rest for 20-30 minutes. Slowly reintroduce ice chips, small sips water, crackers, etc.    5) Try OTC Unisom (use the tablets that have doxylamine not diphenhydramine in them, can use generic brands like Equate) and vitamin B6  (25 mg, can find on [employer]) together at night before bed. This can help with the nausea in the morning and is safe to use during pregnancy. You can also take the vitamin B6 alone, every 8 hours to help with the nausea.     If you are unable to keep anything down and constantly vomiting for more that 24 hours, let the office know so that dehydration can be avoided. We would recommend being seen in the emergency department if this is the case.     CHROMOSOMAL TESTING OPTIONS IN PREGNANCY    The sequential screen is a test done around 12-14 weeks that consists of an ultrasound that measures the nuchal fold (neck thickness) of baby and blood work on the same day. You get a second set of labs done a few weeks later after 15 weeks. Based on all three of these results, they are able to tell you if you are considered to be low risk or high risk regarding neural tube defects and chromosomal abnormalities like Down Syndrome. If you are at all interested, I usually place the order today so we do not miss the window. Someone will give you a phone call in a week or two to schedule this. If you do not want it, just tell them no thank you. This test is typically covered by your insurance and is performed by the  Maternal Fetal Medicine (MFM) department here at [facility]. It will not tell you the sex of the baby.  Call [phone] to see about coverage and any out of pocket costs regarding the [id number] (MT21) testing- also known as cell free DNA testing (cfDNA). We recommend doing this test any day after 11 weeks to ensure the most accuracy. This test checks for any chromosomal abnormalities like Down Syndrome. You can also find out the sex of the baby if you choose to know. Once you find out coverage and decide to proceed, send either myself or your doctor a message and we can see what date you can do it. It is done at our second floor lab at [facility].

## 2024-08-15 NOTE — PROGRESS NOTES
CC: Positive Pregnancy Test    HISTORY OF PRESENT ILLNESS:    Bozena Queen is a 33 y.o. female, No obstetric history on file.,  Presents today for a routine exam complaining of amenorrhea and positive home urine pregnancy test.  Patient's last menstrual period was 06/20/2024. Doing well, very excited. Here with her . Dating scan done. First pregnancy- prior chemical pregnancies. One episode last week of spotting. Some gas pain bloating. Stopped Adderall months ago.  Fu with Dr. Miles    No tobacco use  Denies hx of genital herpes  Taking prenatal vitamins, progesterone, asa 81 mg  No known FH of SC, CF, or known birth defects    Pregnancy   =========   Tran pregnancy. Number of embryos: 1     Dating   ======   LMP on: 6/20/2024   GA by LMP 8 w + 0 d   MAYNOR by LMP: 3/27/2025   Ultrasound examination on: 8/15/2024   GA by U/S based upon: CRL   GA by U/S 8 w + 0 d   MAYNOR by U/S: 3/27/2025   Assigned: based on ultrasound (CRL), selected on 08/15/2024   Assigned GA 8 w + 0 d   Assigned MAYNOR: 3/27/2025     Assessment   ==========   Gestational sac: visualized   Location: intrauterine   Yolk sac: visualized   Amniotic sac: visualized   Embryo: visualized   CRL 16.4 mm 8w 0d Hadlock   Cardiac activity: present    bpm     ROS:  GENERAL: No weight changes. No swelling. No fatigue. No fever. Food aversions, mild nausea  CARDIOVASCULAR: No chest pain. No shortness of breath. No leg cramps.   NEUROLOGICAL: No headaches. No vision changes.  BREASTS: No pain. No lumps. No discharge.  ABDOMEN: No pain. No diarrhea. pos constipation.  REPRODUCTIVE: No abnormal bleeding.   VULVA: No pain. No lesions. No itching.  VAGINA: No relaxation. No itching. No odor. No discharge. No lesions.  URINARY: No incontinence. No nocturia. No frequency. No dysuria.    MEDICATIONS AND ALLERGIES:  Reviewed    COMPREHENSIVE GYN HISTORY:  PAP History: Denies abnormal Paps.  Infection History: Denies STDs. Denies PID.  Benign History:  "Denies uterine fibroids. Denies ovarian cysts. Denies endometriosis. Denies other conditions.  Cancer History: Denies cervical cancer. Denies uterine cancer or hyperplasia. Denies ovarian cancer. Denies vulvar cancer or pre-cancer. Denies vaginal cancer or pre-cancer. Denies breast cancer. Denies colon cancer.  Sexual Activity History: Reports currently being sexually active  Menstrual History: None.  Contraception: None    /82   Ht 5' 6" (1.676 m)   Wt 116 kg (255 lb 11.7 oz)   LMP 06/20/2024   BMI 41.28 kg/m²     PE:  AFFECT: Calm, alert and oriented X 3. Interactive during exam  GENERAL: Appears well-nourished, well-developed, in no acute distress.  HEAD: Normocephalic, atruamatic  TEETH: Good dentition.  THYROID: No thyromegally   BREASTS: No masses, skin changes, nipple discharge or adenopathy bilaterally.  SKIN: Normal for race, warm, & dry. No lesions or rashes.  LUNGS: Easy and unlabored, clear to auscultation bilaterally.  HEART: Regular rate and rhythm   ABDOMEN: Soft and nontender without masses or organomegally.  VULVA: No lesions, masses or tenderness.  VAGINA: Moist and well rugated without lesions or discharge.  CERVIX: Moist and pink without lesions, discharge or tenderness.      UTERUS SIZE: 8 week size, nontender and without masses.  ADNEXA: No masses or tenderness.  ESTIMATE OF PELVIC CAPACITY: Adequate  EXTREMITIES: No cyanosis, clubbing or edema. No calf tenderness.  LYMPH NODES: No axillary or inguinal adenopathy.    PROCEDURES:  UPT Positive  Genprobe    ASSESSMENT/PLAN:  Amenorrhea  Positive urine pregnancy test   -  Routine prenatal care    Nausea and vomiting in pregnancy    -  Education regarding lifestyle and dietary modifications    -  Advised use of B6/Unisom. Pt will notify us if no relief/worsening symptoms, will consider Zofran if needed.    1st TRIMESTER COUNSELING: Discussed all, booklet provided:  Common complaints of pregnancy  HIV and other routine prenatal tests " including  genetic screening  Risk factors identified by prenatal history  Oriented to practice - discussed anticipated course of prenatal care & indications for Ultrasound  Childbirth classes/Hospital facilities   Nutrition and weight gain counseling  Toxoplasmosis precautions (Cats/Raw Meat)  Sexual activity and exercise  Environmental/Work hazards  Travel  Tobacco (Ask, Advise, Assess, Assist, and Arrange), as well as alcohol and drug use  Use of any medications (Including supplements, Vitamins, Herbs, or OTC Drugs)  Domestic violence  Seat belt use    TERATOLOGY COUNSELING:   Discussed indications and options for aneuploidy screening - pamphlets given    FOLLOW-UP in 4 weeks with Dr. Jd NOBLES labs today  C/w asa  Pap current  GC and urine cx pending    Mignon Alcantara NP  OB/GYN

## 2024-08-16 LAB
BACTERIA UR CULT: NO GROWTH
C TRACH DNA SPEC QL NAA+PROBE: NOT DETECTED
N GONORRHOEA DNA SPEC QL NAA+PROBE: NOT DETECTED

## 2024-09-10 ENCOUNTER — INITIAL PRENATAL (OUTPATIENT)
Dept: OBSTETRICS AND GYNECOLOGY | Facility: CLINIC | Age: 34
End: 2024-09-10
Payer: COMMERCIAL

## 2024-09-10 VITALS
BODY MASS INDEX: 41.74 KG/M2 | SYSTOLIC BLOOD PRESSURE: 128 MMHG | WEIGHT: 258.63 LBS | DIASTOLIC BLOOD PRESSURE: 76 MMHG

## 2024-09-10 DIAGNOSIS — Z34.01 ENCOUNTER FOR SUPERVISION OF NORMAL FIRST PREGNANCY IN FIRST TRIMESTER: Primary | ICD-10-CM

## 2024-09-10 PROCEDURE — 99999 PR PBB SHADOW E&M-EST. PATIENT-LVL II: CPT | Mod: PBBFAC,,, | Performed by: OBSTETRICS & GYNECOLOGY

## 2024-09-10 PROCEDURE — 0500F INITIAL PRENATAL CARE VISIT: CPT | Mod: CPTII,S$GLB,, | Performed by: OBSTETRICS & GYNECOLOGY

## 2024-09-10 RX ORDER — ASPIRIN 81 MG/1
81 TABLET ORAL DAILY
COMMUNITY
Start: 2024-09-10 | End: 2025-06-17

## 2024-09-10 NOTE — PROGRESS NOTES
Pt is a G1 here for initial OB visit.  Plan to have NIPT.  Will do today.  AFP at next visit.  Pt doing well.  Denies any issues at this time.  Denies regular CTX, VB, VD, LOF.  Continue PNV.  Labor precautions reviewed.  Pt to RTC in 4 week.  Taking ASA daily.  Will sign up for [employer] [facility]OM.

## 2024-09-13 ENCOUNTER — PATIENT MESSAGE (OUTPATIENT)
Dept: ADMINISTRATIVE | Facility: OTHER | Age: 34
End: 2024-09-13
Payer: COMMERCIAL

## 2024-09-20 ENCOUNTER — PATIENT MESSAGE (OUTPATIENT)
Dept: OBSTETRICS AND GYNECOLOGY | Facility: CLINIC | Age: 34
End: 2024-09-20
Payer: COMMERCIAL

## 2024-09-24 ENCOUNTER — TELEPHONE (OUTPATIENT)
Dept: OBSTETRICS AND GYNECOLOGY | Facility: CLINIC | Age: 34
End: 2024-09-24
Payer: COMMERCIAL

## 2024-09-24 NOTE — TELEPHONE ENCOUNTER
Spoke to patient and informed her of negative [id number] results per Jd. Patient requested gender in an envelope.

## 2024-10-10 ENCOUNTER — PATIENT MESSAGE (OUTPATIENT)
Dept: OTHER | Facility: OTHER | Age: 34
End: 2024-10-10
Payer: COMMERCIAL

## 2024-10-17 ENCOUNTER — ROUTINE PRENATAL (OUTPATIENT)
Dept: OBSTETRICS AND GYNECOLOGY | Facility: CLINIC | Age: 34
End: 2024-10-17
Payer: COMMERCIAL

## 2024-10-17 ENCOUNTER — PATIENT MESSAGE (OUTPATIENT)
Dept: OTHER | Facility: OTHER | Age: 34
End: 2024-10-17
Payer: COMMERCIAL

## 2024-10-17 VITALS
BODY MASS INDEX: 41.99 KG/M2 | DIASTOLIC BLOOD PRESSURE: 80 MMHG | SYSTOLIC BLOOD PRESSURE: 130 MMHG | WEIGHT: 260.13 LBS

## 2024-10-17 DIAGNOSIS — Z34.02 ENCOUNTER FOR SUPERVISION OF NORMAL FIRST PREGNANCY IN SECOND TRIMESTER: Primary | ICD-10-CM

## 2024-10-17 PROCEDURE — 99999 PR PBB SHADOW E&M-EST. PATIENT-LVL II: CPT | Mod: PBBFAC,,, | Performed by: OBSTETRICS & GYNECOLOGY

## 2024-10-17 PROCEDURE — 0502F SUBSEQUENT PRENATAL CARE: CPT | Mod: CPTII,S$GLB,, | Performed by: OBSTETRICS & GYNECOLOGY

## 2024-10-17 NOTE — PROGRESS NOTES
Pt doing well.  Denies any issues at this time.  Denies regular CTX, VB, VD, LOF.  + FM.  Continue PNV.  Labor precautions reviewed.  Pt to RTC in 4 week.  Glucose and CBC at next visit.  Anatomy US ordered.  NIPT negative.     Rossy Miles MD  Obstetrics & Gynecology

## 2024-10-18 ENCOUNTER — PATIENT MESSAGE (OUTPATIENT)
Dept: MATERNAL FETAL MEDICINE | Facility: CLINIC | Age: 34
End: 2024-10-18
Payer: COMMERCIAL

## 2024-11-05 ENCOUNTER — PROCEDURE VISIT (OUTPATIENT)
Dept: MATERNAL FETAL MEDICINE | Facility: CLINIC | Age: 34
End: 2024-11-05
Payer: COMMERCIAL

## 2024-11-05 DIAGNOSIS — Z36.2 ENCOUNTER FOR FOLLOW-UP ULTRASOUND OF FETAL ANATOMY: Primary | ICD-10-CM

## 2024-11-05 DIAGNOSIS — Z34.02 ENCOUNTER FOR SUPERVISION OF NORMAL FIRST PREGNANCY IN SECOND TRIMESTER: ICD-10-CM

## 2024-11-05 PROCEDURE — 76811 OB US DETAILED SNGL FETUS: CPT | Mod: S$GLB,,, | Performed by: OBSTETRICS & GYNECOLOGY

## 2024-11-07 ENCOUNTER — PATIENT MESSAGE (OUTPATIENT)
Dept: OTHER | Facility: OTHER | Age: 34
End: 2024-11-07
Payer: COMMERCIAL

## 2024-12-04 ENCOUNTER — PATIENT MESSAGE (OUTPATIENT)
Dept: OBSTETRICS AND GYNECOLOGY | Facility: CLINIC | Age: 34
End: 2024-12-04
Payer: COMMERCIAL

## 2024-12-04 NOTE — TELEPHONE ENCOUNTER
Spoke to patient regarding bright red spotting last night and has not seen anything since last night. Denies cramping and bleeding that is saturating a pad/pantiliner. Denies recent intercourse and/or pelvic straining. Advised patient of bleeding precautions and does not need to come in sooner since spotting has stopped.

## 2024-12-05 ENCOUNTER — PATIENT MESSAGE (OUTPATIENT)
Dept: OTHER | Facility: OTHER | Age: 34
End: 2024-12-05
Payer: COMMERCIAL

## 2024-12-05 ENCOUNTER — LAB VISIT (OUTPATIENT)
Dept: LAB | Facility: OTHER | Age: 34
End: 2024-12-05
Attending: OBSTETRICS & GYNECOLOGY
Payer: COMMERCIAL

## 2024-12-05 ENCOUNTER — ROUTINE PRENATAL (OUTPATIENT)
Dept: OBSTETRICS AND GYNECOLOGY | Facility: CLINIC | Age: 34
End: 2024-12-05
Payer: COMMERCIAL

## 2024-12-05 VITALS — WEIGHT: 270.75 LBS | SYSTOLIC BLOOD PRESSURE: 132 MMHG | DIASTOLIC BLOOD PRESSURE: 82 MMHG | BODY MASS INDEX: 43.7 KG/M2

## 2024-12-05 DIAGNOSIS — R39.15 URGENCY OF URINATION: ICD-10-CM

## 2024-12-05 DIAGNOSIS — Z34.02 ENCOUNTER FOR SUPERVISION OF NORMAL FIRST PREGNANCY IN SECOND TRIMESTER: ICD-10-CM

## 2024-12-05 DIAGNOSIS — Z34.02 ENCOUNTER FOR SUPERVISION OF NORMAL FIRST PREGNANCY IN SECOND TRIMESTER: Primary | ICD-10-CM

## 2024-12-05 LAB
BASOPHILS # BLD AUTO: 0.03 K/UL (ref 0–0.2)
BASOPHILS NFR BLD: 0.2 % (ref 0–1.9)
BILIRUB UR QL STRIP: NEGATIVE
CLARITY UR REFRACT.AUTO: CLEAR
COLOR UR AUTO: COLORLESS
DIFFERENTIAL METHOD BLD: ABNORMAL
EOSINOPHIL # BLD AUTO: 0.1 K/UL (ref 0–0.5)
EOSINOPHIL NFR BLD: 0.4 % (ref 0–8)
ERYTHROCYTE [DISTWIDTH] IN BLOOD BY AUTOMATED COUNT: 12.6 % (ref 11.5–14.5)
GLUCOSE SERPL-MCNC: 118 MG/DL (ref 70–140)
GLUCOSE UR QL STRIP: NEGATIVE
HCT VFR BLD AUTO: 35.7 % (ref 37–48.5)
HGB BLD-MCNC: 11.9 G/DL (ref 12–16)
HGB UR QL STRIP: NEGATIVE
IMM GRANULOCYTES # BLD AUTO: 0.18 K/UL (ref 0–0.04)
IMM GRANULOCYTES NFR BLD AUTO: 1.3 % (ref 0–0.5)
KETONES UR QL STRIP: NEGATIVE
LEUKOCYTE ESTERASE UR QL STRIP: NEGATIVE
LYMPHOCYTES # BLD AUTO: 2 K/UL (ref 1–4.8)
LYMPHOCYTES NFR BLD: 14.2 % (ref 18–48)
MCH RBC QN AUTO: 31.6 PG (ref 27–31)
MCHC RBC AUTO-ENTMCNC: 33.3 G/DL (ref 32–36)
MCV RBC AUTO: 95 FL (ref 82–98)
MONOCYTES # BLD AUTO: 0.8 K/UL (ref 0.3–1)
MONOCYTES NFR BLD: 5.7 % (ref 4–15)
NEUTROPHILS # BLD AUTO: 10.9 K/UL (ref 1.8–7.7)
NEUTROPHILS NFR BLD: 78.2 % (ref 38–73)
NITRITE UR QL STRIP: NEGATIVE
NRBC BLD-RTO: 0 /100 WBC
PH UR STRIP: 6 [PH] (ref 5–8)
PLATELET # BLD AUTO: 304 K/UL (ref 150–450)
PMV BLD AUTO: 9.7 FL (ref 9.2–12.9)
PROT UR QL STRIP: NEGATIVE
RBC # BLD AUTO: 3.77 M/UL (ref 4–5.4)
SP GR UR STRIP: 1 (ref 1–1.03)
URN SPEC COLLECT METH UR: ABNORMAL
WBC # BLD AUTO: 13.94 K/UL (ref 3.9–12.7)

## 2024-12-05 PROCEDURE — 82950 GLUCOSE TEST: CPT | Performed by: OBSTETRICS & GYNECOLOGY

## 2024-12-05 PROCEDURE — 85025 COMPLETE CBC W/AUTO DIFF WBC: CPT | Performed by: OBSTETRICS & GYNECOLOGY

## 2024-12-05 PROCEDURE — 99999 PR PBB SHADOW E&M-EST. PATIENT-LVL II: CPT | Mod: PBBFAC,,, | Performed by: OBSTETRICS & GYNECOLOGY

## 2024-12-05 PROCEDURE — 81003 URINALYSIS AUTO W/O SCOPE: CPT | Performed by: OBSTETRICS & GYNECOLOGY

## 2024-12-05 PROCEDURE — 36415 COLL VENOUS BLD VENIPUNCTURE: CPT | Performed by: OBSTETRICS & GYNECOLOGY

## 2024-12-05 PROCEDURE — 0502F SUBSEQUENT PRENATAL CARE: CPT | Mod: CPTII,S$GLB,, | Performed by: OBSTETRICS & GYNECOLOGY

## 2024-12-05 PROCEDURE — 87086 URINE CULTURE/COLONY COUNT: CPT | Performed by: OBSTETRICS & GYNECOLOGY

## 2024-12-05 NOTE — PROGRESS NOTES
Pt doing well.  Light spotting on Tuesday when wiping.  Has not happened since then.  Has hx of UTIs.  Will get UCx and UA.  Denies any issues at this time.  Having heartburn issues. Relieved with TUMs.  Denies regular CTX, VB, VD, LOF.  + FM.  Continue PNV.  Labor precautions reviewed.  Pt to RTC in 4 weeks.  Glucose and CBC today.  Tdap at next visit.

## 2024-12-07 LAB — BACTERIA UR CULT: NORMAL

## 2024-12-11 ENCOUNTER — PROCEDURE VISIT (OUTPATIENT)
Dept: MATERNAL FETAL MEDICINE | Facility: CLINIC | Age: 34
End: 2024-12-11
Payer: COMMERCIAL

## 2024-12-11 DIAGNOSIS — Z36.2 ENCOUNTER FOR FOLLOW-UP ULTRASOUND OF FETAL ANATOMY: ICD-10-CM

## 2024-12-11 DIAGNOSIS — Z36.89 ENCOUNTER FOR ULTRASOUND TO ASSESS FETAL GROWTH: Primary | ICD-10-CM

## 2024-12-11 PROCEDURE — 76816 OB US FOLLOW-UP PER FETUS: CPT | Mod: S$GLB,,, | Performed by: OBSTETRICS & GYNECOLOGY

## 2024-12-12 ENCOUNTER — PATIENT MESSAGE (OUTPATIENT)
Dept: OBSTETRICS AND GYNECOLOGY | Facility: CLINIC | Age: 34
End: 2024-12-12
Payer: COMMERCIAL

## 2024-12-16 ENCOUNTER — NURSE TRIAGE (OUTPATIENT)
Dept: ADMINISTRATIVE | Facility: CLINIC | Age: 34
End: 2024-12-16
Payer: COMMERCIAL

## 2024-12-16 ENCOUNTER — TELEPHONE (OUTPATIENT)
Dept: OBSTETRICS AND GYNECOLOGY | Facility: CLINIC | Age: 34
End: 2024-12-16
Payer: COMMERCIAL

## 2024-12-16 NOTE — TELEPHONE ENCOUNTER
Spoke to patient. States that the vomiting and diarrhea has improved. Also able to keep down fluids. Advised patient to continue hydating.

## 2024-12-16 NOTE — TELEPHONE ENCOUNTER
Around 9pm she started with vomiting and diarrhea. Temp 100.4. Pt is 25 weeks pregnant. Vomit x 4. Diarrhea x 4. Care advice recommend pt see Md today. Pt would like to come into office. Please call and advise pt. Pt is awaiting a return call.   Reason for Disposition   Weak immune system (e.g., HIV positive, cancer chemo, splenectomy, organ transplant, chronic steroids)    Additional Information   Negative: Shock suspected (e.g., cold/pale/clammy skin, too weak to stand, low BP, rapid pulse)   Negative: Difficult to awaken or acting confused (e.g., disoriented, slurred speech)   Negative: Sounds like a life-threatening emergency to the triager   Negative: SEVERE abdominal pain (e.g., excruciating) and present > 1 hour   Negative: SEVERE abdominal pain and age > 60 years   Negative: Bloody, black, or tarry bowel movements  (Exception: Chronic-unchanged black-grey bowel movements and is taking iron pills or Pepto-Bismol.)   Negative: SEVERE diarrhea (e.g., 7 or more times / day more than normal) and age > 60 years   Negative: Constant abdominal pain lasting > 2 hours   Negative: Drinking very little and dehydration suspected (e.g., no urine > 12 hours, very dry mouth, very lightheaded)   Negative: Patient sounds very sick or weak to the triager   Negative: SEVERE diarrhea (e.g., 7 or more times / day more than normal) and present > 24 hours (1 day)   Negative: MODERATE diarrhea (e.g., 4-6 times / day more than normal) and present > 48 hours (2 days)   Negative: MODERATE diarrhea (e.g., 4-6 times / day more than normal) and age > 70 years   Negative: Abdominal pain  (Exceptions: Pain clears completely with each passage of diarrhea stool,  or symptoms similar to previously diagnosed irritable bowel syndrome.)   Negative: Fever > 101 F (38.3 C)   Negative: Blood in the stool  (Exception: Only on toilet paper. Reason: Diarrhea can cause rectal irritation with blood on wiping.)   Negative: Mucus or pus in stool has been  present > 2 days and diarrhea is more than mild    Protocols used: Diarrhea-A-[place]

## 2024-12-19 ENCOUNTER — PATIENT MESSAGE (OUTPATIENT)
Dept: OTHER | Facility: OTHER | Age: 34
End: 2024-12-19
Payer: COMMERCIAL

## 2025-01-02 ENCOUNTER — PATIENT MESSAGE (OUTPATIENT)
Dept: OTHER | Facility: OTHER | Age: 35
End: 2025-01-02
Payer: COMMERCIAL

## 2025-01-09 ENCOUNTER — CLINICAL SUPPORT (OUTPATIENT)
Dept: OBSTETRICS AND GYNECOLOGY | Facility: CLINIC | Age: 35
End: 2025-01-09
Payer: COMMERCIAL

## 2025-01-09 ENCOUNTER — ROUTINE PRENATAL (OUTPATIENT)
Dept: OBSTETRICS AND GYNECOLOGY | Facility: CLINIC | Age: 35
End: 2025-01-09
Payer: COMMERCIAL

## 2025-01-09 VITALS — BODY MASS INDEX: 43.98 KG/M2 | DIASTOLIC BLOOD PRESSURE: 87 MMHG | SYSTOLIC BLOOD PRESSURE: 124 MMHG | WEIGHT: 272.5 LBS

## 2025-01-09 DIAGNOSIS — Z3A.29 29 WEEKS GESTATION OF PREGNANCY: Primary | ICD-10-CM

## 2025-01-09 DIAGNOSIS — Z34.03 ENCOUNTER FOR SUPERVISION OF NORMAL FIRST PREGNANCY IN THIRD TRIMESTER: Primary | ICD-10-CM

## 2025-01-09 PROCEDURE — 0502F SUBSEQUENT PRENATAL CARE: CPT | Mod: CPTII,S$GLB,, | Performed by: OBSTETRICS & GYNECOLOGY

## 2025-01-09 PROCEDURE — 99999 PR PBB SHADOW E&M-EST. PATIENT-LVL III: CPT | Mod: PBBFAC,,, | Performed by: OBSTETRICS & GYNECOLOGY

## 2025-01-09 PROCEDURE — 99999 PR PBB SHADOW E&M-EST. PATIENT-LVL I: CPT | Mod: PBBFAC,,,

## 2025-01-09 NOTE — PROGRESS NOTES
Pt doing well.  Denies any issues at this time.  Denies regular CTX, VB, VD, LOF.  + FM.  Continue PNV.  Labor precautions reviewed.  Pt to RTC in 2 weeks.  Tdap today.  Has 32 week growth US scheduled.  RSV at next visit.  Due to BMI, will start PNT weekly at 32 weeks.  3rd trim labs ordered.

## 2025-01-16 ENCOUNTER — PATIENT MESSAGE (OUTPATIENT)
Dept: OTHER | Facility: OTHER | Age: 35
End: 2025-01-16
Payer: COMMERCIAL

## 2025-01-30 ENCOUNTER — ROUTINE PRENATAL (OUTPATIENT)
Dept: OBSTETRICS AND GYNECOLOGY | Facility: CLINIC | Age: 35
End: 2025-01-30
Payer: COMMERCIAL

## 2025-01-30 ENCOUNTER — PATIENT MESSAGE (OUTPATIENT)
Dept: OTHER | Facility: OTHER | Age: 35
End: 2025-01-30
Payer: COMMERCIAL

## 2025-01-30 VITALS
WEIGHT: 274.06 LBS | SYSTOLIC BLOOD PRESSURE: 124 MMHG | BODY MASS INDEX: 44.23 KG/M2 | DIASTOLIC BLOOD PRESSURE: 80 MMHG

## 2025-01-30 DIAGNOSIS — Z34.03 ENCOUNTER FOR SUPERVISION OF NORMAL FIRST PREGNANCY IN THIRD TRIMESTER: Primary | ICD-10-CM

## 2025-01-30 DIAGNOSIS — Z34.90 ENCOUNTER FOR INDUCTION OF LABOR: ICD-10-CM

## 2025-01-30 DIAGNOSIS — Z29.11 NEED FOR RSV VACCINATION: ICD-10-CM

## 2025-01-30 PROCEDURE — 90471 IMMUNIZATION ADMIN: CPT | Mod: S$GLB,,, | Performed by: OBSTETRICS & GYNECOLOGY

## 2025-01-30 PROCEDURE — 90678 RSV VACC PREF BIVALENT IM: CPT | Mod: S$GLB,,, | Performed by: OBSTETRICS & GYNECOLOGY

## 2025-01-30 PROCEDURE — 99999 PR PBB SHADOW E&M-EST. PATIENT-LVL II: CPT | Mod: PBBFAC,,, | Performed by: OBSTETRICS & GYNECOLOGY

## 2025-01-30 PROCEDURE — 0502F SUBSEQUENT PRENATAL CARE: CPT | Mod: CPTII,S$GLB,, | Performed by: OBSTETRICS & GYNECOLOGY

## 2025-01-30 NOTE — PROGRESS NOTES
Pt doing well.  Denies any issues at this time.  Denies regular CTX, VB, VD, LOF.  + FM.  Continue PNV.  Labor precautions reviewed.  Pt to RTC in 3-4 weeks.  RSV today.  32 week growth US next week.  3rd trim labs ordered.  Discussion had about induction options.  Pt to let me know final date to schedule.  Will plan to start PNT weekly at 32 weeks.

## 2025-01-30 NOTE — PROGRESS NOTES
Here for RSV injection. Patient without complaint of pain at this time, injection given. Tolerated well no pain noted post injection advised to wait in lobby 15 minutes and report any adverse reactions.     Site:ld

## 2025-02-05 ENCOUNTER — PROCEDURE VISIT (OUTPATIENT)
Dept: MATERNAL FETAL MEDICINE | Facility: CLINIC | Age: 35
End: 2025-02-05
Payer: COMMERCIAL

## 2025-02-05 DIAGNOSIS — Z36.89 ENCOUNTER FOR ULTRASOUND TO ASSESS FETAL GROWTH: ICD-10-CM

## 2025-02-05 PROCEDURE — 76816 OB US FOLLOW-UP PER FETUS: CPT | Mod: S$GLB,,, | Performed by: OBSTETRICS & GYNECOLOGY

## 2025-02-20 ENCOUNTER — PATIENT MESSAGE (OUTPATIENT)
Dept: OTHER | Facility: OTHER | Age: 35
End: 2025-02-20
Payer: COMMERCIAL

## 2025-02-26 ENCOUNTER — HOSPITAL ENCOUNTER (EMERGENCY)
Facility: OTHER | Age: 35
Discharge: HOME | End: 2025-02-26
Attending: OBSTETRICS & GYNECOLOGY
Payer: COMMERCIAL

## 2025-02-26 ENCOUNTER — NURSE TRIAGE (OUTPATIENT)
Dept: ADMINISTRATIVE | Facility: CLINIC | Age: 35
End: 2025-02-26
Payer: COMMERCIAL

## 2025-02-26 VITALS
DIASTOLIC BLOOD PRESSURE: 73 MMHG | TEMPERATURE: 99 F | HEART RATE: 90 BPM | OXYGEN SATURATION: 97 % | SYSTOLIC BLOOD PRESSURE: 132 MMHG | RESPIRATION RATE: 20 BRPM

## 2025-02-26 DIAGNOSIS — Z3A.35 35 WEEKS GESTATION OF PREGNANCY: ICD-10-CM

## 2025-02-26 DIAGNOSIS — O16.9 ELEVATED BLOOD PRESSURE AFFECTING PREGNANCY, ANTEPARTUM: ICD-10-CM

## 2025-02-26 DIAGNOSIS — Z03.71 ENCOUNTER FOR SUSPECTED PREMATURE RUPTURE OF AMNIOTIC MEMBRANES, WITH RUPTURE OF MEMBRANES NOT FOUND: Primary | ICD-10-CM

## 2025-02-26 LAB
ALBUMIN SERPL BCP-MCNC: 2.7 G/DL (ref 3.5–5.2)
ALP SERPL-CCNC: 118 U/L (ref 40–150)
ALT SERPL W/O P-5'-P-CCNC: 23 U/L (ref 10–44)
ANION GAP SERPL CALC-SCNC: 8 MMOL/L (ref 8–16)
AST SERPL-CCNC: 21 U/L (ref 10–40)
BASOPHILS # BLD AUTO: 0.03 K/UL (ref 0–0.2)
BASOPHILS NFR BLD: 0.3 % (ref 0–1.9)
BILIRUB SERPL-MCNC: 0.2 MG/DL (ref 0.1–1)
BUN SERPL-MCNC: 8 MG/DL (ref 6–20)
CALCIUM SERPL-MCNC: 9.2 MG/DL (ref 8.7–10.5)
CHLORIDE SERPL-SCNC: 107 MMOL/L (ref 95–110)
CO2 SERPL-SCNC: 22 MMOL/L (ref 23–29)
CREAT SERPL-MCNC: 0.7 MG/DL (ref 0.5–1.4)
CREAT UR-MCNC: 129.4 MG/DL (ref 15–325)
DIFFERENTIAL METHOD BLD: ABNORMAL
EOSINOPHIL # BLD AUTO: 0.1 K/UL (ref 0–0.5)
EOSINOPHIL NFR BLD: 0.9 % (ref 0–8)
ERYTHROCYTE [DISTWIDTH] IN BLOOD BY AUTOMATED COUNT: 13.1 % (ref 11.5–14.5)
EST. GFR  (NO RACE VARIABLE): >60 ML/MIN/1.73 M^2
GLUCOSE SERPL-MCNC: 88 MG/DL (ref 70–110)
HCT VFR BLD AUTO: 32.8 % (ref 37–48.5)
HGB BLD-MCNC: 10.8 G/DL (ref 12–16)
IMM GRANULOCYTES # BLD AUTO: 0.13 K/UL (ref 0–0.04)
IMM GRANULOCYTES NFR BLD AUTO: 1.2 % (ref 0–0.5)
LYMPHOCYTES # BLD AUTO: 1.8 K/UL (ref 1–4.8)
LYMPHOCYTES NFR BLD: 17.4 % (ref 18–48)
MCH RBC QN AUTO: 30.2 PG (ref 27–31)
MCHC RBC AUTO-ENTMCNC: 32.9 G/DL (ref 32–36)
MCV RBC AUTO: 92 FL (ref 82–98)
MONOCYTES # BLD AUTO: 0.9 K/UL (ref 0.3–1)
MONOCYTES NFR BLD: 8.4 % (ref 4–15)
NEUTROPHILS # BLD AUTO: 7.5 K/UL (ref 1.8–7.7)
NEUTROPHILS NFR BLD: 71.8 % (ref 38–73)
NRBC BLD-RTO: 0 /100 WBC
PLATELET # BLD AUTO: 283 K/UL (ref 150–450)
PMV BLD AUTO: 9.2 FL (ref 9.2–12.9)
POTASSIUM SERPL-SCNC: 4 MMOL/L (ref 3.5–5.1)
PROT SERPL-MCNC: 6.9 G/DL (ref 6–8.4)
PROT UR-MCNC: 12 MG/DL (ref 0–15)
PROT/CREAT UR: 0.09 MG/G{CREAT} (ref 0–0.2)
RBC # BLD AUTO: 3.58 M/UL (ref 4–5.4)
SODIUM SERPL-SCNC: 137 MMOL/L (ref 136–145)
WBC # BLD AUTO: 10.44 K/UL (ref 3.9–12.7)

## 2025-02-26 PROCEDURE — 80053 COMPREHEN METABOLIC PANEL: CPT

## 2025-02-26 PROCEDURE — 85025 COMPLETE CBC W/AUTO DIFF WBC: CPT

## 2025-02-26 PROCEDURE — 84156 ASSAY OF PROTEIN URINE: CPT

## 2025-02-26 PROCEDURE — 59025 FETAL NON-STRESS TEST: CPT

## 2025-02-26 PROCEDURE — 99284 EMERGENCY DEPT VISIT MOD MDM: CPT | Mod: 25

## 2025-02-26 NOTE — TELEPHONE ENCOUNTER
"Think my water bag broke this am. I took a shower and then went to work. However, I have continued to have leaking since then. 35/6 days gestation. Triage done-dispo L and D now. Verb understanding.       Reason for Disposition   Leakage of fluid from vagina    Additional Information   Negative: Passed out (e.g., fainted, lost consciousness, blacked out and was not responding)   Negative: Shock suspected (e.g., cold/pale/clammy skin, too weak to stand, low BP, rapid pulse)   Negative: Difficult to awaken or acting confused (e.g., disoriented, slurred speech)   Negative: SEVERE constant abdominal pain (e.g., excruciating) and present > 1 hour   Negative: SEVERE bleeding (e.g., continuous red blood from vagina, or large blood clots)   Negative: Umbilical cord hanging out of the vagina (shiny, white, curled appearance, "like telephone cord")   Negative: Uncontrollable urge to push (i.e., feels like baby is coming out now)   Negative: Can see baby   Negative: Sounds like a life-threatening emergency to the triager   Negative: Pregnant 37 or more weeks (i.e., term)   Negative: MODERATE-SEVERE abdominal pain   Negative: Contractions < 10 minutes apart for 1 hour (i.e., 6 or more contractions an hour)   Negative: Contractions > 10 minutes apart that persist > 24 hours, and no improvement using Care Advice   Negative: Contractions and any vaginal bleeding (including: red blood, clots, spotting, or pink/brown mucous)   Negative: Vaginal bleeding or spotting  (Exception: Brief spotting after intercourse or pelvic exam.)    Protocols used: Pregnancy - Labor - Hmttmva-G-MP    "

## 2025-02-26 NOTE — DISCHARGE INSTRUCTIONS
Keep previously scheduled Clinic appointment.     Return or call if you have any of the following symptoms: blurred vision, headache, vaginal bleeding, nausea/ vomiting, leaking of fluid, contractions that are 4-5 in one hour (before 36 weeks), decreased fetal movements ( 10 kicks in 2 hours), headache not relieved by Tylenol, or temp of 100.4 and greater.  Begin doing fetal kick counts, (at least 10 movements in 2 hours) starting at 28 weeks gestation. Term Labor: We want your contractions 5 min apart for 2 hours.    Any questions or concerns call Clinic or  desk for assistance.     For any questions or concerns call your Clinic [phone]  or [facility] [phone].

## 2025-02-26 NOTE — ED PROVIDER NOTES
Encounter Date: 2025       History     Chief Complaint   Patient presents with    Rupture of Membranes     5am     Bozena Queen is a 34 y.o.  at 35w6d who presents to the OB ED today (2025) with CC of loss of fluids.    Pt states she had fluid leakage this morning at 5am, which she noticed as a wet spot in her underwear. States the fluid did not appear to have blood. This fluid recurred at noon, prompting her to come to [facility] for further assessment of symptoms. She denies a large gush. She denies fever, headache, scotomas, SOB, RUQ pain. Patient has no other complaints.    She reports - vaginal bleeding, + leakage of fluid, and - contractions.   She reports good fetal movement.  This pregnancy is complicated by AMA, MO, elevated bp x1.            Review of patient's allergies indicates:  No Known Allergies  Past Medical History:   Diagnosis Date    Obesity (BMI 30-39.9) 2019    RUQ abdominal pain 2019     No past surgical history on file.  Family History   Problem Relation Name Age of Onset    Cancer Mother      Hypertension Mother      No Known Problems Sister      No Known Problems Brother       Social History[1]  Review of Systems   Constitutional:  Negative for chills and fever.   HENT:  Negative for congestion.    Eyes:  Negative for visual disturbance.   Respiratory:  Negative for chest tightness and shortness of breath.    Cardiovascular:  Negative for chest pain and leg swelling.   Gastrointestinal:  Negative for abdominal distention, abdominal pain, constipation, diarrhea, nausea and vomiting.   Genitourinary:  Positive for vaginal discharge. Negative for dysuria, frequency, pelvic pain, vaginal bleeding and vaginal pain.   Neurological:  Negative for light-headedness and headaches.       Physical Exam     Initial Vitals   BP Pulse Resp Temp SpO2   25 1421 25 1421 25 1421 25 1421 25 1422   (!) 144/97 96 20 98.6 °F (37 °C) 98 %      MAP       --                 Physical Exam    Vitals reviewed.  Constitutional: She appears well-developed and well-nourished. No distress.   HENT:   Head: Normocephalic and atraumatic.   Eyes: Conjunctivae and EOM are normal.   Cardiovascular:  Normal rate and regular rhythm.           Pulmonary/Chest: Breath sounds normal. No respiratory distress. She has no wheezes.   Abdominal: Abdomen is soft. She exhibits no distension. There is no abdominal tenderness.   Musculoskeletal:         General: Normal range of motion.     Neurological: She is alert and oriented to person, place, and time.   Skin: Skin is warm and dry.   Psychiatric: She has a normal mood and affect. Thought content normal.     OB LABOR EXAM:   Pre-Term Labor: No.   Membranes ruptured: No.   Method: Sterile vaginal exam per MD and Sterile speculum exam per MD.   Vaginal Bleeding: none present.     Dilatation: 0.   Station: -4.     Amniotic Fluid Color: no fluid.     Comments: SSE with - pooling, - nitrazine, - ferning. Os visually closed   SVE cl/th/hi        ED Course   Obtain Fetal nonstress test (NST)    Date/Time: 2/26/2025 2:48 PM    Performed by: Rosa Tripp MD  Authorized by: Rosa Tripp MD    Nonstress Test:     Variability:  6-25 BPM    Decelerations:  None    Accelerations:  15 bpm    Baseline:  145    Uterine Irritability: No      Contractions:  Not present  Biophysical Profile:     Nonstress Test Interpretation: reactive      Overall Impression:  Reassuring  Post-procedure:     Patient tolerance:  Patient tolerated the procedure well with no immediate complications    Labs Reviewed   CBC W/ AUTO DIFFERENTIAL - Abnormal       Result Value    WBC 10.44      RBC 3.58 (*)     Hemoglobin 10.8 (*)     Hematocrit 32.8 (*)     MCV 92      MCH 30.2      MCHC 32.9      RDW 13.1      Platelets 283      MPV 9.2      Immature Granulocytes 1.2 (*)     Gran # (ANC) 7.5      Immature Grans (Abs) 0.13 (*)     Lymph # 1.8      Mono # 0.9      Eos # 0.1      Baso # 0.03       nRBC 0      Gran % 71.8      Lymph % 17.4 (*)     Mono % 8.4      Eosinophil % 0.9      Basophil % 0.3      Differential Method Automated     COMPREHENSIVE METABOLIC PANEL - Abnormal    Sodium 137      Potassium 4.0      Chloride 107      CO2 22 (*)     Glucose 88      BUN 8      Creatinine 0.7      Calcium 9.2      Total Protein 6.9      Albumin 2.7 (*)     Total Bilirubin 0.2      Alkaline Phosphatase 118      AST 21      ALT 23      eGFR >60      Anion Gap 8     PROTEIN / CREATININE RATIO, URINE    Protein, Urine Random 12      Creatinine, Urine 129.4      Prot/Creat Ratio, Urine 0.09      Narrative:     Specimen Source->Urine   PROTEIN / CREATININE RATIO, URINE          Imaging Results    None          Medications - No data to display  Medical Decision Making  Bozena Queen is a 34 y.o.  at 35w6d who presents to the OB ED today (2025) with CC of loss of fluids.      Temp:  [98.6 °F (37 °C)] 98.6 °F (37 °C)  Pulse:  [] 90  Resp:  [20] 20  SpO2:  [96 %-99 %] 97 %  BP: (132-147)/(69-97) 132/73    -NST reactive and reassuring   -[place] quiet     LOF, ROM ruled out  -PE as above   -NST reactive and reassuring   -MVP normal   -SSE with - pooling, - nitrazine, - ferning. Cvx cl/th/hi     gHTN  -Incidentally noted elevated BP as above   -CBC, CMP, urine protein cr WNL   -pt counseled regarding elevated BP, home BP monitoring, close outpatient follow up    Pt deemed stable for discharge with strict return to ed precautions discussed at bedside     Shruti Tripp MD (Mary)   Obstetrics and Gynecology, PGY1      Amount and/or Complexity of Data Reviewed  Labs: ordered.              Attending Attestation:   Physician Attestation Statement for Resident:  As the supervising MD   Physician Attestation Statement: I have personally seen and examined this patient.   I agree with the above history.  -:   As the supervising MD I agree with the above PE.     As the supervising MD I agree with the above treatment,  course, plan, and disposition.   I was personally present during the critical portions of the procedure(s) performed by the resident and was immediately available in the ED to provide services and assistance as needed during the entire procedure.  I have reviewed and agree with the residents interpretation of the following: lab data.  I have reviewed the following: old records at this facility.            Attending ED Notes:   I saw and examined the patient myself along with the resident. I have personally reviewed pertinent prior records, labs, imaging, and procedures. I have reviewed the documentation and agree with the findings and plan as documented.      at 35w6d presenting with leakage of fluid. ROM ruled out on exam and US. NST reactive and reassuring, tocometry without contractions. Incidentally noted mild range BPs, asymptomatic, labs without evidence of pre-eclampsia. Recommended home BP monitoring and close outpatient follow up. Discharge home in stable condition. Return precautions discussed.    Maci Motta MD FACOG  OB Hospitalist                                 Clinical Impression:  Final diagnoses:  [Z03.71] Encounter for suspected premature rupture of amniotic membranes, with rupture of membranes not found (Primary)  [O16.9] Elevated blood pressure affecting pregnancy, antepartum  [Z3A.35] 35 weeks gestation of pregnancy          ED Disposition Condition    Discharge Stable          ED Prescriptions    None       Follow-up Information       Follow up With Specialties Details Why Contact Info    [facility] - Emergency Dept (Obstetrics) Emergency Medicine  If symptoms worsen [address] [facility] [id number] [phone]    Rossy Miles MD Obstetrics and Gynecology  As needed, As scheduled [address] [address]  [phone]                 Rosa Tripp MD  Resident  25 1733       Rosa Tripp MD  Resident  25 1734         [1]    Social History  Tobacco Use    Smoking status: Never     Passive exposure: Never    Smokeless tobacco: Never   Substance Use Topics    Alcohol use: Yes     Alcohol/week: 2.0 standard drinks of alcohol     Types: 2 Glasses of wine per week    Drug use: No        Maci Motta MD  02/26/25 3793

## 2025-02-27 ENCOUNTER — ROUTINE PRENATAL (OUTPATIENT)
Dept: OBSTETRICS AND GYNECOLOGY | Facility: CLINIC | Age: 35
End: 2025-02-27
Payer: COMMERCIAL

## 2025-02-27 ENCOUNTER — HOSPITAL ENCOUNTER (OUTPATIENT)
Dept: PERINATAL CARE | Facility: OTHER | Age: 35
Discharge: HOME | End: 2025-02-27
Attending: OBSTETRICS & GYNECOLOGY
Payer: COMMERCIAL

## 2025-02-27 VITALS
HEART RATE: 79 BPM | SYSTOLIC BLOOD PRESSURE: 108 MMHG | BODY MASS INDEX: 45.83 KG/M2 | WEIGHT: 283.94 LBS | DIASTOLIC BLOOD PRESSURE: 80 MMHG

## 2025-02-27 DIAGNOSIS — Z34.93 ENCOUNTER FOR SUPERVISION OF NORMAL PREGNANCY IN THIRD TRIMESTER, UNSPECIFIED GRAVIDITY: Primary | ICD-10-CM

## 2025-02-27 DIAGNOSIS — Z34.90 ENCOUNTER FOR INDUCTION OF LABOR: ICD-10-CM

## 2025-02-27 PROCEDURE — 87653 STREP B DNA AMP PROBE: CPT | Performed by: OBSTETRICS & GYNECOLOGY

## 2025-02-27 PROCEDURE — 0502F SUBSEQUENT PRENATAL CARE: CPT | Mod: CPTII,S$GLB,, | Performed by: OBSTETRICS & GYNECOLOGY

## 2025-02-27 PROCEDURE — 59025 FETAL NON-STRESS TEST: CPT | Mod: 26,,, | Performed by: OBSTETRICS & GYNECOLOGY

## 2025-02-27 PROCEDURE — 76815 OB US LIMITED FETUS(S): CPT | Mod: 26,,, | Performed by: OBSTETRICS & GYNECOLOGY

## 2025-02-27 PROCEDURE — 59025 FETAL NON-STRESS TEST: CPT

## 2025-02-27 PROCEDURE — 76815 OB US LIMITED FETUS(S): CPT

## 2025-02-27 PROCEDURE — 99999 PR PBB SHADOW E&M-EST. PATIENT-LVL III: CPT | Mod: PBBFAC,,, | Performed by: OBSTETRICS & GYNECOLOGY

## 2025-02-27 NOTE — PROGRESS NOTES
Was seen in the [facility] yestereday for possible SROM.  Pt reports that she started to notice clear discharge yesterday.  Went in for evaluation.  Negative nitrizine and ferning.  No SROM noted.  Had 1 elevated BP yesterday.  Was told that she had GHTN when they reviewed her chart.  However, the only other elevated pressure is a single diastolic BP on connected MOM that, on repeat at home, was normal.  Discussed with patient that we do not consider connected MOM readings as definitive readings, as there can be user or equipment area.  THis was the case with her one elevated connected MOM reading.  Therefore, pt has only had 1 elevated BP (the one in the [facility]).  All other BPs in the [facility] were normal.  Does not meet the criteria for GHTN.  DIsucssed preE precautions.  PreE labs normal.  Will continue PNT weekly.  Will plan for IOL on 3/24 @ 1600 unless starts to have elevated BPs consistently.  Also discussed preE symptoms.  Pt to reach out with any issues.  WIll RTC in 1 week.  GBS today

## 2025-02-28 LAB — GROUP B STREPTOCOCCUS, PCR: NEGATIVE

## 2025-03-06 ENCOUNTER — ROUTINE PRENATAL (OUTPATIENT)
Dept: OBSTETRICS AND GYNECOLOGY | Facility: CLINIC | Age: 35
End: 2025-03-06
Payer: COMMERCIAL

## 2025-03-06 ENCOUNTER — ANESTHESIA EVENT (OUTPATIENT)
Dept: OBSTETRICS AND GYNECOLOGY | Facility: OTHER | Age: 35
End: 2025-03-06
Payer: COMMERCIAL

## 2025-03-06 ENCOUNTER — HOSPITAL ENCOUNTER (INPATIENT)
Facility: OTHER | Age: 35
LOS: 3 days | Discharge: HOME | End: 2025-03-09
Attending: STUDENT IN AN ORGANIZED HEALTH CARE EDUCATION/TRAINING PROGRAM | Admitting: OBSTETRICS & GYNECOLOGY
Payer: COMMERCIAL

## 2025-03-06 ENCOUNTER — ANESTHESIA (OUTPATIENT)
Dept: OBSTETRICS AND GYNECOLOGY | Facility: OTHER | Age: 35
End: 2025-03-06
Payer: COMMERCIAL

## 2025-03-06 ENCOUNTER — HOSPITAL ENCOUNTER (OUTPATIENT)
Dept: PERINATAL CARE | Facility: OTHER | Age: 35
Discharge: HOME | End: 2025-03-06
Attending: OBSTETRICS & GYNECOLOGY
Payer: COMMERCIAL

## 2025-03-06 VITALS
SYSTOLIC BLOOD PRESSURE: 148 MMHG | DIASTOLIC BLOOD PRESSURE: 100 MMHG | BODY MASS INDEX: 45.72 KG/M2 | WEIGHT: 283.31 LBS

## 2025-03-06 DIAGNOSIS — Z3A.37 37 WEEKS GESTATION OF PREGNANCY: ICD-10-CM

## 2025-03-06 DIAGNOSIS — O13.3 GESTATIONAL HYPERTENSION, THIRD TRIMESTER: ICD-10-CM

## 2025-03-06 DIAGNOSIS — O09.93 SUPERVISION OF HIGH RISK PREGNANCY IN THIRD TRIMESTER: ICD-10-CM

## 2025-03-06 DIAGNOSIS — Z34.90 ENCOUNTER FOR INDUCTION OF LABOR: ICD-10-CM

## 2025-03-06 DIAGNOSIS — Z3A.37 37 WEEKS GESTATION OF PREGNANCY: Primary | ICD-10-CM

## 2025-03-06 LAB
ABO + RH BLD: NORMAL
ALBUMIN SERPL BCP-MCNC: 2.9 G/DL (ref 3.5–5.2)
ALP SERPL-CCNC: 119 U/L (ref 40–150)
ALT SERPL W/O P-5'-P-CCNC: 20 U/L (ref 10–44)
ANION GAP SERPL CALC-SCNC: 9 MMOL/L (ref 8–16)
AST SERPL-CCNC: 18 U/L (ref 10–40)
BASOPHILS # BLD AUTO: 0.04 K/UL (ref 0–0.2)
BASOPHILS NFR BLD: 0.3 % (ref 0–1.9)
BILIRUB SERPL-MCNC: 0.3 MG/DL (ref 0.1–1)
BLD GP AB SCN CELLS X3 SERPL QL: NORMAL
BUN SERPL-MCNC: 6 MG/DL (ref 6–20)
CALCIUM SERPL-MCNC: 9.1 MG/DL (ref 8.7–10.5)
CHLORIDE SERPL-SCNC: 108 MMOL/L (ref 95–110)
CO2 SERPL-SCNC: 17 MMOL/L (ref 23–29)
CREAT SERPL-MCNC: 0.6 MG/DL (ref 0.5–1.4)
CREAT UR-MCNC: 114 MG/DL (ref 15–325)
CREAT UR-MCNC: 17 MG/DL (ref 15–325)
DIFFERENTIAL METHOD BLD: ABNORMAL
EOSINOPHIL # BLD AUTO: 0.1 K/UL (ref 0–0.5)
EOSINOPHIL NFR BLD: 0.7 % (ref 0–8)
ERYTHROCYTE [DISTWIDTH] IN BLOOD BY AUTOMATED COUNT: 13 % (ref 11.5–14.5)
EST. GFR  (NO RACE VARIABLE): >60 ML/MIN/1.73 M^2
GLUCOSE SERPL-MCNC: 86 MG/DL (ref 70–110)
HCT VFR BLD AUTO: 34.4 % (ref 37–48.5)
HGB BLD-MCNC: 11.4 G/DL (ref 12–16)
IMM GRANULOCYTES # BLD AUTO: 0.11 K/UL (ref 0–0.04)
IMM GRANULOCYTES NFR BLD AUTO: 0.9 % (ref 0–0.5)
LYMPHOCYTES # BLD AUTO: 1.5 K/UL (ref 1–4.8)
LYMPHOCYTES NFR BLD: 12.2 % (ref 18–48)
MCH RBC QN AUTO: 29.9 PG (ref 27–31)
MCHC RBC AUTO-ENTMCNC: 33.1 G/DL (ref 32–36)
MCV RBC AUTO: 90 FL (ref 82–98)
MONOCYTES # BLD AUTO: 1 K/UL (ref 0.3–1)
MONOCYTES NFR BLD: 8.2 % (ref 4–15)
NEUTROPHILS # BLD AUTO: 9.5 K/UL (ref 1.8–7.7)
NEUTROPHILS NFR BLD: 77.7 % (ref 38–73)
NRBC BLD-RTO: 0 /100 WBC
PLATELET # BLD AUTO: 313 K/UL (ref 150–450)
PMV BLD AUTO: 9.5 FL (ref 9.2–12.9)
POTASSIUM SERPL-SCNC: 3.8 MMOL/L (ref 3.5–5.1)
PROT SERPL-MCNC: 7.2 G/DL (ref 6–8.4)
PROT UR-MCNC: 14 MG/DL (ref 0–15)
PROT UR-MCNC: <7 MG/DL (ref 0–15)
PROT/CREAT UR: 0.12 MG/G{CREAT} (ref 0–0.2)
PROT/CREAT UR: NORMAL MG/G{CREAT} (ref 0–0.2)
RBC # BLD AUTO: 3.81 M/UL (ref 4–5.4)
SODIUM SERPL-SCNC: 134 MMOL/L (ref 136–145)
SPECIMEN OUTDATE: NORMAL
TREPONEMA PALLIDUM IGG+IGM AB [PRESENCE] IN SERUM OR PLASMA BY IMMUNOASSAY: NONREACTIVE
WBC # BLD AUTO: 12.26 K/UL (ref 3.9–12.7)

## 2025-03-06 PROCEDURE — 84156 ASSAY OF PROTEIN URINE: CPT

## 2025-03-06 PROCEDURE — 59025 FETAL NON-STRESS TEST: CPT | Mod: 76

## 2025-03-06 PROCEDURE — 99285 EMERGENCY DEPT VISIT HI MDM: CPT | Mod: 25

## 2025-03-06 PROCEDURE — 76815 OB US LIMITED FETUS(S): CPT | Mod: 26,,, | Performed by: STUDENT IN AN ORGANIZED HEALTH CARE EDUCATION/TRAINING PROGRAM

## 2025-03-06 PROCEDURE — 76815 OB US LIMITED FETUS(S): CPT

## 2025-03-06 PROCEDURE — 51702 INSERT TEMP BLADDER CATH: CPT

## 2025-03-06 PROCEDURE — 4A0HXCZ MEASUREMENT OF PRODUCTS OF CONCEPTION, CARDIAC RATE, EXTERNAL APPROACH: ICD-10-PCS | Performed by: STUDENT IN AN ORGANIZED HEALTH CARE EDUCATION/TRAINING PROGRAM

## 2025-03-06 PROCEDURE — 62326 NJX INTERLAMINAR LMBR/SAC: CPT

## 2025-03-06 PROCEDURE — 25000003 PHARM REV CODE 250

## 2025-03-06 PROCEDURE — 86901 BLOOD TYPING SEROLOGIC RH(D): CPT

## 2025-03-06 PROCEDURE — C1751 CATH, INF, PER/CENT/MIDLINE: HCPCS | Performed by: ANESTHESIOLOGY

## 2025-03-06 PROCEDURE — 99999 PR PBB SHADOW E&M-EST. PATIENT-LVL III: CPT | Mod: PBBFAC,,,

## 2025-03-06 PROCEDURE — 86593 SYPHILIS TEST NON-TREP QUANT: CPT

## 2025-03-06 PROCEDURE — 63600175 PHARM REV CODE 636 W HCPCS

## 2025-03-06 PROCEDURE — 11000001 HC ACUTE MED/SURG PRIVATE ROOM

## 2025-03-06 PROCEDURE — 85025 COMPLETE CBC W/AUTO DIFF WBC: CPT

## 2025-03-06 PROCEDURE — 59409 OBSTETRICAL CARE: CPT | Mod: AA,P3,, | Performed by: ANESTHESIOLOGY

## 2025-03-06 PROCEDURE — 84156 ASSAY OF PROTEIN URINE: CPT | Mod: 91

## 2025-03-06 PROCEDURE — 59025 FETAL NON-STRESS TEST: CPT | Mod: 26,59,, | Performed by: STUDENT IN AN ORGANIZED HEALTH CARE EDUCATION/TRAINING PROGRAM

## 2025-03-06 PROCEDURE — 3E0DXGC INTRODUCTION OF OTHER THERAPEUTIC SUBSTANCE INTO MOUTH AND PHARYNX, EXTERNAL APPROACH: ICD-10-PCS | Performed by: OBSTETRICS & GYNECOLOGY

## 2025-03-06 PROCEDURE — 10907ZC DRAINAGE OF AMNIOTIC FLUID, THERAPEUTIC FROM PRODUCTS OF CONCEPTION, VIA NATURAL OR ARTIFICIAL OPENING: ICD-10-PCS | Performed by: OBSTETRICS & GYNECOLOGY

## 2025-03-06 PROCEDURE — 99285 EMERGENCY DEPT VISIT HI MDM: CPT | Mod: 25,,, | Performed by: STUDENT IN AN ORGANIZED HEALTH CARE EDUCATION/TRAINING PROGRAM

## 2025-03-06 PROCEDURE — 27200710 HC EPIDURAL INFUSION PUMP SET: Performed by: ANESTHESIOLOGY

## 2025-03-06 PROCEDURE — 80053 COMPREHEN METABOLIC PANEL: CPT

## 2025-03-06 PROCEDURE — 59025 FETAL NON-STRESS TEST: CPT

## 2025-03-06 RX ORDER — SODIUM CHLORIDE 9 MG/ML
INJECTION, SOLUTION INTRAVENOUS
Status: DISCONTINUED | OUTPATIENT
Start: 2025-03-06 | End: 2025-03-07

## 2025-03-06 RX ORDER — FAMOTIDINE 10 MG/ML
20 INJECTION, SOLUTION INTRAVENOUS 2 TIMES DAILY
Status: COMPLETED | OUTPATIENT
Start: 2025-03-06 | End: 2025-03-06

## 2025-03-06 RX ORDER — FAMOTIDINE 10 MG/ML
20 INJECTION, SOLUTION INTRAVENOUS ONCE
OUTPATIENT
Start: 2025-03-06 | End: 2025-03-06

## 2025-03-06 RX ORDER — OXYTOCIN-SODIUM CHLORIDE 0.9% IV SOLN 30 UNIT/500ML 30-0.9/5 UT/ML-%
0-32 SOLUTION INTRAVENOUS CONTINUOUS
Status: DISCONTINUED | OUTPATIENT
Start: 2025-03-06 | End: 2025-03-07

## 2025-03-06 RX ORDER — FENTANYL/BUPIVACAINE/NS/PF 2MCG/ML-.1
PLASTIC BAG, INJECTION (ML) INJECTION
Status: COMPLETED
Start: 2025-03-06 | End: 2025-03-06

## 2025-03-06 RX ORDER — LIDOCAINE HYDROCHLORIDE 10 MG/ML
10 INJECTION, SOLUTION INFILTRATION; PERINEURAL ONCE AS NEEDED
Status: DISCONTINUED | OUTPATIENT
Start: 2025-03-06 | End: 2025-03-07

## 2025-03-06 RX ORDER — SODIUM CITRATE AND CITRIC ACID MONOHYDRATE 334; 500 MG/5ML; MG/5ML
30 SOLUTION ORAL ONCE
OUTPATIENT
Start: 2025-03-06 | End: 2025-03-06

## 2025-03-06 RX ORDER — FENTANYL/BUPIVACAINE/NS/PF 2MCG/ML-.1
PLASTIC BAG, INJECTION (ML) INJECTION CONTINUOUS PRN
Status: DISCONTINUED | OUTPATIENT
Start: 2025-03-06 | End: 2025-03-07

## 2025-03-06 RX ORDER — OXYTOCIN-SODIUM CHLORIDE 0.9% IV SOLN 30 UNIT/500ML 30-0.9/5 UT/ML-%
10 SOLUTION INTRAVENOUS ONCE AS NEEDED
Status: DISCONTINUED | OUTPATIENT
Start: 2025-03-06 | End: 2025-03-07

## 2025-03-06 RX ORDER — ONDANSETRON 8 MG/1
8 TABLET, ORALLY DISINTEGRATING ORAL EVERY 8 HOURS PRN
Status: DISCONTINUED | OUTPATIENT
Start: 2025-03-06 | End: 2025-03-07

## 2025-03-06 RX ORDER — LIDOCAINE HYDROCHLORIDE AND EPINEPHRINE 15; 5 MG/ML; UG/ML
INJECTION, SOLUTION EPIDURAL
Status: DISCONTINUED | OUTPATIENT
Start: 2025-03-06 | End: 2025-03-07

## 2025-03-06 RX ORDER — SIMETHICONE 80 MG
1 TABLET,CHEWABLE ORAL 4 TIMES DAILY PRN
Status: DISCONTINUED | OUTPATIENT
Start: 2025-03-06 | End: 2025-03-07

## 2025-03-06 RX ORDER — CALCIUM CARBONATE 200(500)MG
500 TABLET,CHEWABLE ORAL 3 TIMES DAILY PRN
Status: DISCONTINUED | OUTPATIENT
Start: 2025-03-06 | End: 2025-03-07

## 2025-03-06 RX ORDER — PROCHLORPERAZINE EDISYLATE 5 MG/ML
2.5 INJECTION INTRAMUSCULAR; INTRAVENOUS ONCE
Status: DISCONTINUED | OUTPATIENT
Start: 2025-03-06 | End: 2025-03-06

## 2025-03-06 RX ORDER — SODIUM CHLORIDE, SODIUM LACTATE, POTASSIUM CHLORIDE, CALCIUM CHLORIDE 600; 310; 30; 20 MG/100ML; MG/100ML; MG/100ML; MG/100ML
INJECTION, SOLUTION INTRAVENOUS CONTINUOUS
Status: DISCONTINUED | OUTPATIENT
Start: 2025-03-06 | End: 2025-03-07

## 2025-03-06 RX ADMIN — FAMOTIDINE 20 MG: 10 INJECTION, SOLUTION INTRAVENOUS at 01:03

## 2025-03-06 RX ADMIN — FENTANYL CITRATE 5 ML: 50 INJECTION INTRAMUSCULAR; INTRAVENOUS at 08:03

## 2025-03-06 RX ADMIN — LIDOCAINE HYDROCHLORIDE,EPINEPHRINE BITARTRATE 3 ML: 15; .005 INJECTION, SOLUTION EPIDURAL; INFILTRATION; INTRACAUDAL; PERINEURAL at 08:03

## 2025-03-06 RX ADMIN — FENTANYL CITRATE 10 ML/HR: 50 INJECTION INTRAMUSCULAR; INTRAVENOUS at 08:03

## 2025-03-06 RX ADMIN — Medication 4 MILLI-UNITS/MIN: at 09:03

## 2025-03-06 RX ADMIN — MISOPROSTOL 50 MCG: 100 TABLET ORAL at 03:03

## 2025-03-06 NOTE — CARE UPDATE
Resident to bedside to place fontana balloon.   - SVE: 1/50/-3, FHT: 135 +accels, -decels, Cat I  - Cytotec ordered, RN informed      Karli Kauffman MD  OBGYN   PGY-1

## 2025-03-06 NOTE — ED PROVIDER NOTES
Encounter Date: 3/6/2025       History     Chief Complaint   Patient presents with    Hypertension     Bozena Queen is a 34 y.o.  at 37w0d who presents to the OB ED today (2025) with CC of elevated blood pressure.    Patient was being seen in clinic today when she had a pressure of 148/100. She has previously had an elevated mild-range pressure in clinic on .     She denies headache, RUQ pain, changes in vision and lightheadedness. She reports some SOB and ankle swelling throughout pregnancy.     She reports no vaginal bleeding, no leakage of fluid, and no contractions.   She reports good fetal movement.  This pregnancy is complicated by newly diagnosed gHTN (as of today), AMA, Obesity (pp BMI 43).          Review of patient's allergies indicates:  No Known Allergies  Past Medical History:   Diagnosis Date    Obesity (BMI 30-39.9) 2019    RUQ abdominal pain 2019     No past surgical history on file.  Family History   Problem Relation Name Age of Onset    Cancer Mother      Hypertension Mother      No Known Problems Sister      No Known Problems Brother       Social History[1]  Review of Systems   Constitutional:  Negative for fever.   HENT:  Negative for sore throat.    Respiratory:  Positive for shortness of breath.    Cardiovascular:  Positive for leg swelling. Negative for chest pain.   Gastrointestinal:  Negative for abdominal pain and nausea.   Genitourinary:  Negative for dysuria, vaginal bleeding and vaginal discharge.   Musculoskeletal:  Negative for back pain.   Skin:  Negative for rash.   Neurological:  Negative for weakness.   Hematological:  Does not bruise/bleed easily.       Physical Exam     Initial Vitals [25 1040]   BP Pulse Resp Temp SpO2   138/83 81 -- -- 98 %      MAP       --         Physical Exam    Vitals reviewed.  Constitutional: She appears well-developed and well-nourished.   HENT:   Head: Normocephalic and atraumatic.   Eyes: EOM are normal.   Neck:   Normal  range of motion.  Cardiovascular:  Normal rate.           Trace bilateral LE edema   Pulmonary/Chest: No respiratory distress.   Abdominal: Abdomen is soft. There is no abdominal tenderness.   Musculoskeletal:         General: Normal range of motion.      Cervical back: Normal range of motion.     Neurological: She is oriented to person, place, and time.   Skin: Skin is warm and dry.   Psychiatric: She has a normal mood and affect.         ED Course   Obtain Fetal nonstress test (NST)    Date/Time: 3/6/2025 10:29 AM    Performed by: Karli Kauffman MD  Authorized by: Karli Kauffman MD    Nonstress Test:     Variability:  6-25 BPM    Decelerations:  None    Baseline:  145    Contractions:  Not present  Biophysical Profile:     Nonstress Test Interpretation: reactive      Overall Impression:  Reassuring    Labs Reviewed   COMPREHENSIVE METABOLIC PANEL - Abnormal       Result Value    Sodium 134 (*)     Potassium 3.8      Chloride 108      CO2 17 (*)     Glucose 86      BUN 6      Creatinine 0.6      Calcium 9.1      Total Protein 7.2      Albumin 2.9 (*)     Total Bilirubin 0.3      Alkaline Phosphatase 119      AST 18      ALT 20      eGFR >60      Anion Gap 9     CBC W/ AUTO DIFFERENTIAL - Abnormal    WBC 12.26      RBC 3.81 (*)     Hemoglobin 11.4 (*)     Hematocrit 34.4 (*)     MCV 90      MCH 29.9      MCHC 33.1      RDW 13.0      Platelets 313      MPV 9.5      Immature Granulocytes 0.9 (*)     Gran # (ANC) 9.5 (*)     Immature Grans (Abs) 0.11 (*)     Lymph # 1.5      Mono # 1.0      Eos # 0.1      Baso # 0.04      nRBC 0      Gran % 77.7 (*)     Lymph % 12.2 (*)     Mono % 8.2      Eosinophil % 0.7      Basophil % 0.3      Differential Method Automated     TREPONEMA PALLIDIUM ANTIBODIES IGG, IGM    Treponema Pallidum Antibodies (IgG, IgM) Nonreactive     HIV 1 / 2 ANTIBODY   HEPATITIS B SURFACE ANTIGEN   TYPE & SCREEN    Group & Rh O POS      Indirect [place] NEG      Specimen Outdate 03/09/2025 23:59             Imaging Results    None          Medications   lactated ringers bolus 1,000 mL (has no administration in time range)   lactated ringers bolus 500 mL (has no administration in time range)   lactated ringers infusion (has no administration in time range)   0.9% NaCl infusion (has no administration in time range)   oxytocin 30 units/500 mL (60 milliunits/mL) in 0.9% NaCl IV bolus from bag (has no administration in time range)   ondansetron disintegrating tablet 8 mg (has no administration in time range)   calcium carbonate 200 mg calcium (500 mg) chewable tablet 500 mg (has no administration in time range)   simethicone chewable tablet 80 mg (has no administration in time range)   LIDOcaine HCL 10 mg/ml (1%) injection 10 mL (has no administration in time range)   azithromycin (ZITHROMAX) 500 mg in 0.9% NaCl 250 mL IVPB (admixture device) (has no administration in time range)   ceFAZolin (ANCEF) 3 gram in dextrose 5% IVPB (has no administration in time range)   famotidine (PF) injection 20 mg (20 mg Intravenous Given 3/6/25 1323)   misoprostol split tablet 50 mcg (50 mcg Oral Given 3/6/25 1533)     Medical Decision Making  Bozena Queen is a 34 y.o.  at 37w0d who presents to the OB ED today (2025) with CC of elevated blood pressure.      Pulse:  [71-92] 90  SpO2:  [97 %-98 %] 98 %  BP: (138-158)/() 138/60    gHTN  - Patient has had 2 elevated pressures in clinic and meets criteria today  - Asymptomatic. Mild SOB and LE edema.   - Pressure in ED mild-range  - Pre-E: Plt 313, Ast/ALT 18/20, Cr 0.6. No new PC ratio, will order.  - Counseled patient on recommendation to stay for IOL   - Consents in chart, US: vtx  - Send to L&D    Karli Kauffman MD  OBGYN   PGY-1      Amount and/or Complexity of Data Reviewed  Labs: ordered.    Risk  OTC drugs.  Prescription drug management.              Attending Attestation:   Physician Attestation Statement for Resident:  As the supervising MD   Physician Attestation  Statement: I have personally seen and examined this patient.   I agree with the above history.  -:   As the supervising MD I agree with the above PE.     As the supervising MD I agree with the above treatment, course, plan, and disposition.   I was personally present during the entire procedure.  I have reviewed and agree with the residents interpretation of the following: lab data.  I have reviewed the following: old records at this facility.                                       Clinical Impression:  Final diagnoses:  [O13.3] Gestational hypertension, third trimester (Primary)  [Z3A.37] 37 weeks gestation of pregnancy          ED Disposition Condition    Send to L&D                   Karli Kauffman MD  Resident  03/06/25 1621         [1]   Social History  Tobacco Use    Smoking status: Never     Passive exposure: Never    Smokeless tobacco: Never   Substance Use Topics    Alcohol use: Not Currently     Alcohol/week: 2.0 standard drinks of alcohol     Types: 2 Glasses of wine per week    Drug use: No        Anai Arnold MD  03/06/25 3238

## 2025-03-06 NOTE — H&P
HISTORY AND PHYSICAL                                                OBSTETRICS          Subjective:       Bozena Queen is a 34 y.o.  female with IUP at 37w0d weeks gestation who presents for IOL 2/2 gHTN.    Patient met criteria for gHTN today in clinic and came in through the [facility]. She reports mild bilateral ankle swelling. No headache, SOB, RUQ pain or changes in vision.     Patient denies contractions, denies vaginal bleeding, denies LOF.   Fetal Movement: normal.    This IUP is complicated by gHTN, MO (pp BMI 43), AMA.    Review of Systems   Constitutional:  Negative for chills and fever.   Respiratory:  Negative for shortness of breath.    Cardiovascular:  Negative for chest pain.   Gastrointestinal:  Negative for abdominal pain, diarrhea, nausea and vomiting.   Genitourinary:  Negative for dysuria, hematuria, vaginal bleeding, vaginal discharge and vaginal pain.   Neurological:  Negative for headaches.       PMHx:   Past Medical History:   Diagnosis Date    Obesity (BMI 30-39.9) 2019    RUQ abdominal pain 2019       PSHx: No past surgical history on file.    All: Review of patient's allergies indicates:  No Known Allergies    Meds: Prescriptions Prior to Admission[1]    SH: Social History[2]    FH:   Family History   Problem Relation Name Age of Onset    Cancer Mother      Hypertension Mother      No Known Problems Sister      No Known Problems Brother         OBHx:   OB History    Para Term  AB Living   1 0 0 0 0 0   SAB IAB Ectopic Multiple Live Births   0 0 0 0 0      # Outcome Date GA Lbr Rakan/2nd Weight Sex Type Anes PTL Lv   1 Current                Objective:       BP (!) 139/95   Pulse 92   LMP 2024   SpO2 97%     Vitals:    25 1040 25 1050 25 1110   BP: 138/83 139/85 (!) 139/95   Pulse: 81 88 92   SpO2: 98% 98% 97%       General:   alert, appears stated age and cooperative, no apparent distress   HENT:  normocephalic, atraumatic   Eyes:   extraocular movements and conjunctivae normal   Neck:  supple, range of motion normal, no thyromegaly   Lungs:   no respiratory distress   Heart:   regular rate   Abdomen:  soft, non-tender, non-distended but gravid, no rebound or guarding    Extremities positive trace bilateral edema, negative erythema   FHT: q40, moderate jodi +accels, -decels;  Cat 1 (reassuring)                 TOCO: Quiet   Presentations: cephalic by ultrasound   Cervix:     Dilation: 1cm    Effacement: 50%    Station:  -3    Consistency: medium    Position: middle   EFW by Leopold's: 7lbs    Recent Growth Scan: Last growth: 32w6d FW (2283 g) plotting at the 40% and the AC plotting at the 74%    Lab Review  Blood Type O POS  GBBS: negative  Rubella: Immune  Trep: negative  HIV: negative  HepB: negative       Assessment:       37w0d weeks gestation with IOL 2/2 gHTN.    Active Hospital Problems    Diagnosis  POA    *Encounter for induction of labor [Z34.90]  Not Applicable      Resolved Hospital Problems   No resolved problems to display.          Plan:      Risks, benefits, alternatives and possible complications have been discussed in detail with the patient.   - Consents signed and to chart  - Admit to Labor and Delivery unit  - Plan for fontana + cytotec > pitocin when appropriate  - Epidural per Anesthesia  - Draw CBC, T&S  - Notify Staff  - Recheck in 2-4 hrs or PRN  - EFW - 32w6d FW (2283 g) plotting at the 40% and the AC plotting at the 74%  - Maternal pelvis - not proven!    gHTN  - BP: (138-158)/() 138/60  - asymptomatic  - preE labs as above  - Mag: not indicated  - Hypertensive agent - none indicated    Obesity  - PrePreg BMI 43  - Encourage ambulation  - Lovenox: indicated post partum    AMA   - NIPT negative  - Encourage ambulation    Post-Partum Hemorrhage risk - low    Karli Kauffman MD  OBGYN   PGY-1           [1] (Not in a hospital admission)  [2]   Social History  Socioeconomic History    Marital status:    Tobacco  Use    Smoking status: Never     Passive exposure: Never    Smokeless tobacco: Never   Substance and Sexual Activity    Alcohol use: Not Currently     Alcohol/week: 2.0 standard drinks of alcohol     Types: 2 Glasses of wine per week    Drug use: No    Sexual activity: Yes     Partners: Male     Birth control/protection: None

## 2025-03-06 NOTE — ANESTHESIA PREPROCEDURE EVALUATION
[facility]  Anesthesia Pre-Operative Evaluation         Patient Name: Bozena Queen  YOB: 1990  MRN: [id number]    2025      Bozena Queen is a 34 y.o. female  at 37w0d who presents for gHTN with 3x elevated BP, presenting for IOL.    She denies previous neuraxial anesthesia.       She denies history of HTN, asthma, bleeding or coagulation disorders, spine abnormalities, or previous back surgeries.      OB History    Para Term  AB Living   1        SAB IAB Ectopic Multiple Live Births             # Outcome Date GA Lbr Rakan/2nd Weight Sex Type Anes PTL Lv   1 Current                Review of patient's allergies indicates:  No Known Allergies    Wt Readings from Last 1 Encounters:   25 0909 128.5 kg (283 lb 4.7 oz)       BP Readings from Last 3 Encounters:   25 138/60   25 (!) 148/100   25 108/80       Problem List[1]    No past surgical history on file.    Tobacco Use: Low Risk  (3/6/2025)    Patient History     Smoking Tobacco Use: Never     Smokeless Tobacco Use: Never     Passive Exposure: Never     Alcohol Use: Not At Risk (2018)    AUDIT-C     Frequency of Alcohol Consumption: 2-4 times a month     Average Number of Drinks: 1 or 2     Frequency of Binge Drinking: Never     Social History     Substance and Sexual Activity   Drug Use No         Chemistry        Component Value Date/Time     (L) 2025 1052    K 3.8 2025 1052     2025 1052    CO2 17 (L) 2025 1052    BUN 6 2025 1052    CREATININE 0.6 2025 1052    GLU 86 2025 1052        Component Value Date/Time    CALCIUM 9.1 2025 1052    ALKPHOS 119 2025 1052    AST 18 2025 1052    ALT 20 2025 1052    BILITOT 0.3 2025 1052    ESTGFRAFRICA >60.0 2021 1602    EGFRNONAA >60.0 2021 1602            Lab Results   Component Value Date    WBC 12.26 2025    HGB 11.4 (L) 2025    HCT 34.4  "(L) 03/06/2025     03/06/2025       No results found for: "LABPROT", "INR", "APTT"        Pre-op Assessment    I have reviewed the Patient Summary Reports.     I have reviewed the Nursing Notes. I have reviewed the NPO Status.   I have reviewed the Medications.     Review of Systems  Anesthesia Hx:               Denies Personal Hx of Anesthesia complications.                        Physical Exam  General: Well nourished, Cooperative, Alert and Oriented    Airway:  Mallampati: II   Mouth Opening: Normal  TM Distance: Normal  Neck ROM: Normal ROM    Dental:  Intact    Chest/Lungs:  Normal Respiratory Rate    Heart:  Rate: Normal  Rhythm: Regular Rhythm        Anesthesia Plan  Type of Anesthesia, risks & benefits discussed:    Anesthesia Type: Epidural  Intra-op Monitoring Plan: Standard ASA Monitors  Post Op Pain Control Plan: multimodal analgesia, epidural analgesia and intrathecal opioid  Induction:  IV and rapid sequence  Airway Plan: Video, Post-Induction  Informed Consent: Informed consent signed with the Patient and all parties understand the risks and agree with anesthesia plan.  All questions answered.   ASA Score: 3  Day of Surgery Review of History & Physical: H&P Update referred to the surgeon/provider.    Ready For Surgery From Anesthesia Perspective.     .           [1]   Patient Active Problem List  Diagnosis    Attention deficit hyperactivity disorder (ADHD), predominantly inattentive type    Class 3 severe obesity due to excess calories without serious comorbidity with body mass index (BMI) of 40.0 to 44.9 in adult    Amenorrhea    Encounter for induction of labor     "

## 2025-03-06 NOTE — PROGRESS NOTES
Here for routine OB appt at 37w0d, with no complaints.    PNT prior to visit today.  BP elevated, sustained x3. Denies HA, SOB, Chest pain, and visual disturbances.  Now meets criteria for GHTN.   Discussed diagnosis and potential for recommended delivery sooner than expected. Delivery recommended at 37w0d in setting of GHTN w/o severe range BPs. Primary OB currently out of town. Pt verbalizes understanding.  GBS negative   Reports good FM.    Denies LOF, denies VB, denies contractions.  Reviewed warning signs of Labor and Preeclampsia.  Daily FM counts reinforced.  Sent to [facility] for prolonged BP monitoring.

## 2025-03-06 NOTE — PATIENT INSTRUCTIONS
LABOR AND DELIVERY PHONE NUMBER, [phone] (OPEN 24/7, LOCATED ON 6TH FLOOR OF HOSPITAL)  [address] PHONE NUMBER, [phone] (OPEN MON-FRI, 8a-5p)

## 2025-03-07 PROBLEM — E66.9 OBESITY: Status: ACTIVE | Noted: 2023-01-24

## 2025-03-07 PROBLEM — Z34.90 ENCOUNTER FOR INDUCTION OF LABOR: Status: RESOLVED | Noted: 2025-03-06 | Resolved: 2025-03-07

## 2025-03-07 PROBLEM — O09.523 MULTIGRAVIDA OF ADVANCED MATERNAL AGE IN THIRD TRIMESTER: Status: ACTIVE | Noted: 2025-03-07

## 2025-03-07 PROBLEM — F41.9 ANXIETY: Status: ACTIVE | Noted: 2025-03-07

## 2025-03-07 PROBLEM — O13.3 GESTATIONAL HYPERTENSION, THIRD TRIMESTER: Status: ACTIVE | Noted: 2025-03-07

## 2025-03-07 PROCEDURE — 72100003 HC LABOR CARE, EA. ADDL. 8 HRS

## 2025-03-07 PROCEDURE — 25000003 PHARM REV CODE 250

## 2025-03-07 PROCEDURE — 0KQM0ZZ REPAIR PERINEUM MUSCLE, OPEN APPROACH: ICD-10-PCS | Performed by: OBSTETRICS & GYNECOLOGY

## 2025-03-07 PROCEDURE — 72200005 HC VAGINAL DELIVERY LEVEL II

## 2025-03-07 PROCEDURE — 63600175 PHARM REV CODE 636 W HCPCS

## 2025-03-07 PROCEDURE — 59400 OBSTETRICAL CARE: CPT | Mod: AT,,, | Performed by: OBSTETRICS & GYNECOLOGY

## 2025-03-07 PROCEDURE — 72100002 HC LABOR CARE, 1ST 8 HOURS

## 2025-03-07 PROCEDURE — 11000001 HC ACUTE MED/SURG PRIVATE ROOM

## 2025-03-07 RX ORDER — ONDANSETRON 8 MG/1
8 TABLET, ORALLY DISINTEGRATING ORAL EVERY 8 HOURS PRN
Status: DISCONTINUED | OUTPATIENT
Start: 2025-03-07 | End: 2025-03-08

## 2025-03-07 RX ORDER — SIMETHICONE 80 MG
1 TABLET,CHEWABLE ORAL EVERY 6 HOURS PRN
Status: DISCONTINUED | OUTPATIENT
Start: 2025-03-07 | End: 2025-03-09 | Stop reason: HOSPADM

## 2025-03-07 RX ORDER — DIPHENOXYLATE HYDROCHLORIDE AND ATROPINE SULFATE 2.5; .025 MG/1; MG/1
2 TABLET ORAL EVERY 6 HOURS PRN
Status: DISCONTINUED | OUTPATIENT
Start: 2025-03-07 | End: 2025-03-09 | Stop reason: HOSPADM

## 2025-03-07 RX ORDER — IBUPROFEN 600 MG/1
600 TABLET ORAL EVERY 6 HOURS
Status: DISCONTINUED | OUTPATIENT
Start: 2025-03-07 | End: 2025-03-09 | Stop reason: HOSPADM

## 2025-03-07 RX ORDER — TRANEXAMIC ACID 10 MG/ML
1000 INJECTION, SOLUTION INTRAVENOUS EVERY 30 MIN PRN
Status: DISCONTINUED | OUTPATIENT
Start: 2025-03-07 | End: 2025-03-07

## 2025-03-07 RX ORDER — DIPHENHYDRAMINE HCL 25 MG
25 CAPSULE ORAL EVERY 4 HOURS PRN
Status: DISCONTINUED | OUTPATIENT
Start: 2025-03-07 | End: 2025-03-09 | Stop reason: HOSPADM

## 2025-03-07 RX ORDER — METHYLERGONOVINE MALEATE 0.2 MG/ML
200 INJECTION INTRAVENOUS ONCE AS NEEDED
Status: DISCONTINUED | OUTPATIENT
Start: 2025-03-07 | End: 2025-03-07

## 2025-03-07 RX ORDER — HYDROCODONE BITARTRATE AND ACETAMINOPHEN 10; 325 MG/1; MG/1
1 TABLET ORAL EVERY 4 HOURS PRN
Status: DISCONTINUED | OUTPATIENT
Start: 2025-03-07 | End: 2025-03-09 | Stop reason: HOSPADM

## 2025-03-07 RX ORDER — OXYTOCIN-SODIUM CHLORIDE 0.9% IV SOLN 30 UNIT/500ML 30-0.9/5 UT/ML-%
95 SOLUTION INTRAVENOUS CONTINUOUS PRN
Status: DISCONTINUED | OUTPATIENT
Start: 2025-03-07 | End: 2025-03-09 | Stop reason: HOSPADM

## 2025-03-07 RX ORDER — CARBOPROST TROMETHAMINE 250 UG/ML
250 INJECTION, SOLUTION INTRAMUSCULAR
Status: DISCONTINUED | OUTPATIENT
Start: 2025-03-07 | End: 2025-03-09 | Stop reason: HOSPADM

## 2025-03-07 RX ORDER — OXYTOCIN 10 [USP'U]/ML
10 INJECTION, SOLUTION INTRAMUSCULAR; INTRAVENOUS ONCE AS NEEDED
Status: DISCONTINUED | OUTPATIENT
Start: 2025-03-07 | End: 2025-03-09 | Stop reason: HOSPADM

## 2025-03-07 RX ORDER — FENTANYL/BUPIVACAINE/NS/PF 2MCG/ML-.1
PLASTIC BAG, INJECTION (ML) INJECTION
Status: COMPLETED
Start: 2025-03-07 | End: 2025-03-07

## 2025-03-07 RX ORDER — OXYTOCIN-SODIUM CHLORIDE 0.9% IV SOLN 30 UNIT/500ML 30-0.9/5 UT/ML-%
10 SOLUTION INTRAVENOUS ONCE AS NEEDED
Status: DISCONTINUED | OUTPATIENT
Start: 2025-03-07 | End: 2025-03-07

## 2025-03-07 RX ORDER — OXYTOCIN-SODIUM CHLORIDE 0.9% IV SOLN 30 UNIT/500ML 30-0.9/5 UT/ML-%
SOLUTION INTRAVENOUS
Status: DISPENSED
Start: 2025-03-07 | End: 2025-03-08

## 2025-03-07 RX ORDER — MISOPROSTOL 200 UG/1
800 TABLET ORAL ONCE AS NEEDED
Status: DISCONTINUED | OUTPATIENT
Start: 2025-03-07 | End: 2025-03-07

## 2025-03-07 RX ORDER — OXYTOCIN-SODIUM CHLORIDE 0.9% IV SOLN 30 UNIT/500ML 30-0.9/5 UT/ML-%
95 SOLUTION INTRAVENOUS CONTINUOUS PRN
Status: DISCONTINUED | OUTPATIENT
Start: 2025-03-07 | End: 2025-03-07

## 2025-03-07 RX ORDER — METHYLERGONOVINE MALEATE 0.2 MG/ML
200 INJECTION INTRAVENOUS ONCE AS NEEDED
Status: DISCONTINUED | OUTPATIENT
Start: 2025-03-07 | End: 2025-03-09 | Stop reason: HOSPADM

## 2025-03-07 RX ORDER — DOCUSATE SODIUM 100 MG/1
200 CAPSULE, LIQUID FILLED ORAL 2 TIMES DAILY PRN
Status: DISCONTINUED | OUTPATIENT
Start: 2025-03-07 | End: 2025-03-08

## 2025-03-07 RX ORDER — OXYTOCIN-SODIUM CHLORIDE 0.9% IV SOLN 30 UNIT/500ML 30-0.9/5 UT/ML-%
10 SOLUTION INTRAVENOUS ONCE AS NEEDED
Status: COMPLETED | OUTPATIENT
Start: 2025-03-07 | End: 2025-03-07

## 2025-03-07 RX ORDER — MISOPROSTOL 200 UG/1
800 TABLET ORAL ONCE AS NEEDED
Status: DISCONTINUED | OUTPATIENT
Start: 2025-03-07 | End: 2025-03-09 | Stop reason: HOSPADM

## 2025-03-07 RX ORDER — HYDROCORTISONE 25 MG/G
CREAM TOPICAL 3 TIMES DAILY PRN
Status: DISCONTINUED | OUTPATIENT
Start: 2025-03-07 | End: 2025-03-09 | Stop reason: HOSPADM

## 2025-03-07 RX ORDER — MISOPROSTOL 200 UG/1
800 TABLET ORAL ONCE AS NEEDED
Status: COMPLETED | OUTPATIENT
Start: 2025-03-07 | End: 2025-03-07

## 2025-03-07 RX ORDER — DIPHENOXYLATE HYDROCHLORIDE AND ATROPINE SULFATE 2.5; .025 MG/1; MG/1
2 TABLET ORAL EVERY 6 HOURS PRN
Status: DISCONTINUED | OUTPATIENT
Start: 2025-03-07 | End: 2025-03-07

## 2025-03-07 RX ORDER — DIPHENHYDRAMINE HYDROCHLORIDE 50 MG/ML
25 INJECTION, SOLUTION INTRAMUSCULAR; INTRAVENOUS EVERY 4 HOURS PRN
Status: DISCONTINUED | OUTPATIENT
Start: 2025-03-07 | End: 2025-03-09 | Stop reason: HOSPADM

## 2025-03-07 RX ORDER — CARBOPROST TROMETHAMINE 250 UG/ML
250 INJECTION, SOLUTION INTRAMUSCULAR
Status: DISCONTINUED | OUTPATIENT
Start: 2025-03-07 | End: 2025-03-07

## 2025-03-07 RX ORDER — TRANEXAMIC ACID 10 MG/ML
1000 INJECTION, SOLUTION INTRAVENOUS EVERY 30 MIN PRN
Status: DISCONTINUED | OUTPATIENT
Start: 2025-03-07 | End: 2025-03-09 | Stop reason: HOSPADM

## 2025-03-07 RX ORDER — HYDROCODONE BITARTRATE AND ACETAMINOPHEN 5; 325 MG/1; MG/1
1 TABLET ORAL EVERY 4 HOURS PRN
Status: DISCONTINUED | OUTPATIENT
Start: 2025-03-07 | End: 2025-03-09 | Stop reason: HOSPADM

## 2025-03-07 RX ORDER — DIPHENHYDRAMINE HYDROCHLORIDE 50 MG/ML
25 INJECTION, SOLUTION INTRAMUSCULAR; INTRAVENOUS ONCE
Status: COMPLETED | OUTPATIENT
Start: 2025-03-07 | End: 2025-03-07

## 2025-03-07 RX ORDER — OXYTOCIN-SODIUM CHLORIDE 0.9% IV SOLN 30 UNIT/500ML 30-0.9/5 UT/ML-%
95 SOLUTION INTRAVENOUS ONCE AS NEEDED
Status: DISCONTINUED | OUTPATIENT
Start: 2025-03-07 | End: 2025-03-07

## 2025-03-07 RX ORDER — SODIUM CHLORIDE 0.9 % (FLUSH) 0.9 %
10 SYRINGE (ML) INJECTION
Status: DISCONTINUED | OUTPATIENT
Start: 2025-03-07 | End: 2025-03-09 | Stop reason: HOSPADM

## 2025-03-07 RX ORDER — OXYTOCIN-SODIUM CHLORIDE 0.9% IV SOLN 30 UNIT/500ML 30-0.9/5 UT/ML-%
95 SOLUTION INTRAVENOUS ONCE AS NEEDED
Status: DISCONTINUED | OUTPATIENT
Start: 2025-03-07 | End: 2025-03-09 | Stop reason: HOSPADM

## 2025-03-07 RX ORDER — OXYTOCIN 10 [USP'U]/ML
10 INJECTION, SOLUTION INTRAMUSCULAR; INTRAVENOUS ONCE AS NEEDED
Status: DISCONTINUED | OUTPATIENT
Start: 2025-03-07 | End: 2025-03-07

## 2025-03-07 RX ORDER — ACETAMINOPHEN 325 MG/1
650 TABLET ORAL EVERY 6 HOURS SCHEDULED
Status: DISCONTINUED | OUTPATIENT
Start: 2025-03-07 | End: 2025-03-09 | Stop reason: HOSPADM

## 2025-03-07 RX ORDER — PRENATAL WITH FERROUS FUM AND FOLIC ACID 3080; 920; 120; 400; 22; 1.84; 3; 20; 10; 1; 12; 200; 27; 25; 2 [IU]/1; [IU]/1; MG/1; [IU]/1; MG/1; MG/1; MG/1; MG/1; MG/1; MG/1; UG/1; MG/1; MG/1; MG/1; MG/1
1 TABLET ORAL DAILY
Status: DISCONTINUED | OUTPATIENT
Start: 2025-03-07 | End: 2025-03-09 | Stop reason: HOSPADM

## 2025-03-07 RX ADMIN — ONDANSETRON 8 MG: 8 TABLET, ORALLY DISINTEGRATING ORAL at 07:03

## 2025-03-07 RX ADMIN — ACETAMINOPHEN 650 MG: 325 TABLET, FILM COATED ORAL at 09:03

## 2025-03-07 RX ADMIN — IBUPROFEN 600 MG: 600 TABLET ORAL at 04:03

## 2025-03-07 RX ADMIN — ACETAMINOPHEN 650 MG: 325 TABLET, FILM COATED ORAL at 04:03

## 2025-03-07 RX ADMIN — FENTANYL CITRATE 10 ML/HR: 50 INJECTION INTRAMUSCULAR; INTRAVENOUS at 10:03

## 2025-03-07 RX ADMIN — OXYTOCIN-SODIUM CHLORIDE 0.9% IV SOLN 30 UNIT/500ML 10 UNITS: 30-0.9/5 SOLUTION at 12:03

## 2025-03-07 RX ADMIN — DIPHENHYDRAMINE HYDROCHLORIDE 25 MG: 50 INJECTION, SOLUTION INTRAMUSCULAR; INTRAVENOUS at 03:03

## 2025-03-07 RX ADMIN — MISOPROSTOL 800 MCG: 200 TABLET ORAL at 12:03

## 2025-03-07 RX ADMIN — IBUPROFEN 600 MG: 600 TABLET ORAL at 09:03

## 2025-03-07 NOTE — PROGRESS NOTES
LABOR NOTE (1955)    S:  Complaints: No.  Epidural working:  yes  Resident to bedside for AROM  O: /74   Pulse 85   Temp 97.7 °F (36.5 °C) (Oral)   Resp 19   LMP 06/20/2024   SpO2 98%   Breastfeeding No     FHT: 135 bpm, mod jodi +accel -decel Cat 1 (reassuring)  CTX: q2-3 minutes  SVE: 2/50/-3    1500: fontana placed, cytotec given 1530  1950: 2/50/-3, fontana in place, will start pitocin.  2130: 3/60/-2, fontana out AROM Clr    PLAN:  Continue Close Maternal/Fetal Monitoring  Pitocin Augmentation per protocol  Recheck 2 hours or PRN    Amparo Lunsford MD PGY-3  Obstetrics and Gynecology  [facility]

## 2025-03-07 NOTE — PROGRESS NOTES
LABOR NOTE (1956)    S:  Complaints: No.  Epidural working:  not applicable    O: BP (!) 144/86   Pulse 91   Temp 97.7 °F (36.5 °C) (Oral)   Resp 19   LMP 06/20/2024   SpO2 100%   Breastfeeding No     FHT: 135 bpm, mod jodi +accel -decel Cat 1 (reassuring)  CTX: irregular  SVE: 2/50/-3    1500: fontana placed, cytotec given 1530  1950: 2/50/-3, fontana in place, will start pitocin.    PLAN:  Patient plans to get epidural   Continue Close Maternal/Fetal Monitoring  Pitocin Augmentation per protocol  Recheck 2 hours or PRN    Vincent Deutsch MD  Obstetrics and Gynecology- PGY2

## 2025-03-07 NOTE — L&D DELIVERY NOTE
"[facility] - Labor & Delivery  Vaginal Delivery   Operative Note    SUMMARY     Normal spontaneous vaginal delivery of live infant, after 1 hour of excellent maternal effort. The infant was placed on mothers abdomen for skin to skin and bulb suctioning.     Infant delivered position OA over intact perineum.  Nuchal cord: No.    Spontaneous delivery of placenta and IV pitocin given noting good uterine tone.  2nd degree laceration noted and repaired in normal fashion with 2-0 and 3-0 Vicryl .    Patient tolerated delivery well. Sponge needle and lap counted correctly x2.    EBL: 300mL    Karli Kauffman MD  OBGYN   PGY-1      Indications: Encounter for induction of labor  Pregnancy complicated by: Problem List[1]  Admitting GA: 37w1d    Delivery Information for Christiane Queen    Birth information:  YOB: 2026   Time of birth: 12:02 PM   Sex: female   Head Delivery Date/Time: 3/7/2025 12:02 PM   Delivery type: Vaginal, Spontaneous   Gestational Age: 37w1d       Delivery Providers    Delivering clinician: Anita Stovall MD   Provider Role    Karli Kauffman MD Resident    Lior Reddy, RN Registered Nurse    Dianelys Wolf RN Registered Nurse    Lupis Miller RN Nurse Manager              Measurements    Weight: 3460 g  Weight (lbs): 7 lb 10.1 oz  Length: 50.8 cm  Length (in): 20"  Head circumference: 36.2 cm  Chest circumference: 33.7 cm         Apgars    Living status: Living  Apgar Component Scores:  1 min.:  5 min.:  10 min.:  15 min.:  20 min.:    Skin color:  1  1       Heart rate:  2  2       Reflex irritability:  2  2       Muscle tone:  2  2       Respiratory effort:  2  2       Total:  9  9       Apgars assigned by: JAKUB WOLF [facility]         Operative Delivery    Forceps attempted?: No  Vacuum extractor attempted?: No         Shoulder Dystocia    Shoulder dystocia present?: No           Presentation    Presentation: Vertex  Position: Occiput Anterior   "         Interventions/Resuscitation    Method: Bulb Suctioning, Tactile Stimulation       Cord    Vessels: 3 vessels  Complications: None  Delayed Cord Clamping?: Yes  Cord Clamped Date/Time: 3/7/2025 12:04 PM  Cord Blood Disposition: Sent with Baby  Gases Sent?: No  Stem Cell Collection (by MD): No       Placenta    Placenta delivery date/time: 3/7/2025 12:06  Placenta removal: Spontaneous  Placenta appearance: Intact  Placenta disposition: Donation           Labor Events:       labor: No     Labor Onset Date/Time:         Dilation Complete Date/Time: 2025 09:53     Start Pushing Date/Time: 2025 11:33       Start Pushing Date/Time: 2025 11:33     Rupture Date/Time: 25        Rupture type: ARM (Artificial Rupture)        Fluid Amount:       Fluid Color: Clear              steroids: None     Antibiotics given for GBS: No     Induction: misoprostol     Indications for induction:  Hypertension     Augmentation: oxytocin     Indications for augmentation: Ineffective Contraction Pattern     Labor complications: None     Additional complications:          Cervical ripening:                     Delivery:      Episiotomy: None     Indication for Episiotomy:       Perineal Lacerations: 2nd Repaired:  Yes   Periurethral Laceration:   Repaired:     Labial Laceration:   Repaired:     Sulcus Laceration:   Repaired:     Vaginal Laceration:   Repaired:     Cervical Laceration:   Repaired:     Repair suture:       Repair # of packets: 3     Last Value - EBL - Nursing (mL):       Sum - EBL - Nursing (mL): 0     Last Value - EBL - Anesthesia (mL):      Calculated QBL (mL): 704     Running total QBL (mL): 704     Vaginal Sweep Performed: Yes     Surgicount Correct: Yes     Vaginal Packing: No Quantity:       Other providers:       Anesthesia    Method: Epidural          Details (if applicable):  Trial of Labor      Categorization:      Priority:     Indications  for :     Incision Type:       Additional  information:  Forceps:    Vacuum:    Breech:    Observed anomalies    Other (Comments):              [1]   Patient Active Problem List  Diagnosis    Attention deficit hyperactivity disorder (ADHD), predominantly inattentive type    Obesity    Amenorrhea    Gestational hypertension, third trimester    Multigravida of advanced maternal age in third trimester     (spontaneous vaginal delivery)    Anxiety

## 2025-03-07 NOTE — ANESTHESIA PROCEDURE NOTES
Epidural    Patient location during procedure: OB   Reason for block: primary anesthetic   Reason for block: labor analgesia requested by patient and obstetrician  Diagnosis: IUP   Start time: 3/6/2025 8:10 PM  Timeout: 3/6/2025 8:08 PM  End time: 3/6/2025 8:40 PM  Surgery related to: Vaginal Delivery    Staffing  Performing Provider: Frederic Garcia DO  Authorizing Provider: Leticia Walker MD    Staffing  Performed by: Frederic Garcia DO  Authorized by: Leticia Walker MD        Preanesthetic Checklist  Completed: patient identified, IV checked, site marked, risks and benefits discussed, surgical consent, monitors and equipment checked, pre-op evaluation, timeout performed, anesthesia consent given, hand hygiene performed and patient being monitored  Preparation  Patient position: sitting  Prep: ChloraPrep  Patient monitoring: Pulse Ox  Reason for block: primary anesthetic   Epidural  Skin Anesthetic: lidocaine 1%  Skin Wheal: 3 mL  Administration type: continuous  Approach: midline  Interspace: L3-4    Injection technique: CASSI air  Needle and Epidural Catheter  Needle type: Tuohy   Needle gauge: 18  Needle length: 5.0 inches  Needle insertion depth: 10 cm  Catheter type: Tivix  Catheter size: 19 G  Catheter at skin depth: 15.5 cm  Insertion Attempts: 1  Test dose: 3 mL of lidocaine 1.5% with Epi 1-to-200,000  Additional Documentation: incremental injection, negative aspiration for heme and CSF, no paresthesia on injection, no signs/symptoms of IV or SA injection, no significant pain on injection and no significant complaints from patient  Needle localization: anatomical landmarks  Medications:  Volume per aspiration: 5 mL  Time between aspirations: 5 minutes   Assessment  Ease of block: moderate  Patient's tolerance of the procedure: comfortable throughout block and no complaints  Additional Notes  Placement complicated by obesity.  No inadvertent dural puncture with Tuohy.  Dural puncture  performed with spinal needle.

## 2025-03-07 NOTE — PLAN OF CARE
Problem:  Fall Injury Risk  Goal: Absence of Fall, Infant Drop and Related Injury  Outcome: Progressing     Problem: Adult Inpatient Plan of Care  Goal: Plan of Care Review  Outcome: Progressing  Goal: Patient-Specific Goal (Individualized)  Outcome: Progressing  Goal: Absence of Hospital-Acquired Illness or Injury  Outcome: Progressing  Goal: Optimal Comfort and Wellbeing  Outcome: Progressing  Goal: Readiness for Transition of Care  Outcome: Progressing     Problem: Bariatric Environmental Safety  Goal: Safety Maintained with Care  Outcome: Progressing     Problem: Infection  Goal: Absence of Infection Signs and Symptoms  Outcome: Progressing     Problem: Skin Injury Risk Increased  Goal: Skin Health and Integrity  Outcome: Progressing     Problem: Anesthesia/Analgesia, Neuraxial  Goal: Effective Urinary Elimination  Outcome: Progressing     Problem: Postpartum (Vaginal Delivery)  Goal: Successful Parent Role Transition  Outcome: Progressing  Goal: Hemostasis  Outcome: Progressing  Goal: Absence of Infection Signs and Symptoms  Outcome: Progressing  Goal: Anesthesia/Sedation Recovery  Outcome: Progressing  Goal: Optimal Pain Control and Function  Outcome: Progressing  Goal: Effective Urinary Elimination  Outcome: Progressing     Problem: Breastfeeding  Goal: Effective Breastfeeding  Outcome: Progressing

## 2025-03-07 NOTE — PROGRESS NOTES
Late entry ~1830    Social rounds, introduced myself and roll as OB staff on call.   Any questions were answered.     FHR Cat 1    Jeannette Rivera MD  Obstetrics & Gynecology   [facility]

## 2025-03-07 NOTE — PLAN OF CARE
Problem: Labor  Goal: Hemostasis  Outcome: Progressing  Goal: Stable Fetal Wellbeing  Outcome: Progressing  Goal: Effective Progression to Delivery  Outcome: Progressing  Goal: Absence of Infection Signs and Symptoms  Outcome: Progressing  Goal: Acceptable Pain Control  Outcome: Progressing  Goal: Normal Uterine Contraction Pattern  Outcome: Progressing     Problem: Anesthesia/Analgesia, Neuraxial  Goal: Safe, Effective Infusion Delivery  Outcome: Progressing  Goal: Stable Patient-Fetal Status  Outcome: Progressing  Goal: Absence of Infection Signs and Symptoms  Outcome: Progressing  Goal: Nausea and Vomiting Relief  Outcome: Progressing  Goal: Effective Pain Control  Outcome: Progressing  Goal: Effective Oxygenation and Ventilation  Outcome: Progressing  Goal: Baseline Motor Function Return  Outcome: Progressing  Goal: Effective Urinary Elimination  Outcome: Progressing

## 2025-03-07 NOTE — PROGRESS NOTES
03/06/25 2008   TeleStshawn Buck Note - Strip   Strip Reviewed by Heber Nurse? Yes   TeleStork [facility] Note - Communication   Aylett Nurse Communicated with Bedside Nurse Regarding: Fetal Status   TeleStshawn Buck Note - Notification   Nurse Notified? Yes

## 2025-03-07 NOTE — PROGRESS NOTES
LABOR NOTE    S:  MD to bedside for routine cervical exam. Epidural working:  yes    O: /67   Pulse 69   Temp 97.8 °F (36.6 °C) (Oral)   Resp 19   LMP 2024   SpO2 99%   Breastfeeding No     FHT: Cat 1   CTX: q 2 minutes  SVE: 10100/+1      ASSESSMENT:  Final Active Diagnoses:    Diagnosis Date Noted POA    PRINCIPAL PROBLEM:  Encounter for induction of labor [Z34.90] 2025 Not Applicable    Gestational hypertension, third trimester [O13.3] 2025 Yes    Multigravida of advanced maternal age in third trimester [O09.523] 2025 Yes    Obesity [E66.9] 2023 Yes      Problems Resolved During this Admission:       PLAN:  Will set up to push  Continue Close Maternal/Fetal Monitoring  Anticipate      Beatris Blank MD  OB/GYN PGY-2

## 2025-03-08 PROBLEM — O09.523 MULTIGRAVIDA OF ADVANCED MATERNAL AGE IN THIRD TRIMESTER: Status: RESOLVED | Noted: 2025-03-07 | Resolved: 2025-03-08

## 2025-03-08 PROBLEM — O13.9 GESTATIONAL HYPERTENSION: Status: ACTIVE | Noted: 2025-03-08

## 2025-03-08 PROBLEM — O13.9 GESTATIONAL HYPERTENSION: Status: RESOLVED | Noted: 2025-03-08 | Resolved: 2025-03-08

## 2025-03-08 LAB
BASOPHILS # BLD AUTO: 0.09 K/UL (ref 0–0.2)
BASOPHILS NFR BLD: 0.4 % (ref 0–1.9)
DIFFERENTIAL METHOD BLD: ABNORMAL
EOSINOPHIL # BLD AUTO: 0.2 K/UL (ref 0–0.5)
EOSINOPHIL NFR BLD: 0.9 % (ref 0–8)
ERYTHROCYTE [DISTWIDTH] IN BLOOD BY AUTOMATED COUNT: 13.2 % (ref 11.5–14.5)
HCT VFR BLD AUTO: 30.1 % (ref 37–48.5)
HGB BLD-MCNC: 9.9 G/DL (ref 12–16)
IMM GRANULOCYTES # BLD AUTO: 0.21 K/UL (ref 0–0.04)
IMM GRANULOCYTES NFR BLD AUTO: 1 % (ref 0–0.5)
LYMPHOCYTES # BLD AUTO: 2.5 K/UL (ref 1–4.8)
LYMPHOCYTES NFR BLD: 12.4 % (ref 18–48)
MCH RBC QN AUTO: 30 PG (ref 27–31)
MCHC RBC AUTO-ENTMCNC: 32.9 G/DL (ref 32–36)
MCV RBC AUTO: 91 FL (ref 82–98)
MONOCYTES # BLD AUTO: 1.9 K/UL (ref 0.3–1)
MONOCYTES NFR BLD: 9.3 % (ref 4–15)
NEUTROPHILS # BLD AUTO: 15.4 K/UL (ref 1.8–7.7)
NEUTROPHILS NFR BLD: 76 % (ref 38–73)
NRBC BLD-RTO: 0 /100 WBC
PLATELET # BLD AUTO: 260 K/UL (ref 150–450)
PMV BLD AUTO: 9.6 FL (ref 9.2–12.9)
RBC # BLD AUTO: 3.3 M/UL (ref 4–5.4)
WBC # BLD AUTO: 20.26 K/UL (ref 3.9–12.7)

## 2025-03-08 PROCEDURE — 25000003 PHARM REV CODE 250

## 2025-03-08 PROCEDURE — 63600175 PHARM REV CODE 636 W HCPCS

## 2025-03-08 PROCEDURE — 85025 COMPLETE CBC W/AUTO DIFF WBC: CPT

## 2025-03-08 PROCEDURE — 36415 COLL VENOUS BLD VENIPUNCTURE: CPT

## 2025-03-08 PROCEDURE — 11000001 HC ACUTE MED/SURG PRIVATE ROOM

## 2025-03-08 RX ORDER — ACETAMINOPHEN 325 MG/1
650 TABLET ORAL EVERY 6 HOURS PRN
Qty: 30 TABLET | Refills: 0 | Status: SHIPPED | OUTPATIENT
Start: 2025-03-08

## 2025-03-08 RX ORDER — LANOLIN ALCOHOL/MO/W.PET/CERES
1 CREAM (GRAM) TOPICAL DAILY
Status: DISCONTINUED | OUTPATIENT
Start: 2025-03-08 | End: 2025-03-09 | Stop reason: HOSPADM

## 2025-03-08 RX ORDER — IBUPROFEN 600 MG/1
600 TABLET ORAL EVERY 8 HOURS PRN
Qty: 30 TABLET | Refills: 0 | Status: ON HOLD | OUTPATIENT
Start: 2025-03-08 | End: 2025-03-11 | Stop reason: HOSPADM

## 2025-03-08 RX ORDER — DOCUSATE SODIUM 100 MG/1
200 CAPSULE, LIQUID FILLED ORAL 2 TIMES DAILY PRN
Qty: 60 CAPSULE | Refills: 0 | Status: SHIPPED | OUTPATIENT
Start: 2025-03-08

## 2025-03-08 RX ORDER — FERROUS SULFATE 325(65) MG
325 TABLET, DELAYED RELEASE (ENTERIC COATED) ORAL DAILY
Qty: 30 TABLET | Refills: 0 | Status: ON HOLD | OUTPATIENT
Start: 2025-03-08 | End: 2025-03-11 | Stop reason: HOSPADM

## 2025-03-08 RX ORDER — ENOXAPARIN SODIUM 100 MG/ML
40 INJECTION SUBCUTANEOUS EVERY 12 HOURS
Status: DISCONTINUED | OUTPATIENT
Start: 2025-03-08 | End: 2025-03-09 | Stop reason: HOSPADM

## 2025-03-08 RX ORDER — PROMETHAZINE HYDROCHLORIDE 12.5 MG/1
12.5 TABLET ORAL EVERY 6 HOURS PRN
Status: DISCONTINUED | OUTPATIENT
Start: 2025-03-08 | End: 2025-03-09 | Stop reason: HOSPADM

## 2025-03-08 RX ORDER — DOCUSATE SODIUM 100 MG/1
200 CAPSULE, LIQUID FILLED ORAL 2 TIMES DAILY
Status: DISCONTINUED | OUTPATIENT
Start: 2025-03-08 | End: 2025-03-09 | Stop reason: HOSPADM

## 2025-03-08 RX ADMIN — FERROUS SULFATE TAB 325 MG (65 MG ELEMENTAL FE) 1 EACH: 325 (65 FE) TAB at 09:03

## 2025-03-08 RX ADMIN — DOCUSATE SODIUM 200 MG: 100 CAPSULE, LIQUID FILLED ORAL at 08:03

## 2025-03-08 RX ADMIN — PROMETHAZINE HYDROCHLORIDE 12.5 MG: 12.5 TABLET ORAL at 09:03

## 2025-03-08 RX ADMIN — IBUPROFEN 600 MG: 600 TABLET ORAL at 11:03

## 2025-03-08 RX ADMIN — ACETAMINOPHEN 650 MG: 325 TABLET, FILM COATED ORAL at 05:03

## 2025-03-08 RX ADMIN — IBUPROFEN 600 MG: 600 TABLET ORAL at 04:03

## 2025-03-08 RX ADMIN — IBUPROFEN 600 MG: 600 TABLET ORAL at 05:03

## 2025-03-08 RX ADMIN — ENOXAPARIN SODIUM 40 MG: 40 INJECTION SUBCUTANEOUS at 08:03

## 2025-03-08 RX ADMIN — ONDANSETRON 8 MG: 8 TABLET, ORALLY DISINTEGRATING ORAL at 03:03

## 2025-03-08 RX ADMIN — ACETAMINOPHEN 650 MG: 325 TABLET, FILM COATED ORAL at 04:03

## 2025-03-08 RX ADMIN — PRENATAL VIT W/ FE FUMARATE-FA TAB 27-0.8 MG 1 TABLET: 27-0.8 TAB at 09:03

## 2025-03-08 RX ADMIN — ENOXAPARIN SODIUM 40 MG: 40 INJECTION SUBCUTANEOUS at 09:03

## 2025-03-08 RX ADMIN — ACETAMINOPHEN 650 MG: 325 TABLET, FILM COATED ORAL at 11:03

## 2025-03-08 RX ADMIN — DOCUSATE SODIUM 200 MG: 100 CAPSULE, LIQUID FILLED ORAL at 09:03

## 2025-03-08 NOTE — SUBJECTIVE & OBJECTIVE
Objective:     Vital Signs (Most Recent):  Temp: 97.6 °F (36.4 °C) (03/08/25 1244)  Pulse: 78 (03/08/25 1244)  Resp: 16 (03/08/25 1244)  BP: 130/68 (03/08/25 1244)  SpO2: 100 % (03/08/25 1244) Vital Signs (24h Range):  Temp:  [97.5 °F (36.4 °C)-98.1 °F (36.7 °C)] 97.6 °F (36.4 °C)  Pulse:  [69-84] 78  Resp:  [16-18] 16  SpO2:  [95 %-100 %] 100 %  BP: (109-130)/(56-68) 130/68        There is no height or weight on file to calculate BMI.      Intake/Output Summary (Last 24 hours) at 3/8/2025 1705  Last data filed at 3/8/2025 0230  Gross per 24 hour   Intake --   Output 1600 ml   Net -1600 ml         Significant Labs:  Lab Results   Component Value Date    GROUPTRH O POS 03/06/2025    HEPBSAG Non-reactive 08/15/2024     Recent Labs   Lab 03/08/25  0409   HGB 9.9*   HCT 30.1*       CBC:   Recent Labs   Lab 03/08/25  0409   WBC 20.26*   RBC 3.30*   HGB 9.9*   HCT 30.1*      MCV 91   MCH 30.0   MCHC 32.9     I have personallly reviewed all pertinent lab results from the last 24 hours.

## 2025-03-08 NOTE — LACTATION NOTE
LC provided Pt education on behaviors of babies born near term and encouraged Pt to feed on cue, but not to exceed three hours. Pt explained that she intends to discharge at twenty-four hours is baby is cleared. Pt shared that baby has been inconsistent at the breast. LC reinforced behaviors of babies born near term. LC encouraged Pt to feed on cue, but not to exceed three hours. Pt to pump and supplement after each feeding. Lactation discharge education completed. Plan of care is for pt to follow basic breastfeeding education, frequent feeding based on baby's cues, but not to exceed three hours and to monitor baby's voids and stools. Breastfeeding guide, First Alert survey, resource list, and lactation warmline phone number reviewed. Pt to notify doctor for maternal or infant concerns, as reviewed with LC. Pt verbalizes understanding and questions answered.

## 2025-03-08 NOTE — DISCHARGE SUMMARY
[facility] ([place])  Obstetrics  Discharge Summary      Patient Name: Bozena Queen  MRN: [id number]  Admission Date: 3/6/2025  Hospital Length of Stay: 2 days  Discharge Date and Time:  2025 10:00 AM  Attending Physician: Rossy Miles MD   Discharging Provider: Maria M Mathews CNM   Primary Care Provider: Jon Mckay MD    HPI: No notes on file    * No surgery found *     Hospital Course:   34 year old  s/p  of I've infant girl at 37+ 1/7 weeks. 2nd degree laceration. Now PP day #1. She is doing well this morning. Gest HTN. Several elevated b/ps 140s/70s-80 prior to delivery. Only 1 elevation since delivery. No S&S of pre eclampsia. Normal labs with exception of mild acute blood loss Anemia, asymptomatic. Taking oral iron and will continue for at least 4 weeks after discharge. She is tolerating a regular diet without nausea or vomiting. She is voiding spontaneously. She is ambulating. She has passed flatus, and has not a BM. Vaginal bleeding is mild. She denies fever or chills. Abdominal pain is mild and controlled with oral medications. She Is breastfeeding wihout S&S of mastitis or engorgement. D/C Lovenox (taking for Obesity). Hx of Anxiety. No meds and has very strong support system. Undecided as to method of contraception. Strongly desires d/c home today. Use of connected MOM to monitor B/P reinforced along with S&S pre eclampsia. F/U 1 weeks with primary MD for b/p check or sooner prn.     BP (!) 117/56 (BP Location: Left arm, Patient Position: Lying)   Pulse 80   Temp 97.6 °F (36.4 °C) (Oral)   Resp 16   LMP 2024   SpO2 95%   Breastfeeding Unknown     Gen: A&O x 4, NAD  CV: normal HR  Lungs: normal resp effort  Breasts: bilaterally soft, non-tender, nipples intact   Abdomen: soft, non-tender, uterus firm at U - 1 fb  Perineum: approximated, no edema   Lochia: minimal rubra  Ext: bilaterally no pedal edema, without signs of DVT      Final Active Diagnoses:  "   Diagnosis Date Noted POA    Gestational hypertension [O13.9] 2025 No    Gestational hypertension, third trimester [O13.3] 2025 Yes     (spontaneous vaginal delivery) [O80] 2025 Not Applicable    Anxiety [F41.9] 2025 Unknown    Obesity [E66.9] 2023 Yes      Problems Resolved During this Admission:    Diagnosis Date Noted Date Resolved POA    PRINCIPAL PROBLEM:  Encounter for induction of labor [Z34.90] 2025 Not Applicable    Multigravida of advanced maternal age in third trimester [O09.523] 2025 Yes        Significant Diagnostic Studies: Labs: CBC   Recent Labs   Lab 25  1052 25  0409   WBC 12.26 20.26*   HGB 11.4* 9.9*   HCT 34.4* 30.1*    260         Feeding Method: breast    Immunizations       Date Immunization Status Dose Route/Site Given by    25 1248 MMR Incomplete 0.5 mL Subcutaneous/     25 1248 Tdap Incomplete 0.5 mL Intramuscular/             Delivery:    Episiotomy: None   Lacerations: 2nd   Repair suture:     Repair # of packets: 3   Blood loss (ml):       Birth information:  YOB: 2026   Time of birth: 12:02 PM   Sex: female   Delivery type: Vaginal, Spontaneous   Gestational Age: 37w1d     Measurements    Weight: 3460 g  Weight (lbs): 7 lb 10.1 oz  Length: 50.8 cm  Length (in): 20"  Head circumference: 36.2 cm  Chest circumference: 33.7 cm         Delivery Clinician: Delivery Providers    Delivering clinician: Anita Stovall MD   Provider Role    Karli Kauffman MD Resident    Lior Reddy RN Registered Nurse    Dianelys Wolf RN Registered Nurse    Lupis Miller RN Nurse Manager             Additional  information:  Forceps:    Vacuum:    Breech:    Observed anomalies      Living?:     Apgars    Living status: Living  Apgar Component Scores:  1 min.:  5 min.:  10 min.:  15 min.:  20 min.:    Skin color:  1  1       Heart rate:  2  2       Reflex irritability:  2  2   "     Muscle tone:  2  2       Respiratory effort:  2  2       Total:  9  9       Apgars assigned by: JAKUB STODDARD CLC         Placenta: Delivered:       appearance  Pending Diagnostic Studies:       None            Discharged Condition: stable    Disposition: Home or Self Care    Follow Up:   Follow-up Information       Rossy Miles MD. Schedule an appointment as soon as possible for a visit in 1 week(s).    Specialty: Obstetrics and Gynecology  Why: blood pressure check  Contact information:  [address] [address]  [phone]               Rossy Miles MD. Schedule an appointment as soon as possible for a visit in 6 week(s).    Specialty: Obstetrics and Gynecology  Why: for routine postpartum visit  Contact information:  [address] [address]  [phone]                           Patient Instructions:      Diet Adult Regular     Lifting restrictions     Pelvic Rest     Notify your health care provider if you experience any of the following:  temperature >100.4     Notify your health care provider if you experience any of the following:  persistent nausea and vomiting or diarrhea     Notify your health care provider if you experience any of the following:  severe uncontrolled pain     Notify your health care provider if you experience any of the following:  redness, tenderness, or signs of infection (pain, swelling, redness, odor or green/yellow discharge around incision site)     Notify your health care provider if you experience any of the following:  difficulty breathing or increased cough     Notify your health care provider if you experience any of the following:  severe persistent headache     Notify your health care provider if you experience any of the following:  worsening rash     Notify your health care provider if you experience any of the following:  persistent dizziness, light-headedness, or visual disturbances     Notify your health care  provider if you experience any of the following:  increased confusion or weakness     Medications:  Current Discharge Medication List        START taking these medications    Details   acetaminophen (TYLENOL) 325 MG tablet Take 2 tablets (650 mg total) by mouth every 6 (six) hours as needed for Pain.  Qty: 30 tablet, Refills: 0      docusate sodium (COLACE) 100 MG capsule Take 2 capsules (200 mg total) by mouth 2 (two) times daily as needed for Constipation.  Qty: 60 capsule, Refills: 0      ferrous sulfate 325 (65 FE) MG EC tablet Take 1 tablet (325 mg total) by mouth once daily.  Qty: 30 tablet, Refills: 0      ibuprofen (ADVIL,MOTRIN) 600 MG tablet Take 1 tablet (600 mg total) by mouth every 8 (eight) hours as needed for Pain.  Qty: 30 tablet, Refills: 0           CONTINUE these medications which have NOT CHANGED    Details   prenatal vit no.124/iron/folic (PRENATAL VITAMIN ORAL) Take by mouth.           STOP taking these medications       aspirin (ECOTRIN) 81 MG EC tablet Comments:   Reason for Stopping:         progesterone (PROMETRIUM) 200 MG capsule Comments:   Reason for Stopping:             Follow Up/Patient Instructions:   1. Reviewed postpartum recommendations and precautions ~ encouraged to call for fever, severe or increased pain in head, chest, abdomen, perineum or legs; heavy bleeding, foul smelling lochia, signs of depression, or any other concern. Reviewed postpartum precautions r/t high blood pressure ~ encouraged to call for HA, vision changes, epigastric discomfort, or abnormal swelling.  2. Rx provided: Motrin, Colace, Tylenol and Iron.  3. Contraception: Undecided; will f/u at postpartum visit. Encouraged abstinence and pelvic rest for healing until after postpartum check-up.   4. Encouraged to continue PNV while breastfeeding or at least for 6 weeks postpartum. Continue iron for at least 4 weeks after discharge.  5. Car seat law will be reviewed with patient at discharge per protocol  6. RTO  in 1 week for b/p check then 6 weeks or sooner prn.  7. Discharge home today with written and verbal postpartum instructions and precautions.       Maria M Mathews CNM  Obstetrics  [facility]

## 2025-03-08 NOTE — PROGRESS NOTES
[facility], [place] [facility] ([place])  Obstetrics  Postpartum Progress Note    Patient Name: Bozena Queen  MRN: [id number]  Admission Date: 3/6/2025  Hospital Length of Stay: 2 days  Attending Physician: Rossy Miles MD  Primary Care Provider: Jon Mckay MD    Subjective:     Principal Problem: (spontaneous vaginal delivery)    Hospital Course:  34 year old  s/p  of I've infant girl at 37+ 1/7 weeks. 2nd degree laceration. Now PP day #1. She is doing well this morning. Gest HTN. Several elevated b/ps 140s/70s-80 prior to delivery. Only 1 elevation since delivery. No S&S of pre eclampsia. Normal labs with exception of mild acute blood loss Anemia, asymptomatic. Taking oral iron and will continue for at least 4 weeks after discharge. She is tolerating a regular diet without nausea or vomiting. She is voiding spontaneously. She is ambulating. She has passed flatus, and has not a BM. Vaginal bleeding is mild. She denies fever or chills. Abdominal pain is mild and controlled with oral medications. She Is breastfeeding wihout S&S of mastitis or engorgement. D/C Lovenox (taking for Obesity). Hx of Anxiety. No meds and has very strong support system. Undecided as to method of contraception. Use of connected MOM to monitor B/P reinforced along with S&S pre eclampsia. F/U 1 weeks with primary MD for b/p check or sooner prn. Now, desires d/c home tomorrow vs today.    /68 (BP Location: Left arm, Patient Position: Lying)   Pulse 78   Temp 97.6 °F (36.4 °C) (Oral)   Resp 16   LMP 2024   SpO2 100%   Breastfeeding Unknown      Gen: A&O x 4, NAD  Breasts: bilaterally soft, non-tender, nipples intact   Abdomen: soft, non-tender, uterus firm at U - 1 fb  Perineum: approximated, no edema   Lochia: minimal rubra  Ext: bilaterally no pedal edema, without signs of DVT      Objective:     Vital Signs (Most Recent):  Temp: 97.6 °F (36.4 °C) (25 1244)  Pulse: 78 (25 1244)  Resp: 16 (25  1244)  BP: 130/68 (03/08/25 1244)  SpO2: 100 % (03/08/25 1244) Vital Signs (24h Range):  Temp:  [97.5 °F (36.4 °C)-98.1 °F (36.7 °C)] 97.6 °F (36.4 °C)  Pulse:  [69-84] 78  Resp:  [16-18] 16  SpO2:  [95 %-100 %] 100 %  BP: (109-130)/(56-68) 130/68        There is no height or weight on file to calculate BMI.      Intake/Output Summary (Last 24 hours) at 3/8/2025 1705  Last data filed at 3/8/2025 0230  Gross per 24 hour   Intake --   Output 1600 ml   Net -1600 ml         Significant Labs:  Lab Results   Component Value Date    GROUPTRH O POS 03/06/2025    HEPBSAG Non-reactive 08/15/2024     Recent Labs   Lab 03/08/25  0409   HGB 9.9*   HCT 30.1*       CBC:   Recent Labs   Lab 03/08/25  0409   WBC 20.26*   RBC 3.30*   HGB 9.9*   HCT 30.1*      MCV 91   MCH 30.0   MCHC 32.9     I have personallly reviewed all pertinent lab results from the last 24 hours.    Assessment/Plan:     Stable PP Day #1        Disposition: As patient meets milestones, will plan to discharge home tomorrow.    Maria M Mathews CNM  Obstetrics  [facility] ([place]

## 2025-03-08 NOTE — PLAN OF CARE
Pt will identify early hunger cues and respond.   Pt will breastfeed baby on cue or at least every 3 hours  Pt will observe adequate milk transfer- audible/ visual swallows, enough wet and dirty diaper, satisfaction cues and hydration status.   Pt will give expressed breast milk if latch is not yet fully efficient.   Pt will call for further help as needed-

## 2025-03-08 NOTE — HOSPITAL COURSE
34 year old  s/p  of I've infant girl at 37+ 1/7 weeks. 2nd degree laceration. Now PP day #1. She is doing well this morning. Gest HTN. Several elevated b/ps 140s/70s-80 prior to delivery. Only 1 elevation since delivery. No S&S of pre eclampsia. Normal labs with exception of mild acute blood loss Anemia, asymptomatic. Taking oral iron and will continue for at least 4 weeks after discharge. She is tolerating a regular diet without nausea or vomiting. She is voiding spontaneously. She is ambulating. She has passed flatus, and has not a BM. Vaginal bleeding is mild. She denies fever or chills. Abdominal pain is mild and controlled with oral medications. She Is breastfeeding wihout S&S of mastitis or engorgement. D/C Lovenox (taking for Obesity). Hx of Anxiety. No meds and has very strong support system. Undecided as to method of contraception. Use of connected MOM to monitor B/P reinforced along with S&S pre eclampsia. F/U 1 weeks with primary MD for b/p check or sooner prn. Now, desires d/c home tomorrow vs today.    3/9/25 - PPD#2 Doing well overall today, normal lochia, pain controlled with PO meds. Bottle feeding going well. Pumping breast to initiate milk production. Denies headache, visual disturbances, and RUQ epigastric pain. Having some LUQ epigastric pain. Patient also reports ongoing nausea and has not kept down solid food since Friday evening. Has episode of vomiting yesterday. Plan to discharge home once patient experiences resolution of symptoms.

## 2025-03-08 NOTE — ANESTHESIA POSTPROCEDURE EVALUATION
Anesthesia Post Evaluation    Patient: Bozena Queen    Procedure(s) Performed: * No procedures listed *    Final Anesthesia Type: epidural      Patient location during evaluation: floor  Patient participation: Yes- Able to Participate  Level of consciousness: awake and alert, oriented and awake  Post-procedure vital signs: reviewed and stable  Pain management: adequate  Airway patency: patent    PONV status at discharge: No PONV  Anesthetic complications: no      Cardiovascular status: blood pressure returned to baseline and hemodynamically stable  Respiratory status: unassisted  Hydration status: euvolemic  Follow-up not needed.              Vitals Value Taken Time   /56 03/08/25 07:58   Temp 36.4 °C (97.6 °F) 03/08/25 07:58   Pulse 80 03/08/25 07:58   Resp 16 03/08/25 07:58   SpO2 95 % 03/08/25 07:58         No case tracking events are documented in the log.      Pain/Luzma Score: Pain Rating Prior to Med Admin: 2 (3/8/2025  4:22 AM)

## 2025-03-08 NOTE — LACTATION NOTE
25 1800   Maternal Assessment   Breast Shape round;Left:   Breast Density soft;Left:   Areola Left:;elastic   Nipples Left:;short   Maternal Infant Feeding   Maternal Emotional State assist needed   Infant Positioning clutch/football   Latch Assistance yes     Assisted pt with position and latch. Baby attempting to latch. Baby actively trying to latch but unable to sustain latch. Drops of colostrum expressed to tip of nipple. Discussed feeding behaviors of the late  infant. Encouraged pt to call  MB RN for assistance with breastfeeding if needed.  Educated pt on importance of skin to skin. Baby left skin to skin with pt.

## 2025-03-09 VITALS
RESPIRATION RATE: 18 BRPM | TEMPERATURE: 99 F | SYSTOLIC BLOOD PRESSURE: 136 MMHG | DIASTOLIC BLOOD PRESSURE: 84 MMHG | OXYGEN SATURATION: 99 % | HEART RATE: 74 BPM

## 2025-03-09 LAB
INFLUENZA A, MOLECULAR: NEGATIVE
INFLUENZA B, MOLECULAR: NEGATIVE
SARS-COV-2 RDRP RESP QL NAA+PROBE: NEGATIVE
SPECIMEN SOURCE: NORMAL

## 2025-03-09 PROCEDURE — 25000003 PHARM REV CODE 250

## 2025-03-09 PROCEDURE — 87635 SARS-COV-2 COVID-19 AMP PRB: CPT

## 2025-03-09 PROCEDURE — 87502 INFLUENZA DNA AMP PROBE: CPT

## 2025-03-09 RX ORDER — ONDANSETRON 8 MG/1
8 TABLET, ORALLY DISINTEGRATING ORAL EVERY 6 HOURS PRN
Status: DISCONTINUED | OUTPATIENT
Start: 2025-03-09 | End: 2025-03-09

## 2025-03-09 RX ORDER — METOCLOPRAMIDE 10 MG/1
10 TABLET ORAL EVERY 6 HOURS
Status: DISCONTINUED | OUTPATIENT
Start: 2025-03-09 | End: 2025-03-09

## 2025-03-09 RX ORDER — ONDANSETRON 4 MG/1
4 TABLET, ORALLY DISINTEGRATING ORAL EVERY 6 HOURS
Status: DISCONTINUED | OUTPATIENT
Start: 2025-03-09 | End: 2025-03-09 | Stop reason: HOSPADM

## 2025-03-09 RX ORDER — METOCLOPRAMIDE 10 MG/1
10 TABLET ORAL EVERY 8 HOURS
Status: DISCONTINUED | OUTPATIENT
Start: 2025-03-09 | End: 2025-03-09 | Stop reason: HOSPADM

## 2025-03-09 RX ORDER — ONDANSETRON 4 MG/1
8 TABLET, ORALLY DISINTEGRATING ORAL 2 TIMES DAILY
Qty: 20 TABLET | Refills: 0 | Status: SHIPPED | OUTPATIENT
Start: 2025-03-09

## 2025-03-09 RX ADMIN — ONDANSETRON 4 MG: 4 TABLET, ORALLY DISINTEGRATING ORAL at 12:03

## 2025-03-09 RX ADMIN — PROMETHAZINE HYDROCHLORIDE 12.5 MG: 12.5 TABLET ORAL at 09:03

## 2025-03-09 RX ADMIN — ACETAMINOPHEN 650 MG: 325 TABLET, FILM COATED ORAL at 12:03

## 2025-03-09 RX ADMIN — ACETAMINOPHEN 650 MG: 325 TABLET, FILM COATED ORAL at 05:03

## 2025-03-09 RX ADMIN — IBUPROFEN 600 MG: 600 TABLET ORAL at 05:03

## 2025-03-09 RX ADMIN — DOCUSATE SODIUM 200 MG: 100 CAPSULE, LIQUID FILLED ORAL at 09:03

## 2025-03-09 RX ADMIN — METOCLOPRAMIDE 10 MG: 10 TABLET ORAL at 12:03

## 2025-03-09 NOTE — LACTATION NOTE
Lactation Round: Pt shared that she has been ill, which interfered with breastfeeding. LC acknowledged understanding and encouraged Pt to resume latching, pumping, and supplementing once Pt feels well enough. LC reinforced baby's volume needs for day two. LC stressed the importance of stimulating breast eight or more in twenty-four to build/maintain supply. All questions answered.

## 2025-03-09 NOTE — DISCHARGE SUMMARY
Delivery Discharge Summary  Obstetrics      Primary OB Clinician: Rossy Miles MD      Admission date: 3/6/2025  Discharge date: 2025    Disposition: To home, self care    Discharge Diagnosis List:    Problem List[1]    Procedure:     Hospital Course:  Bozena Queen is a 34 y.o. now , PPD #2 who was admitted on 3/6/2025 at 37w0d for IOL secondary to gHTN. Patient was subsequently admitted to labor and delivery unit with signed consents.     Labor course was uncomplicated and resulted in  without complications.     The post-partum course was complicated by nausea and vomiting. Symptoms were controlled with anti-emetics and patient was tolerating PO by the time of discharge.     Please see delivery note for further details. On discharge day, patient's pain is controlled with oral pain medications. Pt is tolerating ambulation without SOB or CP. Reports lochia is mild. Denies any HA, vision changes, F/C, LE swelling. Denies any breast pain/soreness.    Pt in stable condition and ready for discharge. She has been instructed to start and/or continue medications and follow up with her obstetrics provider as listed below.    Pertinent studies:  CBC  Recent Labs   Lab 25  1052 25  0409   WBC 12.26 20.26*   HGB 11.4* 9.9*   HCT 34.4* 30.1*   MCV 90 91    260        Immunization History   Administered Date(s) Administered    COVID-19, MRNA, LN-S, PF (Pfizer) (Purple Cap) 2021, 2021    DTaP (5 Pertussis Antigens) 1990, 1990, 1990, 1991, 1994    HIB 1991    Hepatitis B 1999, 1999, 1999    Influenza - Quadrivalent - PF *Preferred* (6 months and older) 2019, 10/05/2020, 10/11/2021    MMR 1991, 1995    OPV 1989, 1990, 1990, 1991    Rsv, Bivalent, Rsvpref (Abrysvo) 2025    Tdap 2019, 2025        Delivery:    Episiotomy: None   Lacerations: 2nd   Repair suture:    "  Repair # of packets: 3   Blood loss (ml):       Birth information:  YOB: 2026   Time of birth: 12:02 PM   Sex: female   Delivery type: Vaginal, Spontaneous   Gestational Age: 37w1d     Measurements    Weight: 3460 g  Weight (lbs): 7 lb 10.1 oz  Length: 50.8 cm  Length (in): 20"  Head circumference: 36.2 cm  Chest circumference: 33.7 cm         Delivery Clinician: Delivery Providers    Delivering clinician: Anita Stovall MD   Provider Role    Karli Kauffman MD Resident    Lior Reddy RN Registered Nurse    Dianelys Wolf RN Registered Nurse    Lupis Miller RN Nurse Manager             Additional  information:  Forceps:    Vacuum:    Breech:    Observed anomalies      Living?:     Apgars    Living status: Living  Apgar Component Scores:  1 min.:  5 min.:  10 min.:  15 min.:  20 min.:    Skin color:  1  1       Heart rate:  2  2       Reflex irritability:  2  2       Muscle tone:  2  2       Respiratory effort:  2  2       Total:  9  9       Apgars assigned by: JAKUB WOLF [facility]         Placenta: Delivered:       appearance    Patient Instructions:   Current Discharge Medication List        START taking these medications    Details   acetaminophen (TYLENOL) 325 MG tablet Take 2 tablets (650 mg total) by mouth every 6 (six) hours as needed for Pain.  Qty: 30 tablet, Refills: 0      docusate sodium (COLACE) 100 MG capsule Take 2 capsules (200 mg total) by mouth 2 (two) times daily as needed for Constipation.  Qty: 60 capsule, Refills: 0      ferrous sulfate 325 (65 FE) MG EC tablet Take 1 tablet (325 mg total) by mouth once daily.  Qty: 30 tablet, Refills: 0      ibuprofen (ADVIL,MOTRIN) 600 MG tablet Take 1 tablet (600 mg total) by mouth every 8 (eight) hours as needed for Pain.  Qty: 30 tablet, Refills: 0      ondansetron (ZOFRAN-ODT) 4 MG TbDL Take 2 tablets (8 mg total) by mouth 2 (two) times daily.  Qty: 20 tablet, Refills: 0           CONTINUE these medications which have " NOT CHANGED    Details   prenatal vit no.124/iron/folic (PRENATAL VITAMIN ORAL) Take by mouth.           STOP taking these medications       aspirin (ECOTRIN) 81 MG EC tablet Comments:   Reason for Stopping:         progesterone (PROMETRIUM) 200 MG capsule Comments:   Reason for Stopping:               Discharge Procedure Orders   Diet Adult Regular     Diet Adult Regular     Lifting restrictions     Pelvic Rest     Notify your health care provider if you experience any of the following:  temperature >100.4     Notify your health care provider if you experience any of the following:  persistent nausea and vomiting or diarrhea     Notify your health care provider if you experience any of the following:  severe uncontrolled pain     Notify your health care provider if you experience any of the following:  redness, tenderness, or signs of infection (pain, swelling, redness, odor or green/yellow discharge around incision site)     Notify your health care provider if you experience any of the following:  difficulty breathing or increased cough     Notify your health care provider if you experience any of the following:  severe persistent headache     Notify your health care provider if you experience any of the following:  worsening rash     Notify your health care provider if you experience any of the following:  persistent dizziness, light-headedness, or visual disturbances     Notify your health care provider if you experience any of the following:  increased confusion or weakness     Lifting restrictions   Order Comments: If : No lifting >10lbs for 6 weeks     Other restrictions (specify):   Scheduling Instructions: If  or vaginal laceration with sutures: No soaking in bath x 2 weeks. Shower only.     No driving until:   Order Comments: No driving on narcotics. No driving until able to react quickly. For example: slam on breaks.     Pelvic Rest   Order Comments: Pelvic rest x 6 weeks.     Notify  your health care provider if you experience any of the following:  temperature >100.4     Notify your health care provider if you experience any of the following:  persistent nausea and vomiting or diarrhea     Notify your health care provider if you experience any of the following:  severe uncontrolled pain     Notify your health care provider if you experience any of the following:  redness, tenderness, or signs of infection (pain, swelling, redness, odor or green/yellow discharge around incision site)     Notify your health care provider if you experience any of the following:  difficulty breathing or increased cough     Notify your health care provider if you experience any of the following:  severe persistent headache     Notify your health care provider if you experience any of the following:  worsening rash     Notify your health care provider if you experience any of the following:  persistent dizziness, light-headedness, or visual disturbances     Notify your health care provider if you experience any of the following:  increased confusion or weakness     Notify your health care provider if you experience any of the following:   Order Comments: Notify provider if soaking through 1 pad an hour.     Activity as tolerated        Follow-up Information       Rossy Miles MD. Schedule an appointment as soon as possible for a visit in 1 week(s).    Specialty: Obstetrics and Gynecology  Why: blood pressure check  Contact information:  [address] [id number] [phone]               Rossy Miles MD. Schedule an appointment as soon as possible for a visit in 6 week(s).    Specialty: Obstetrics and Gynecology  Why: for routine postpartum visit  Contact information:  [address] [id number] [phone]                              Karli Kauffman MD  OBGYN   PGY-1           [1]   Patient Active Problem List  Diagnosis    Attention deficit hyperactivity  disorder (ADHD), predominantly inattentive type    Obesity    Amenorrhea    Gestational hypertension, third trimester     (spontaneous vaginal delivery)    Anxiety

## 2025-03-09 NOTE — CARE UPDATE
Resident to room to re-evaluate nausea.     Patient notes she remains nauseous with intermittent abdominal pain however has been able to eat 1/2 of a sandwich. She notes she would like to go home.    Given VSS and patient now tolerating PO, will consider discharge. Counseled patient on return precautions.     Temp:  [97.9 °F (36.6 °C)-98.7 °F (37.1 °C)] 98.7 °F (37.1 °C)  Pulse:  [58-74] 74  Resp:  [16-20] 18  SpO2:  [96 %-100 %] 99 %  BP: (111-147)/(63-84) 136/84      Karli Kauffman MD  OBGYN   PGY-1

## 2025-03-09 NOTE — SUBJECTIVE & OBJECTIVE
Interval History: PPD#2    She is doing well this morning. She is not tolerating a regular diet without nausea or vomiting. She is voiding spontaneously. She is ambulating. She has passed flatus, and has a BM. Vaginal bleeding is mild. She denies fever or chills. Abdominal pain is mild and controlled with oral medications. She Is not breastfeeding. She desires circumcision for her male baby: not applicable.    Objective:     Vital Signs (Most Recent):  Temp: 97.9 °F (36.6 °C) (03/09/25 0805)  Pulse: 61 (03/09/25 0805)  Resp: 16 (03/09/25 0805)  BP: 131/64 (03/09/25 0805)  SpO2: 97 % (03/09/25 0805) Vital Signs (24h Range):  Temp:  [97.6 °F (36.4 °C)-98 °F (36.7 °C)] 97.9 °F (36.6 °C)  Pulse:  [58-78] 61  Resp:  [16-20] 16  SpO2:  [96 %-100 %] 97 %  BP: (111-147)/(63-76) 131/64        There is no height or weight on file to calculate BMI.      Intake/Output Summary (Last 24 hours) at 3/9/2025 0950  Last data filed at 3/8/2025 1515  Gross per 24 hour   Intake 800 ml   Output 1 ml   Net 799 ml         Significant Labs:  Lab Results   Component Value Date    GROUPTRH O POS 03/06/2025    HEPBSAG Non-reactive 08/15/2024     Recent Labs   Lab 03/08/25  0409   HGB 9.9*   HCT 30.1*       CBC:   Recent Labs   Lab 03/08/25  0409   WBC 20.26*   RBC 3.30*   HGB 9.9*   HCT 30.1*      MCV 91   MCH 30.0   MCHC 32.9     I have personallly reviewed all pertinent lab results from the last 24 hours.    Physical Exam:   Constitutional: She is oriented to person, place, and time. She appears well-developed and well-nourished.    HENT:   Head: Normocephalic and atraumatic.    Eyes: Conjunctivae are normal.     Cardiovascular:  Normal rate.             Pulmonary/Chest: Effort normal.        Abdominal: Soft. There is no abdominal tenderness.     Genitourinary: There is vaginal discharge (lochia) in the vagina.           Musculoskeletal: Normal range of motion.       Neurological: She is alert and oriented to person, place, and time.     Skin: Skin is warm and dry.    Psychiatric: She has a normal mood and affect. Her behavior is normal. Judgment and thought content normal.       Review of Systems   Constitutional:  Negative for chills and fever.   Eyes: Negative.  Negative for visual disturbance.   Respiratory: Negative.     Cardiovascular: Negative.    Gastrointestinal:  Positive for abdominal pain (cramping, LUQ pain), nausea and vomiting.   Endocrine: Negative.    Genitourinary:  Positive for vaginal bleeding (lochia). Negative for vaginal odor.   Musculoskeletal: Negative.    Integumentary:  Negative.   Neurological: Negative.  Negative for headaches.   Hematological: Negative.    Psychiatric/Behavioral: Negative.     Breast: negative.

## 2025-03-09 NOTE — CARE UPDATE
Resident to bedside as patient reporting nausea and vomiting.     Patient states she feels constantly nauseous and has been dry heaving. No vomiting. Not tolerating PO. Attempted to eat oatmeal. Patient notes intermittent upper abdominal pain, left and right. None at the moment.     No sick contacts. Hx of gallstones which she said felt similar however at that time it was left-sided pain only. Patient is passing gas and having BM.     Bleeding minimal.     Temp:  [97.6 °F (36.4 °C)-98.7 °F (37.1 °C)] 98.7 °F (37.1 °C)  Pulse:  [58-78] 74  Resp:  [16-20] 18  SpO2:  [96 %-100 %] 99 %  BP: (111-147)/(63-84) 136/84    Exam: Abdomen soft, non-tender. No rebound or guarding.    Plan:   - Zofran and reglan scheduled  - Collect COVID and Flu swab     Karli Kauffman MD  OBGYN   PGY-1

## 2025-03-09 NOTE — CARE UPDATE
Resident to bedside for patient concerns of vomiting and fatigue       Pt states that she vomited approx one hour ago, at which time her mother and brother noted her to be pale. She only vomited once, nonbloddy in appearance. Pt denies dizziness, SOB, or fatigue with walking. Describes lochia as mild. Also endorses occasional LUQ pain. Pt reports she is ambulating, voiding, and passing flatus/BM without issue.       Temp:  [97.5 °F (36.4 °C)-98.1 °F (36.7 °C)] 98 °F (36.7 °C)  Pulse:  [63-84] 63  Resp:  [16-20] 20  SpO2:  [95 %-100 %] 99 %  BP: (109-147)/(56-72) 147/72    PE:  Gen: NAD, laying comfortably in bed   Ext: cap refill 2-3 seconds   Abdomen: nontender, nondistended       Plan:   -Pt endorses +flatus, +ambulation, + BM  -pt reports minimal PO intake today   -Pt encouraged to increase PO intake, now s/p zofran with relief of nausea   -Can consider CBC/Covid/Flu if symptoms persist. PT in agreement with plan. Denies URI symptoms at this time       Shruti Tripp MD ([place])   Obstetrics and Gynecology, PGY1

## 2025-03-09 NOTE — PROGRESS NOTES
[facility]  Obstetrics  Postpartum Progress Note    Patient Name: Bozena Queen  MRN: [id number]  Admission Date: 3/6/2025  Hospital Length of Stay: 3 days  Attending Physician: Rossy Miles MD  Primary Care Provider: Jon Mckay MD    Subjective:     Principal Problem: (spontaneous vaginal delivery)    Hospital Course:  34 year old  s/p  of I've infant girl at 37+ 1/7 weeks. 2nd degree laceration. Now PP day #1. She is doing well this morning. Gest HTN. Several elevated b/ps 140s/70s-80 prior to delivery. Only 1 elevation since delivery. No S&S of pre eclampsia. Normal labs with exception of mild acute blood loss Anemia, asymptomatic. Taking oral iron and will continue for at least 4 weeks after discharge. She is tolerating a regular diet without nausea or vomiting. She is voiding spontaneously. She is ambulating. She has passed flatus, and has not a BM. Vaginal bleeding is mild. She denies fever or chills. Abdominal pain is mild and controlled with oral medications. She Is breastfeeding wihout S&S of mastitis or engorgement. D/C Lovenox (taking for Obesity). Hx of Anxiety. No meds and has very strong support system. Undecided as to method of contraception. Use of connected MOM to monitor B/P reinforced along with S&S pre eclampsia. F/U 1 weeks with primary MD for b/p check or sooner prn. Now, desires d/c home tomorrow vs today.    3/9/25 - PPD#2 Doing well overall today, normal lochia, pain controlled with PO meds. Bottle feeding going well. Pumping breast to initiate milk production. Denies headache, visual disturbances, and RUQ epigastric pain. Having some LUQ epigastric pain. Patient also reports ongoing nausea and has not kept down solid food since Friday evening. Has episode of vomiting yesterday. Plan to discharge home once patient experiences resolution of symptoms.      Interval History: PPD#2    She is doing well this morning. She is not tolerating a regular diet  without nausea or vomiting. She is voiding spontaneously. She is ambulating. She has passed flatus, and has a BM. Vaginal bleeding is mild. She denies fever or chills. Abdominal pain is mild and controlled with oral medications. She Is not breastfeeding. She desires circumcision for her male baby: not applicable.    Objective:     Vital Signs (Most Recent):  Temp: 97.9 °F (36.6 °C) (03/09/25 0805)  Pulse: 61 (03/09/25 0805)  Resp: 16 (03/09/25 0805)  BP: 131/64 (03/09/25 0805)  SpO2: 97 % (03/09/25 0805) Vital Signs (24h Range):  Temp:  [97.6 °F (36.4 °C)-98 °F (36.7 °C)] 97.9 °F (36.6 °C)  Pulse:  [58-78] 61  Resp:  [16-20] 16  SpO2:  [96 %-100 %] 97 %  BP: (111-147)/(63-76) 131/64        There is no height or weight on file to calculate BMI.      Intake/Output Summary (Last 24 hours) at 3/9/2025 0950  Last data filed at 3/8/2025 1515  Gross per 24 hour   Intake 800 ml   Output 1 ml   Net 799 ml         Significant Labs:  Lab Results   Component Value Date    GROUPTRH O POS 03/06/2025    HEPBSAG Non-reactive 08/15/2024     Recent Labs   Lab 03/08/25  0409   HGB 9.9*   HCT 30.1*       CBC:   Recent Labs   Lab 03/08/25  0409   WBC 20.26*   RBC 3.30*   HGB 9.9*   HCT 30.1*      MCV 91   MCH 30.0   MCHC 32.9     I have personallly reviewed all pertinent lab results from the last 24 hours.    Physical Exam:   Constitutional: She is oriented to person, place, and time. She appears well-developed and well-nourished.    HENT:   Head: Normocephalic and atraumatic.    Eyes: Conjunctivae are normal.     Cardiovascular:  Normal rate.             Pulmonary/Chest: Effort normal.        Abdominal: Soft. There is no abdominal tenderness.     Genitourinary: There is vaginal discharge (lochia) in the vagina.           Musculoskeletal: Normal range of motion.       Neurological: She is alert and oriented to person, place, and time.    Skin: Skin is warm and dry.    Psychiatric: She has a normal mood and affect. Her behavior is  normal. Judgment and thought content normal.       Review of Systems   Constitutional:  Negative for chills and fever.   Eyes: Negative.  Negative for visual disturbance.   Respiratory: Negative.     Cardiovascular: Negative.    Gastrointestinal:  Positive for abdominal pain (cramping, LUQ pain), nausea and vomiting.   Endocrine: Negative.    Genitourinary:  Positive for vaginal bleeding (lochia). Negative for vaginal odor.   Musculoskeletal: Negative.    Integumentary:  Negative.   Neurological: Negative.  Negative for headaches.   Hematological: Negative.    Psychiatric/Behavioral: Negative.     Breast: negative.      Assessment/Plan:     34 y.o. female  for:    *  (spontaneous vaginal delivery)  3/9/25 - PPD#2 Doing well overall today, normal lochia, pain controlled with PO meds. Bottle feeding going well. Pumping breast to initiate milk production. Denies headache, visual disturbances, and RUQ epigastric pain. Having some LUQ epigastric pain. Patient also reports ongoing nausea and has not kept down solid food since Friday evening. Has episode of vomiting yesterday. Plan to discharge home once patient experiences resolution of symptoms.    Anxiety  2week mood check PP or sooner PRN      Gestational hypertension, third trimester  Take b/p at home   S&S pre eclampsia reinforced  1 week b/p check    Obesity  D/C Lovenox        Disposition: As patient meets milestones, will plan to discharge today/tomorrow.    MICHAELA Loredo CNM  Obstetrics  [facility]

## 2025-03-09 NOTE — ASSESSMENT & PLAN NOTE
3/9/25 - PPD#2 Doing well overall today, normal lochia, pain controlled with PO meds. Bottle feeding going well. Pumping breast to initiate milk production. Denies headache, visual disturbances, and RUQ epigastric pain. Having some LUQ epigastric pain. Patient also reports ongoing nausea and has not kept down solid food since Friday evening. Has episode of vomiting yesterday. Plan to discharge home once patient experiences resolution of symptoms.

## 2025-03-10 ENCOUNTER — HOSPITAL ENCOUNTER (OUTPATIENT)
Facility: OTHER | Age: 35
Discharge: HOME | End: 2025-03-11
Attending: OBSTETRICS & GYNECOLOGY | Admitting: OBSTETRICS & GYNECOLOGY
Payer: COMMERCIAL

## 2025-03-10 ENCOUNTER — PATIENT MESSAGE (OUTPATIENT)
Dept: OBSTETRICS AND GYNECOLOGY | Facility: OTHER | Age: 35
End: 2025-03-10
Payer: COMMERCIAL

## 2025-03-10 ENCOUNTER — PATIENT MESSAGE (OUTPATIENT)
Facility: CLINIC | Age: 35
End: 2025-03-10
Payer: COMMERCIAL

## 2025-03-10 ENCOUNTER — PATIENT MESSAGE (OUTPATIENT)
Dept: OBSTETRICS AND GYNECOLOGY | Facility: CLINIC | Age: 35
End: 2025-03-10
Payer: COMMERCIAL

## 2025-03-10 ENCOUNTER — NURSE TRIAGE (OUTPATIENT)
Dept: ADMINISTRATIVE | Facility: CLINIC | Age: 35
End: 2025-03-10
Payer: COMMERCIAL

## 2025-03-10 DIAGNOSIS — R10.9 ABDOMINAL PAIN: ICD-10-CM

## 2025-03-10 DIAGNOSIS — R11.2 NAUSEA AND VOMITING, UNSPECIFIED VOMITING TYPE: ICD-10-CM

## 2025-03-10 DIAGNOSIS — K80.10 CALCULUS OF GALLBLADDER WITH CHRONIC CHOLECYSTITIS WITHOUT OBSTRUCTION: ICD-10-CM

## 2025-03-10 DIAGNOSIS — R10.12 LEFT UPPER QUADRANT ABDOMINAL PAIN: ICD-10-CM

## 2025-03-10 DIAGNOSIS — K29.00 ACUTE GASTRITIS WITHOUT HEMORRHAGE, UNSPECIFIED GASTRITIS TYPE: Primary | ICD-10-CM

## 2025-03-10 DIAGNOSIS — K29.70 GASTRITIS, PRESENCE OF BLEEDING UNSPECIFIED, UNSPECIFIED CHRONICITY, UNSPECIFIED GASTRITIS TYPE: ICD-10-CM

## 2025-03-10 LAB
ALBUMIN SERPL BCP-MCNC: 2.8 G/DL (ref 3.5–5.2)
ALP SERPL-CCNC: 100 U/L (ref 40–150)
ALT SERPL W/O P-5'-P-CCNC: 20 U/L (ref 10–44)
ANION GAP SERPL CALC-SCNC: 10 MMOL/L (ref 8–16)
AST SERPL-CCNC: 17 U/L (ref 10–40)
BASOPHILS # BLD AUTO: 0.04 K/UL (ref 0–0.2)
BASOPHILS NFR BLD: 0.4 % (ref 0–1.9)
BILIRUB SERPL-MCNC: 0.2 MG/DL (ref 0.1–1)
BNP SERPL-MCNC: 55 PG/ML (ref 0–99)
BUN SERPL-MCNC: 8 MG/DL (ref 6–20)
CALCIUM SERPL-MCNC: 8.7 MG/DL (ref 8.7–10.5)
CHLORIDE SERPL-SCNC: 107 MMOL/L (ref 95–110)
CO2 SERPL-SCNC: 22 MMOL/L (ref 23–29)
CREAT SERPL-MCNC: 0.7 MG/DL (ref 0.5–1.4)
DIFFERENTIAL METHOD BLD: ABNORMAL
EOSINOPHIL # BLD AUTO: 0 K/UL (ref 0–0.5)
EOSINOPHIL NFR BLD: 0.4 % (ref 0–8)
ERYTHROCYTE [DISTWIDTH] IN BLOOD BY AUTOMATED COUNT: 13.3 % (ref 11.5–14.5)
EST. GFR  (NO RACE VARIABLE): >60 ML/MIN/1.73 M^2
GLUCOSE SERPL-MCNC: 80 MG/DL (ref 70–110)
HCT VFR BLD AUTO: 28.7 % (ref 37–48.5)
HGB BLD-MCNC: 9.7 G/DL (ref 12–16)
IMM GRANULOCYTES # BLD AUTO: 0.06 K/UL (ref 0–0.04)
IMM GRANULOCYTES NFR BLD AUTO: 0.5 % (ref 0–0.5)
LYMPHOCYTES # BLD AUTO: 1.4 K/UL (ref 1–4.8)
LYMPHOCYTES NFR BLD: 12.4 % (ref 18–48)
MAGNESIUM SERPL-MCNC: 1.7 MG/DL (ref 1.6–2.6)
MCH RBC QN AUTO: 30.7 PG (ref 27–31)
MCHC RBC AUTO-ENTMCNC: 33.8 G/DL (ref 32–36)
MCV RBC AUTO: 91 FL (ref 82–98)
MONOCYTES # BLD AUTO: 0.7 K/UL (ref 0.3–1)
MONOCYTES NFR BLD: 6.1 % (ref 4–15)
NEUTROPHILS # BLD AUTO: 9 K/UL (ref 1.8–7.7)
NEUTROPHILS NFR BLD: 80.2 % (ref 38–73)
NRBC BLD-RTO: 0 /100 WBC
PHOSPHATE SERPL-MCNC: 3.7 MG/DL (ref 2.7–4.5)
PLATELET # BLD AUTO: 310 K/UL (ref 150–450)
PMV BLD AUTO: 9.6 FL (ref 9.2–12.9)
POTASSIUM SERPL-SCNC: 3.6 MMOL/L (ref 3.5–5.1)
PROT SERPL-MCNC: 6.8 G/DL (ref 6–8.4)
RBC # BLD AUTO: 3.16 M/UL (ref 4–5.4)
SODIUM SERPL-SCNC: 139 MMOL/L (ref 136–145)
WBC # BLD AUTO: 11.22 K/UL (ref 3.9–12.7)

## 2025-03-10 PROCEDURE — 25500020 PHARM REV CODE 255: Performed by: OBSTETRICS & GYNECOLOGY

## 2025-03-10 PROCEDURE — 96361 HYDRATE IV INFUSION ADD-ON: CPT

## 2025-03-10 PROCEDURE — 63600175 PHARM REV CODE 636 W HCPCS: Performed by: OBSTETRICS & GYNECOLOGY

## 2025-03-10 PROCEDURE — 83735 ASSAY OF MAGNESIUM: CPT | Performed by: OBSTETRICS & GYNECOLOGY

## 2025-03-10 PROCEDURE — 99284 EMERGENCY DEPT VISIT MOD MDM: CPT | Mod: ,,, | Performed by: OBSTETRICS & GYNECOLOGY

## 2025-03-10 PROCEDURE — 84100 ASSAY OF PHOSPHORUS: CPT | Performed by: OBSTETRICS & GYNECOLOGY

## 2025-03-10 PROCEDURE — 99285 EMERGENCY DEPT VISIT HI MDM: CPT | Mod: 25

## 2025-03-10 PROCEDURE — 83880 ASSAY OF NATRIURETIC PEPTIDE: CPT | Performed by: OBSTETRICS & GYNECOLOGY

## 2025-03-10 PROCEDURE — 85025 COMPLETE CBC W/AUTO DIFF WBC: CPT | Performed by: OBSTETRICS & GYNECOLOGY

## 2025-03-10 PROCEDURE — G0378 HOSPITAL OBSERVATION PER HR: HCPCS

## 2025-03-10 PROCEDURE — 25000003 PHARM REV CODE 250: Performed by: OBSTETRICS & GYNECOLOGY

## 2025-03-10 PROCEDURE — 63600175 PHARM REV CODE 636 W HCPCS

## 2025-03-10 PROCEDURE — 80053 COMPREHEN METABOLIC PANEL: CPT | Performed by: OBSTETRICS & GYNECOLOGY

## 2025-03-10 RX ORDER — PANTOPRAZOLE SODIUM 40 MG/10ML
40 INJECTION, POWDER, LYOPHILIZED, FOR SOLUTION INTRAVENOUS 2 TIMES DAILY
Status: DISCONTINUED | OUTPATIENT
Start: 2025-03-10 | End: 2025-03-11 | Stop reason: HOSPADM

## 2025-03-10 RX ORDER — SUCRALFATE 1 G/10ML
1 SUSPENSION ORAL EVERY 6 HOURS PRN
Status: DISCONTINUED | OUTPATIENT
Start: 2025-03-11 | End: 2025-03-11 | Stop reason: HOSPADM

## 2025-03-10 RX ORDER — ACETAMINOPHEN 325 MG/1
650 TABLET ORAL EVERY 6 HOURS PRN
Status: DISCONTINUED | OUTPATIENT
Start: 2025-03-10 | End: 2025-03-11 | Stop reason: HOSPADM

## 2025-03-10 RX ORDER — LANOLIN ALCOHOL/MO/W.PET/CERES
1 CREAM (GRAM) TOPICAL DAILY
Status: DISCONTINUED | OUTPATIENT
Start: 2025-03-11 | End: 2025-03-11 | Stop reason: HOSPADM

## 2025-03-10 RX ORDER — ONDANSETRON HYDROCHLORIDE 2 MG/ML
8 INJECTION, SOLUTION INTRAVENOUS ONCE
Status: DISCONTINUED | OUTPATIENT
Start: 2025-03-10 | End: 2025-03-10

## 2025-03-10 RX ORDER — PROCHLORPERAZINE EDISYLATE 5 MG/ML
2.5 INJECTION INTRAMUSCULAR; INTRAVENOUS
Status: DISCONTINUED | OUTPATIENT
Start: 2025-03-11 | End: 2025-03-11

## 2025-03-10 RX ORDER — ALUMINUM HYDROXIDE, MAGNESIUM HYDROXIDE, AND SIMETHICONE 1200; 120; 1200 MG/30ML; MG/30ML; MG/30ML
30 SUSPENSION ORAL
Status: DISCONTINUED | OUTPATIENT
Start: 2025-03-11 | End: 2025-03-11

## 2025-03-10 RX ORDER — ONDANSETRON HYDROCHLORIDE 2 MG/ML
8 INJECTION, SOLUTION INTRAVENOUS
Status: DISCONTINUED | OUTPATIENT
Start: 2025-03-10 | End: 2025-03-11 | Stop reason: HOSPADM

## 2025-03-10 RX ORDER — SIMETHICONE 80 MG
1 TABLET,CHEWABLE ORAL ONCE
Status: COMPLETED | OUTPATIENT
Start: 2025-03-10 | End: 2025-03-10

## 2025-03-10 RX ORDER — DOCUSATE SODIUM 100 MG/1
200 CAPSULE, LIQUID FILLED ORAL 2 TIMES DAILY PRN
Status: DISCONTINUED | OUTPATIENT
Start: 2025-03-10 | End: 2025-03-11 | Stop reason: HOSPADM

## 2025-03-10 RX ORDER — SODIUM CHLORIDE 0.9 % (FLUSH) 0.9 %
10 SYRINGE (ML) INJECTION EVERY 6 HOURS PRN
Status: DISCONTINUED | OUTPATIENT
Start: 2025-03-10 | End: 2025-03-11 | Stop reason: HOSPADM

## 2025-03-10 RX ORDER — SODIUM CHLORIDE, SODIUM LACTATE, POTASSIUM CHLORIDE, CALCIUM CHLORIDE 600; 310; 30; 20 MG/100ML; MG/100ML; MG/100ML; MG/100ML
INJECTION, SOLUTION INTRAVENOUS CONTINUOUS
Status: DISCONTINUED | OUTPATIENT
Start: 2025-03-10 | End: 2025-03-11 | Stop reason: HOSPADM

## 2025-03-10 RX ADMIN — IOHEXOL 100 ML: 350 INJECTION, SOLUTION INTRAVENOUS at 05:03

## 2025-03-10 RX ADMIN — SIMETHICONE 80 MG: 80 TABLET, CHEWABLE ORAL at 04:03

## 2025-03-10 RX ADMIN — SODIUM CHLORIDE, POTASSIUM CHLORIDE, SODIUM LACTATE AND CALCIUM CHLORIDE: 600; 310; 30; 20 INJECTION, SOLUTION INTRAVENOUS at 09:03

## 2025-03-10 RX ADMIN — SODIUM CHLORIDE, SODIUM LACTATE, POTASSIUM CHLORIDE, CALCIUM CHLORIDE AND DEXTROSE MONOHYDRATE 1000 ML: 5; 600; 310; 30; 20 INJECTION, SOLUTION INTRAVENOUS at 03:03

## 2025-03-10 NOTE — TELEPHONE ENCOUNTER
Spoke to patient regarding persistent nausea and vomiting since discharge from L&D. Sattes the zofran does not help and has nor been able to keep down food/fluids since Saturday morning. Feeling weakness and dizziness with standing. Discussed with Dr. Miles and advised that another medication can be sent called phenergan. Also IV fluids and nausea medication in L&D. Patient states she will go to the OB ED today for evaluation.

## 2025-03-10 NOTE — TELEPHONE ENCOUNTER
"Reason for Disposition   Shock suspected (e.g., cold/pale/clammy skin, too weak to stand, low BP, rapid pulse)    Additional Information   Negative: Passed out (e.g., fainted, lost consciousness, blacked out and was not responding)    Protocols used: Heart Rate and Heartbeat [id number]  Pt states she gave birth on 3/7/25. States she has nausea and vomiting and feels too week to stand. States she feels like her heart is racing. Spouse states she is clammy and "pale as a ghost". Advised per the Triage protocol to dial 911 for EMS. Pt verbalized understanding.  "

## 2025-03-10 NOTE — ED PROVIDER NOTES
Encounter Date: 3/10/2025       History   No chief complaint on file.    Bozena Queen is a 34 y.o. [id number] PPD#3 s/p  complaining of nausea and vomiting.     Patient has been having nausea and vomiting for the past 48 hours.   She states that she was able to eat one meal after delivery, but has since vomited after every meal. She has about 3-4 episodes of emesis per day.   She states she was having left sided abdominal pain, but it has now migrated to her epigastric region.   She denies fevers/chills. She has been having a bowel movement daily since delivery. This morning she had an episode of diarrhea.                   Review of patient's allergies indicates:  No Known Allergies  Past Medical History:   Diagnosis Date    Obesity (BMI 30-39.9) 2019    RUQ abdominal pain 2019     No past surgical history on file.  Family History   Problem Relation Name Age of Onset    Cancer Mother      Hypertension Mother      No Known Problems Sister      No Known Problems Brother       Social History[1]  Review of Systems   Constitutional:  Negative for chills and fatigue.   HENT:  Negative for congestion and sore throat.    Eyes:  Negative for visual disturbance.   Respiratory:  Negative for chest tightness and shortness of breath.    Cardiovascular:  Negative for chest pain and leg swelling.   Gastrointestinal:  Positive for abdominal pain, diarrhea, nausea and vomiting.   Genitourinary:  Negative for dysuria, vaginal bleeding and vaginal discharge.   Neurological:  Negative for headaches.       Physical Exam     Initial Vitals   BP Pulse Resp Temp SpO2   03/10/25 1353 03/10/25 1352 03/10/25 1353 03/10/25 1353 03/10/25 1352   (!) 179/79 71 18 97.7 °F (36.5 °C) 100 %      MAP       --                Physical Exam    Vitals reviewed.  Constitutional: She appears well-developed and well-nourished. No distress.   HENT:   Head: Normocephalic and atraumatic.   Neck: Neck supple.   Normal range of  motion.  Pulmonary/Chest: No respiratory distress.   Abdominal: Abdomen is soft. There is no abdominal tenderness. There is no rebound and no guarding.   Musculoskeletal:      Cervical back: Normal range of motion and neck supple.     Neurological: She is alert and oriented to person, place, and time.   Skin: Skin is warm and dry.   Psychiatric: She has a normal mood and affect. Her behavior is normal. Thought content normal.         ED Course   Procedures  Labs Reviewed   CBC W/ AUTO DIFFERENTIAL - Abnormal       Result Value    WBC 11.22      RBC 3.16 (*)     Hemoglobin 9.7 (*)     Hematocrit 28.7 (*)     MCV 91      MCH 30.7      MCHC 33.8      RDW 13.3      Platelets 310      MPV 9.6      Immature Granulocytes 0.5      Gran # (ANC) 9.0 (*)     Immature Grans (Abs) 0.06 (*)     Lymph # 1.4      Mono # 0.7      Eos # 0.0      Baso # 0.04      nRBC 0      Gran % 80.2 (*)     Lymph % 12.4 (*)     Mono % 6.1      Eosinophil % 0.4      Basophil % 0.4      Differential Method Automated     COMPREHENSIVE METABOLIC PANEL - Abnormal    Sodium 139      Potassium 3.6      Chloride 107      CO2 22 (*)     Glucose 80      BUN 8      Creatinine 0.7      Calcium 8.7      Total Protein 6.8      Albumin 2.8 (*)     Total Bilirubin 0.2      Alkaline Phosphatase 100      AST 17      ALT 20      eGFR >60      Anion Gap 10     MAGNESIUM    Magnesium 1.7     PHOSPHORUS    Phosphorus 3.7     B-TYPE NATRIURETIC PEPTIDE    BNP 55            Imaging Results              CT Abdomen Pelvis With IV Contrast NO Oral Contrast (Final result)  Result time 03/10/25 19:52:28   Procedure changed from CT Abdomen Pelvis W Wo Contrast     Final result by Terrell Sibley MD (03/10/25 19:52:28)                   Impression:      1. Upper abdominal mild inflammatory changes noted about the distal gastric body/antrum, proximal duodenum/C-loop and also pancreatic head, with apparent circumferential wall thickening of the distal gastric body/antrum and  also duodenum.  Findings can be seen with a nonspecific distal gastritis or duodenitis including peptic ulcer disease versus regional pancreatitis.  No evidence for bowel perforation or drainable abscess.  2. Right upper quadrant solitary borderline enlarged lymph node near the duodenum, likely reactive.  3. Hepatosplenomegaly.  4. Cholelithiasis.  5. Enlarged and heterogeneous uterus with complex fluid and gas throughout the uterine cavity, presumably related to reported history of relatively recent spontaneous vaginal delivery approximately 3 days prior to today's study.  Clinical correlation advised.  Further evaluation/follow-up as warranted.  6. Other incidental/nonemergent findings in the body of the report.  Preliminary findings were discussed with Dr. Ricardo Gates with confirmation via [employer] secure chat at approximately 1950 hours on 03/10/2025.      Electronically signed by: Terrell Sibley MD  Date:    03/10/2025  Time:    19:52               Narrative:    EXAMINATION:  CT ABDOMEN PELVIS WITH IV CONTRAST    CLINICAL HISTORY:  Abdominal pain, acute, nonlocalized;    TECHNIQUE:  Low dose axial images, sagittal and coronal reformations were obtained from the lung bases to the pubic symphysis following the IV administration of 100 mL of Omnipaque 350 .  Oral contrast was not given.    COMPARISON:  Right upper quadrant ultrasound 05/22/2022    FINDINGS:  Mm solid nodule in the lingula and 3 mm peripheral solid nodule in the right lower lobe consolidation or sizable pleural effusion at the imaged lung bases.  Base of the is within normal limits.    Small hiatal hernia.    Stomach is mildly distended with mostly ingested liquid contents.  Apparent circumferential wall thickening of the distal gastric body/antrum with subtle adjacent inflammatory change/fat stranding along its inferior most aspect.  Apparent circumferential wall thickening of the duodenum mild fat stranding/inflammatory change.  There is a 10 mm short  axis enhancing nodule along the lateral aspect of the duodenal C-loop likely reactive lymph node.    Superior most aspect of the hepatic dome was excluded limiting evaluation.  Liver is enlarged measuring at least 23.5 cm in coronal length without focal process of the imaged portions.    Cholelithiasis without evidence of acute cholecystitis.  No significant biliary ductal dilatation.    Spleen mildly enlarged without focal process.  Small accessory splenule noted.    No discrete pancreatic mass or ductal dilatation.  Subtle stranding about the pancreatic head.    Bilateral adrenal glands are within normal limits.    Bilateral kidneys are normal in size, shape and location with symmetric normal enhancement.  No hydronephrosis or significant perinephric stranding.  Left renal interpolar subcentimeter hypoattenuating parenchymal focus which is too small to characterize.    Urinary bladder is suboptimally distended but does demonstrate circumferential wall thickening and perivesicular stranding.    The uterus is markedly enlarged with heterogeneous parenchymal enhancement.  There is complex fluid with scattered gas foci throughout the uterine cavity with scattered ill-defined linear hyperattenuating regions that may reflect blood products versus hyperemia.  No surrounding inflammatory change.  No adnexal mass.  There is small volume nonspecific free pelvic fluid.  Mild presacral edema, nonspecific.    Mild fatty hypertrophy of the ileocecal valve.  Terminal ileum and appendix are within normal limits.  No evidence of bowel obstruction.  Remaining small and large loops of bowel are within normal limits.  No bowel pneumatosis or portal venous gas.    No significant atherosclerosis.  No aortic aneurysm or dissection.    Small fat containing umbilical hernia.  Osseous structures show minimal degenerative change without acute or destructive process seen.                                       Medications   acetaminophen  tablet 650 mg (has no administration in time range)   docusate sodium capsule 200 mg (has no administration in time range)   ferrous sulfate tablet 1 each (has no administration in time range)   sodium chloride 0.9% flush 10 mL (has no administration in time range)   lactated ringers infusion ( Intravenous New Bag 3/10/25 2123)   ondansetron injection 8 mg (8 mg Intravenous Given 3/11/25 0045)   prochlorperazine injection Soln 2.5 mg (has no administration in time range)   pantoprazole injection 40 mg (40 mg Intravenous Given 3/11/25 0039)   sucralfate 100 mg/mL suspension 1 g (has no administration in time range)   dextrose 5% lactated ringers bolus 1,000 mL (0 mLs Intravenous Stopped 3/10/25 1605)   simethicone chewable tablet 80 mg (80 mg Oral Given 3/10/25 1625)   iohexoL (OMNIPAQUE 350) injection 100 mL (100 mLs Intravenous Given 3/10/25 1756)     Medical Decision Making  Bozena Queen is a 34 y.o. [id number] PPD#3 s/p  complaining of nausea and vomiting.     Temp:  [97.7 °F (36.5 °C)-98.5 °F (36.9 °C)] 98.5 °F (36.9 °C)  Pulse:  [54-78] 65  Resp:  [18-20] 18  SpO2:  [97 %-100 %] 98 %  BP: (121-179)/(60-79) 121/61     PE: abdomen soft, non-distended, slightly tender to palpitation in epigastric region     Abdominal pain:   -CBC, CMP, Mag, Phos unremarkable  -1L D5LR   -IV zofran   -CT A/P with multiple large gall stones, HSM, see read above  -Patient NPO   - General surgery consulted. Recommend inpatient admission for management of n/v in the setting of gastritis. Report choledocholithiasis stable and does not need acute surgical interventions  - Will admit to post-partum unit  - Full H&P to follow        Amount and/or Complexity of Data Reviewed  Labs: ordered.  Radiology: ordered.    Risk  OTC drugs.  Prescription drug management.              Attending Attestation:   Physician Attestation Statement for Resident:  As the supervising MD   Physician Attestation Statement: I have personally seen and examined this  patient.   I agree with the above history.  -:   As the supervising MD I agree with the above PE.     As the supervising MD I agree with the above treatment, course, plan, and disposition.   -: I agree with the above edited resident note. Pt seen and examined, chart and labs reviewed.    Briefly, 33 yo  PPD#3 s/p SPONTANEOUS VAGINAL DELIVERY presenting for intermittent abd pain with N/V since delivery. Afebrile, elevated BP but known gHTN. Labs obtained including CBC, CMP, Mg and Phos stable from hospital stay, no electrolyte repletion indicated, LFTs WNL, anemia stable. IV D5 and zofran given. CT A/P obtained and read as above, multiple findings. General surgery consulted due to pts gallstones and suspected symptomatology secondary to same; recommendation for inpt mgmt of gastritis given, not requiring acute surgical interventions at this time. Consents signed.     All questions answered. Admit to [facility] for further mgmt.     Radha Garcia MD  OB Hospitalist  3/11/2025     I was personally present during the critical portions of the procedure(s) performed by the resident and was immediately available in the ED to provide services and assistance as needed during the entire procedure.  I have reviewed and agree with the residents interpretation of the following: lab data.  I have reviewed the following: old records at this facility.                ED Course as of 03/11/25 0241   Mon Mar 10, 2025   173 BP(!): 149/68 [AW]   1736 Pulse: 69 [AW]   1736 SpO2: 99 % [AW]   1737 CBC auto differential(!) [AW]   1737 Hemoglobin(!): 9.7 [AW]   1737 Hematocrit(!): 28.7 [AW]   1737 WBC: 11.22 [AW]   1737 Platelet Count: 310 [AW]   1737 Comprehensive metabolic panel(!) [AW]   1737 Sodium: 139 [AW]   1737 Potassium: 3.6 [AW]   1737 Chloride: 107 [AW]   1737 Creatinine: 0.7 [AW]   1737 AST: 17 [AW]   1737 ALT: 20 [AW]   1737 Magnesium [AW]   1737 Magnesium : 1.7 [AW]   1737 Phosphorus [AW]   1737 Phosphorus Level: 3.7 [AW]    1737 Brain natriuretic peptide [AW]   1737 BNP: 55 [AW]      ED Course User Index  [AW] Beatris Blank MD                           Clinical Impression:  Final diagnoses:  [Z39.2] Postpartum state  [R11.2] Nausea and vomiting, unspecified vomiting type (Primary)  [R10.9] Abdominal pain          ED Disposition Condition    Observation                   Amparo Lunsford MD  Resident  03/11/25 2247         [1]   Social History  Tobacco Use    Smoking status: Never     Passive exposure: Never    Smokeless tobacco: Never   Substance Use Topics    Alcohol use: Not Currently     Alcohol/week: 2.0 standard drinks of alcohol     Types: 2 Glasses of wine per week    Drug use: No        Radha Garcia MD  03/11/25 4086

## 2025-03-11 VITALS
DIASTOLIC BLOOD PRESSURE: 68 MMHG | RESPIRATION RATE: 18 BRPM | HEART RATE: 56 BPM | TEMPERATURE: 98 F | OXYGEN SATURATION: 99 % | SYSTOLIC BLOOD PRESSURE: 148 MMHG

## 2025-03-11 PROBLEM — R11.2 NAUSEA AND VOMITING: Status: ACTIVE | Noted: 2025-03-11

## 2025-03-11 PROBLEM — R10.12 LEFT UPPER QUADRANT ABDOMINAL PAIN: Status: ACTIVE | Noted: 2025-03-11

## 2025-03-11 PROBLEM — K80.20 GALLSTONES WITHOUT OBSTRUCTION OF GALLBLADDER: Status: ACTIVE | Noted: 2025-03-11

## 2025-03-11 PROCEDURE — G0378 HOSPITAL OBSERVATION PER HR: HCPCS

## 2025-03-11 PROCEDURE — 63600175 PHARM REV CODE 636 W HCPCS

## 2025-03-11 PROCEDURE — 25000003 PHARM REV CODE 250

## 2025-03-11 PROCEDURE — 96361 HYDRATE IV INFUSION ADD-ON: CPT

## 2025-03-11 PROCEDURE — 99232 SBSQ HOSP IP/OBS MODERATE 35: CPT | Mod: ,,, | Performed by: OBSTETRICS & GYNECOLOGY

## 2025-03-11 PROCEDURE — 96375 TX/PRO/DX INJ NEW DRUG ADDON: CPT

## 2025-03-11 PROCEDURE — 96374 THER/PROPH/DIAG INJ IV PUSH: CPT

## 2025-03-11 PROCEDURE — 96376 TX/PRO/DX INJ SAME DRUG ADON: CPT

## 2025-03-11 RX ORDER — PROCHLORPERAZINE EDISYLATE 5 MG/ML
2.5 INJECTION INTRAMUSCULAR; INTRAVENOUS EVERY 6 HOURS PRN
Status: DISCONTINUED | OUTPATIENT
Start: 2025-03-11 | End: 2025-03-11 | Stop reason: HOSPADM

## 2025-03-11 RX ORDER — PANTOPRAZOLE SODIUM 40 MG/1
40 TABLET, DELAYED RELEASE ORAL DAILY
Qty: 90 TABLET | Refills: 3 | Status: SHIPPED | OUTPATIENT
Start: 2025-03-11 | End: 2026-03-11

## 2025-03-11 RX ADMIN — ONDANSETRON 8 MG: 2 INJECTION INTRAMUSCULAR; INTRAVENOUS at 06:03

## 2025-03-11 RX ADMIN — ONDANSETRON 8 MG: 2 INJECTION INTRAMUSCULAR; INTRAVENOUS at 12:03

## 2025-03-11 RX ADMIN — PANTOPRAZOLE SODIUM 40 MG: 40 INJECTION, POWDER, FOR SOLUTION INTRAVENOUS at 12:03

## 2025-03-11 RX ADMIN — SODIUM CHLORIDE, POTASSIUM CHLORIDE, SODIUM LACTATE AND CALCIUM CHLORIDE: 600; 310; 30; 20 INJECTION, SOLUTION INTRAVENOUS at 11:03

## 2025-03-11 RX ADMIN — PANTOPRAZOLE SODIUM 40 MG: 40 INJECTION, POWDER, FOR SOLUTION INTRAVENOUS at 09:03

## 2025-03-11 NOTE — PLAN OF CARE
S/P  day 4 with multiple episodes of nausea and vomiting in  OLIVIA and admitted to [facility] @ 2145H. With continuous IVF LR infusing @75mL/hr. On initial assessment, patient claimed she is still nauseated, with tolerable abdominal discomfort 3/10. Vitals signs stable. IV protonix and IV zofran given as ordered.  Safety maintained.

## 2025-03-11 NOTE — H&P
[facility]  Obstetrics & Gynecology  History & Physical    Patient Name: Bozena Queen  MRN: [id number]  Admission Date: 3/10/2025  Primary Care Provider: Jon Mckay MD    Subjective:     Chief Complaint/Reason for Admission: gastroduodenitis     History of Present Illness: Bozena Queen is a 34 y.o. [id number] PPD#3 s/p  complaining of nausea and vomiting.      Patient has been having nausea and vomiting for the past 48 hours.   She states that she was able to eat one meal after delivery, but has since vomited after every meal. She has about 3-4 episodes of emesis per day.   She states she was having left sided abdominal pain, but it has now migrated to her epigastric region.   She denies fevers/chills. She has been having a bowel movement daily since delivery. This morning she had an episode of diarrhea.     No current facility-administered medications on file prior to encounter.     Current Outpatient Medications on File Prior to Encounter   Medication Sig    acetaminophen (TYLENOL) 325 MG tablet Take 2 tablets (650 mg total) by mouth every 6 (six) hours as needed for Pain.    docusate sodium (COLACE) 100 MG capsule Take 2 capsules (200 mg total) by mouth 2 (two) times daily as needed for Constipation.    ferrous sulfate 325 (65 FE) MG EC tablet Take 1 tablet (325 mg total) by mouth once daily.    ibuprofen (ADVIL,MOTRIN) 600 MG tablet Take 1 tablet (600 mg total) by mouth every 8 (eight) hours as needed for Pain.    ondansetron (ZOFRAN-ODT) 4 MG TbDL Take 2 tablets (8 mg total) by mouth 2 (two) times daily.    prenatal vit no.124/iron/folic (PRENATAL VITAMIN ORAL) Take by mouth.       Review of patient's allergies indicates:  No Known Allergies    Past Medical History:   Diagnosis Date    Obesity (BMI 30-39.9) 2019    RUQ abdominal pain 2019     OB History    Para Term  AB Living   1 1 1   1   SAB IAB Ectopic Multiple Live Births      0 1      # Outcome Date GA Lbr  Rakan/2nd Weight Sex Type Anes PTL Lv   1 Term 03/07/25 37w1d / 02:09 3.46 kg (7 lb 10.1 oz) F Vag-Spont EPI N LATRICE     No past surgical history on file.  Family History       Problem Relation (Age of Onset)    Cancer Mother    Hypertension Mother    No Known Problems Sister, Brother          Tobacco Use    Smoking status: Never     Passive exposure: Never    Smokeless tobacco: Never   Substance and Sexual Activity    Alcohol use: Not Currently     Alcohol/week: 2.0 standard drinks of alcohol     Types: 2 Glasses of wine per week    Drug use: No    Sexual activity: Yes     Partners: Male     Birth control/protection: None     Review of Systems   Constitutional:  Negative for chills and fever.   HENT:  Negative for nasal congestion.    Eyes:  Negative for visual disturbance.   Respiratory:  Negative for shortness of breath.    Cardiovascular:  Negative for chest pain.   Gastrointestinal:  Positive for abdominal pain, bloating, diarrhea, nausea and vomiting.   Genitourinary:  Negative for dysuria, pelvic pain and vaginal bleeding.   Musculoskeletal:  Negative for back pain.   Integumentary:  Negative for rash.   Neurological:  Negative for syncope.   Psychiatric/Behavioral:  Negative for depression.      Objective:     Vital Signs (Most Recent):  Temp: 98.5 °F (36.9 °C) (03/10/25 2317)  Pulse: 65 (03/10/25 2317)  Resp: 18 (03/10/25 2317)  BP: 121/61 (03/10/25 2317)  SpO2: 98 % (03/10/25 2317) Vital Signs (24h Range):  Temp:  [97.7 °F (36.5 °C)-98.5 °F (36.9 °C)] 98.5 °F (36.9 °C)  Pulse:  [54-78] 65  Resp:  [18-20] 18  SpO2:  [97 %-100 %] 98 %  BP: (121-179)/(60-79) 121/61        There is no height or weight on file to calculate BMI.  Patient's last menstrual period was 06/20/2024.    Physical Exam:   Constitutional: She is oriented to person, place, and time. She appears well-developed and well-nourished.   Exam performed with female chaperone present    HENT:   Head: Normocephalic.      Cardiovascular:  Normal rate.              Pulmonary/Chest: Effort normal. No respiratory distress.        Abdominal: Soft. Bowel sounds are normal. She exhibits distension. There is no abdominal tenderness. There is no guarding.   Abdomen soft, mildly distended. Mild tender to palpation in epigastric pain. No pain on palpation of RUQ.              Musculoskeletal: Normal range of motion.       Neurological: She is alert and oriented to person, place, and time.    Skin: Skin is warm and dry.    Psychiatric: She has a normal mood and affect.       Laboratory:  Lab Results   Component Value Date    WBC 11.22 03/10/2025    HGB 9.7 (L) 03/10/2025    HCT 28.7 (L) 03/10/2025    MCV 91 03/10/2025     03/10/2025       CMP  Sodium   Date Value Ref Range Status   03/10/2025 139 136 - 145 mmol/L Final     Potassium   Date Value Ref Range Status   03/10/2025 3.6 3.5 - 5.1 mmol/L Final     Chloride   Date Value Ref Range Status   03/10/2025 107 95 - 110 mmol/L Final     CO2   Date Value Ref Range Status   03/10/2025 22 (L) 23 - 29 mmol/L Final     Glucose   Date Value Ref Range Status   03/10/2025 80 70 - 110 mg/dL Final     BUN   Date Value Ref Range Status   03/10/2025 8 6 - 20 mg/dL Final     Creatinine   Date Value Ref Range Status   03/10/2025 0.7 0.5 - 1.4 mg/dL Final     Calcium   Date Value Ref Range Status   03/10/2025 8.7 8.7 - 10.5 mg/dL Final     Total Protein   Date Value Ref Range Status   03/10/2025 6.8 6.0 - 8.4 g/dL Final     Albumin   Date Value Ref Range Status   03/10/2025 2.8 (L) 3.5 - 5.2 g/dL Final     Total Bilirubin   Date Value Ref Range Status   03/10/2025 0.2 0.1 - 1.0 mg/dL Final     Comment:     For infants and newborns, interpretation of results should be based  on gestational age, weight and in agreement with clinical  observations.    Premature Infant recommended reference ranges:  Up to 24 hours.............<8.0 mg/dL  Up to 48 hours............<12.0 mg/dL  3-5 days..................<15.0 mg/dL  6-29  days.................<15.0 mg/dL       Alkaline Phosphatase   Date Value Ref Range Status   03/10/2025 100 40 - 150 U/L Final     AST   Date Value Ref Range Status   03/10/2025 17 10 - 40 U/L Final     ALT   Date Value Ref Range Status   03/10/2025 20 10 - 44 U/L Final     Anion Gap   Date Value Ref Range Status   03/10/2025 10 8 - 16 mmol/L Final     eGFR   Date Value Ref Range Status   03/10/2025 >60 >60 mL/min/1.73 m^2 Final     Mag 1.7     Phos 3.7     Diagnostic Results:  CT Abdomen Pelvis With IV Contrast NO Oral Contrast  Order: [id number]   Status: Final result       Next appt: None    Test Result Released: Yes (seen)    0 Result Notes  Details    Reading Physician Reading Date Result Priority   Terrell Sibley MD  [phone]  3/10/2025 STAT     Narrative & Impression  EXAMINATION:  CT ABDOMEN PELVIS WITH IV CONTRAST     CLINICAL HISTORY:  Abdominal pain, acute, nonlocalized;     TECHNIQUE:  Low dose axial images, sagittal and coronal reformations were obtained from the lung bases to the pubic symphysis following the IV administration of 100 mL of Omnipaque 350 .  Oral contrast was not given.     COMPARISON:  Right upper quadrant ultrasound 05/22/2022     FINDINGS:  Mm solid nodule in the lingula and 3 mm peripheral solid nodule in the right lower lobe consolidation or sizable pleural effusion at the imaged lung bases.  Base of the is within normal limits.     Small hiatal hernia.     Stomach is mildly distended with mostly ingested liquid contents.  Apparent circumferential wall thickening of the distal gastric body/antrum with subtle adjacent inflammatory change/fat stranding along its inferior most aspect.  Apparent circumferential wall thickening of the duodenum mild fat stranding/inflammatory change.  There is a 10 mm short axis enhancing nodule along the lateral aspect of the duodenal C-loop likely reactive lymph node.     Superior most aspect of the hepatic dome was excluded limiting evaluation.   Liver is enlarged measuring at least 23.5 cm in coronal length without focal process of the imaged portions.     Cholelithiasis without evidence of acute cholecystitis.  No significant biliary ductal dilatation.     Spleen mildly enlarged without focal process.  Small accessory splenule noted.     No discrete pancreatic mass or ductal dilatation.  Subtle stranding about the pancreatic head.     Bilateral adrenal glands are within normal limits.     Bilateral kidneys are normal in size, shape and location with symmetric normal enhancement.  No hydronephrosis or significant perinephric stranding.  Left renal interpolar subcentimeter hypoattenuating parenchymal focus which is too small to characterize.     Urinary bladder is suboptimally distended but does demonstrate circumferential wall thickening and perivesicular stranding.     The uterus is markedly enlarged with heterogeneous parenchymal enhancement.  There is complex fluid with scattered gas foci throughout the uterine cavity with scattered ill-defined linear hyperattenuating regions that may reflect blood products versus hyperemia.  No surrounding inflammatory change.  No adnexal mass.  There is small volume nonspecific free pelvic fluid.  Mild presacral edema, nonspecific.     Mild fatty hypertrophy of the ileocecal valve.  Terminal ileum and appendix are within normal limits.  No evidence of bowel obstruction.  Remaining small and large loops of bowel are within normal limits.  No bowel pneumatosis or portal venous gas.     No significant atherosclerosis.  No aortic aneurysm or dissection.     Small fat containing umbilical hernia.  Osseous structures show minimal degenerative change without acute or destructive process seen.     Impression:     1. Upper abdominal mild inflammatory changes noted about the distal gastric body/antrum, proximal duodenum/C-loop and also pancreatic head, with apparent circumferential wall thickening of the distal gastric  body/antrum and also duodenum.  Findings can be seen with a nonspecific distal gastritis or duodenitis including peptic ulcer disease versus regional pancreatitis.  No evidence for bowel perforation or drainable abscess.  2. Right upper quadrant solitary borderline enlarged lymph node near the duodenum, likely reactive.  3. Hepatosplenomegaly.  4. Cholelithiasis.  5. Enlarged and heterogeneous uterus with complex fluid and gas throughout the uterine cavity, presumably related to reported history of relatively recent spontaneous vaginal delivery approximately 3 days prior to today's study.  Clinical correlation advised.  Further evaluation/follow-up as warranted.  6. Other incidental/nonemergent findings in the body of the report.  Preliminary findings were discussed with Dr. Ricardo Gates with confirmation via [employer] secure chat at approximately 1950 hours on 03/10/2025.        Electronically signed by:Terrell Sibley MD  Date:                                            03/10/2025  Time:                                           19:52        Exam Ended: 03/10/25 18:01 CDT Last Resulted: 03/10/25 19:52 CDT       Assessment/Plan:     Active Diagnoses:    Diagnosis Date Noted POA    PRINCIPAL PROBLEM:  Gastritis [K29.70] 03/10/2025 Yes    Gallstones without obstruction of gallbladder [K80.20] 2025 Yes     (spontaneous vaginal delivery) [O80] 2025 Not Applicable    Gestational hypertension, third trimester [O13.3] 2025 Yes    Anxiety [F41.9] 2025 Yes    Obesity [E66.9] 2023 Yes      Problems Resolved During this Admission:       34 y.o.  PDD#3 from uncomplicated  presents for n/v in the setting of gastroduodenitis.     Gastroduodenitis   - VSS, afebrile   - 3 episodes of NBNB emesis in [place]   - CBC shows mild anemia. Lytes wnl.   - CTAP showed upper abdominal mild inflammatory changes noted about the distal gastric body/antrum, proximal duodenum/C-loop and also pancreatic head, with  apparent circumferential wall thickening of the distal gastric body/antrum and also duodenum. Also showed large cholelithiasis   - Gen surg consulted. Reccommended against acute surgical intervention. Recommended admission for IV fluids and antacid therapy.   Plan:   - Regular diet + LR at 75cc/hr  - Alternating xofran/compazine for nausea   - IV protonix 40mg BID. Carafate PRN  - GI consult in am.     Anemia   - H/H as above  - asymptomatic  - iron/colace    gHTN  - BP as above  - asymptomatic  - preE labs as above  - hypertensive agent not indicated    Mood Disorder  - Mood stable  - Medications: none  - Will need 1-2 week postpartum mood check      Amparo Lunsford MD  Obstetrics & Gynecology  [facility] ([place])

## 2025-03-11 NOTE — CARE UPDATE
Afternoon Assessment:    MD to bedside for PM rounds. Upon entry to room, patient sitting up in bed with family at bedside. Patient reports feeling well. She has tolerated a regular diet for breakfast & lunch with no nausea/vomiting. She reports abdominal pain has continued to improve. She reports GI team seeing her at bedside and discussed continuing PO Protonix with close outpatient follow up. She would like to be discharged home.     Temp:  [97.7 °F (36.5 °C)-98.9 °F (37.2 °C)] 98.3 °F (36.8 °C)  Pulse:  [51-78] 56  Resp:  [16-20] 18  SpO2:  [97 %-100 %] 99 %  BP: (114-179)/(60-83) 148/68    PE:  Abdomen: soft, non distended, no tenderness to palpation     Labs:  Recent Labs   Lab 03/10/25  1504   WBC 11.22   RBC 3.16*   HGB 9.7*   HCT 28.7*      MCV 91   MCH 30.7   MCHC 33.8       A/P:  - VSS & WNL  - PE as above  - Plan to DC home this afternoon     Beatris Blank MD  OB/GYN PGY-2

## 2025-03-11 NOTE — NURSING
The following message was sent to dr cowart's staff:   Hi, can you please call ms angelo to set up a post partum blood pressure and mood check appt on or before Tuesday 3/18. Thank you   Rossy Cowart MD Follow up in 1 week(s).   Specialty: Obstetrics and Gynecology   Why: Post partum blood pressure check

## 2025-03-11 NOTE — DISCHARGE SUMMARY
[facility] ([place])  Obstetrics & Gynecology  Discharge Summary    Patient Name: Bozena Queen  MRN: [id number]  Admission Date: 3/10/2025  Hospital Length of Stay: 0 days  Discharge Date and Time:  2025 12:48 PM  Attending Physician: Rossy Miles MD   Discharging Provider: Beatris Blank MD  Primary Care Provider: Jon Mckay MD    HPI: Bozena Queen is a 34 y.o. [id number] PPD#3 s/p  complaining of nausea and vomiting.      Patient has been having nausea and vomiting for the past 48 hours.   She states that she was able to eat one meal after delivery, but has since vomited after every meal. She has about 3-4 episodes of emesis per day.   She states she was having left sided abdominal pain, but it has now migrated to her epigastric region.   She denies fevers/chills. She has been having a bowel movement daily since delivery. This morning she had an episode of diarrhea.     Hospital Course: Patient admitted for IV antiemetics & IV fluids. On HD#2, nausea/vomiting greatly improved. Abdominal pain improved as well. Patient able to tolerate a regular diet with no nausea/vomiting. GI consulted & recommended continuation of PO Protonix as well as antiemetics PRN in addition to close outpatient follow up. Patient stable for discharge home.     * No surgery found *     Consults:   Consults (From admission, onward)          Status Ordering Provider     Inpatient consult to Gastroenterology  Once        Provider:  (Not yet assigned)    BEATRIS Fletcher     Inpatient consult to General surgery  Once        Provider:  (Not yet assigned)    BENNETT Quinones            Significant Diagnostic Studies:   Recent Labs   Lab 25  1052 25  0409 03/10/25  1504   WBC 12.26 20.26* 11.22   HGB 11.4* 9.9* 9.7*   HCT 34.4* 30.1* 28.7*   MCV 90 91 91    260 310       Recent Labs   Lab 25  1052 03/10/25  1504   * 139   K 3.8 3.6    107   CO2 17* 22*   BUN 6 8    CREATININE 0.6 0.7   GLU 86 80   PROT 7.2 6.8   BILITOT 0.3 0.2   ALKPHOS 119 100   ALT 20 20   AST 18 17   MG  --  1.7   PHOS  --  3.7       CT AP 3/10: Upper abdominal mild inflammatory changes noted about the distal gastric body/antrum, proximal duodenum/C-loop and also pancreatic head, with apparent circumferential wall thickening of the distal gastric body/antrum and also duodenum.  No evidence for bowel perforation or drainable abscess. Right upper quadrant solitary borderline enlarged lymph node near the duodenum, likely reactive. Hepatosplenomegaly. Cholelithiasis. Enlarged and heterogeneous uterus with complex fluid and gas throughout the uterine cavity, presumably related to reported history of relatively recent spontaneous vaginal delivery approximately 3 days prior to today's study.      Pending Diagnostic Studies:       None          Final Active Diagnoses:    Diagnosis Date Noted POA    PRINCIPAL PROBLEM:  Gastritis [K29.70] 03/10/2025 Yes    Gallstones without obstruction of gallbladder [K80.20] 2025 Yes     (spontaneous vaginal delivery) [O80] 2025 Not Applicable    Gestational hypertension, third trimester [O13.3] 2025 Yes    Anxiety [F41.9] 2025 Yes    Obesity [E66.9] 2023 Yes      Problems Resolved During this Admission:        Discharged Condition: good    Disposition: Home or Self Care    Follow Up:   Follow-up Information       Rossy Miles MD Follow up in 1 week(s).    Specialty: Obstetrics and Gynecology  Why: Post partum blood pressure check  Contact information:  [address]  [address]  [place] [address] [phone]               [facility]. Schedule an appointment as soon as possible for a visit.    Specialty: Gastroenterology  Why: Hospital follow up  Contact information:  [address]  [address]  [place] [address] [phone]                           Patient Instructions:       Ambulatory referral/consult to Gastroenterology   Standing Status: Future   Referral Priority: Routine Referral Type: Consultation   Referral Reason: Specialty Services Required   Requested Specialty: Gastroenterology   Number of Visits Requested: 1     Diet Adult Regular     Diet Adult Regular     Notify your health care provider if you experience any of the following:  temperature >100.4     Notify your health care provider if you experience any of the following:  persistent nausea and vomiting or diarrhea     Notify your health care provider if you experience any of the following:  severe uncontrolled pain     Notify your health care provider if you experience any of the following:  difficulty breathing or increased cough     Notify your health care provider if you experience any of the following:  persistent dizziness, light-headedness, or visual disturbances     No driving until:   Order Comments: No driving until not taking narcotic pain medication.     Pelvic Rest   Order Comments: Pelvic rest until 6 weeks after discharge. Nothing in vagina -no sex, tampons, douching, etc.     Notify your health care provider if you experience any of the following:  temperature >100.4     Notify your health care provider if you experience any of the following:  persistent nausea and vomiting or diarrhea     Notify your health care provider if you experience any of the following:  severe uncontrolled pain     Notify your health care provider if you experience any of the following:  redness, tenderness, or signs of infection (pain, swelling, redness, odor or green/yellow discharge around incision site)     Notify your health care provider if you experience any of the following:  difficulty breathing or increased cough     Notify your health care provider if you experience any of the following:  severe persistent headache     Notify your health care provider if you experience any of the following:  worsening rash     Notify  your health care provider if you experience any of the following:  persistent dizziness, light-headedness, or visual disturbances     Notify your health care provider if you experience any of the following:  increased confusion or weakness     Notify your health care provider if you experience any of the following:   Order Comments: Heavy vaginal bleeding saturating more than 1 pad per hr for at least consecutive 2 hrs.     Activity as tolerated     Activity as tolerated     Medications:  Reconciled Home Medications:      Medication List        START taking these medications      pantoprazole 40 MG tablet  Commonly known as: PROTONIX  Take 1 tablet (40 mg total) by mouth once daily.            CONTINUE taking these medications      acetaminophen 325 MG tablet  Commonly known as: TYLENOL  Take 2 tablets (650 mg total) by mouth every 6 (six) hours as needed for Pain.     docusate sodium 100 MG capsule  Commonly known as: COLACE  Take 2 capsules (200 mg total) by mouth 2 (two) times daily as needed for Constipation.     ondansetron 4 MG Tbdl  Commonly known as: ZOFRAN-ODT  Take 2 tablets (8 mg total) by mouth 2 (two) times daily.     PRENATAL VITAMIN ORAL  Take by mouth.            STOP taking these medications      ferrous sulfate 325 (65 FE) MG EC tablet     ibuprofen 600 MG tablet  Commonly known as: COLBY VAN MD  Obstetrics & Gynecology  [facility] ([place])

## 2025-03-11 NOTE — PROGRESS NOTES
I saw the patient earlier today around noon before she left.  She was feeling much better.  She had no pain currently and was tolerating liquids without nausea    Abdomen soft nontender nondistended; improving    Okay for diet advancement and discharge home from my standpoint.  Follow GI consult recommendations after discharge,  Follow up with me as needed when she is ready for cholecystectomy as she has gallstones

## 2025-03-11 NOTE — PLAN OF CARE
Discharge teaching provided, patient verbalized understanding. VSS. S/p general surgery and GI consult. Patient ambulating and voiding independently and without difficulty. Incision site intact with no signs or symptoms of infection. Pain controlled without PRN pain medications. Pumping every 2-3 hours. No further concerns at this time.

## 2025-03-11 NOTE — PROGRESS NOTES
[facility]  Obstetrics & Gynecology  Progress Note    Patient Name: Bozena Queen  MRN: [id number]  Admission Date: 3/10/2025  Primary Care Provider: Jon Mckay MD  Principal Problem: Gastritis    Subjective:   34 y.o.  PPD#4 from uncomplicated  admitted for treatment of  n/v in the setting of gastroduodenitis.     Interval History: Patient reports doing well overnight. She received one dose of IV Zofran 8mg & IV Protonix 40mg. IV compazine was not given due to patient sleeping. She reports nausea/vomiting has greatly improved. She has not had an episode of emesis since yesterday in the [place]. She was able to tolerate some water overnight. She has not attempted any other PO intake yet. She reports continued epigastric pain but states it has improved since yesterday. She reports liquid diarrhea and was unable to make it to the bathroom on one occasion. She reports vaginal bleeding is becoming lighter, she is ambulating and voiding without difficulty.     Scheduled Meds:   ferrous sulfate  1 tablet Oral Daily    ondansetron  8 mg Intravenous Q6H    pantoprazole  40 mg Intravenous BID    prochlorperazine  2.5 mg Intravenous Q6H     Continuous Infusions:   lactated ringers   Intravenous Continuous 75 mL/hr at 03/10/25 2123 New Bag at 03/10/25 2123     PRN Meds:  Current Facility-Administered Medications:     acetaminophen, 650 mg, Oral, Q6H PRN    docusate sodium, 200 mg, Oral, BID PRN    sodium chloride 0.9%, 10 mL, Intravenous, Q6H PRN    sucralfate, 1 g, Oral, Q6H PRN    Review of patient's allergies indicates:  No Known Allergies    Objective:     Vital Signs (Most Recent):  Temp: 98.9 °F (37.2 °C) (25)  Pulse: 67 (25)  Resp: 16 (25)  BP: 114/74 (25)  SpO2: 97 % (25) Vital Signs (24h Range):  Temp:  [97.7 °F (36.5 °C)-98.9 °F (37.2 °C)] 98.9 °F (37.2 °C)  Pulse:  [54-78] 67  Resp:  [16-20] 16  SpO2:  [97 %-100 %] 97 %  BP:  (114-179)/(60-79) 114/74        There is no height or weight on file to calculate BMI.  Patient's last menstrual period was 06/20/2024.    I&O (Last 24H):  No intake or output data in the 24 hours ending 03/11/25 0625    Physical Exam:   Constitutional: She is oriented to person, place, and time. She appears well-developed and well-nourished. No distress.    HENT:   Head: Normocephalic and atraumatic.    Eyes: EOM are normal.     Cardiovascular:  Normal rate.             Pulmonary/Chest: Effort normal. No respiratory distress.        Abdominal: Soft. She exhibits no distension. There is no abdominal tenderness. There is no rebound and no guarding.             Musculoskeletal: Normal range of motion and moves all extremeties.       Neurological: She is alert and oriented to person, place, and time.    Skin: Skin is warm and dry.    Psychiatric: She has a normal mood and affect. Her behavior is normal.       Laboratory:  Recent Labs   Lab 03/06/25  1052 03/08/25  0409 03/10/25  1504   WBC 12.26 20.26* 11.22   HGB 11.4* 9.9* 9.7*   HCT 34.4* 30.1* 28.7*   MCV 90 91 91    260 310       Recent Labs   Lab 03/06/25  1052 03/10/25  1504   * 139   K 3.8 3.6    107   CO2 17* 22*   BUN 6 8   CREATININE 0.6 0.7   GLU 86 80   PROT 7.2 6.8   BILITOT 0.3 0.2   ALKPHOS 119 100   ALT 20 20   AST 18 17   MG  --  1.7   PHOS  --  3.7       Diagnostic Results:  CT AP 3/10: Upper abdominal mild inflammatory changes noted about the distal gastric body/antrum, proximal duodenum/C-loop and also pancreatic head, with apparent circumferential wall thickening of the distal gastric body/antrum and also duodenum.  No evidence for bowel perforation or drainable abscess. Right upper quadrant solitary borderline enlarged lymph node near the duodenum, likely reactive. Hepatosplenomegaly. Cholelithiasis. Enlarged and heterogeneous uterus with complex fluid and gas throughout the uterine cavity, presumably related to reported  history of relatively recent spontaneous vaginal delivery approximately 3 days prior to today's study.    Assessment/Plan:     Active Diagnoses:    Diagnosis Date Noted POA    PRINCIPAL PROBLEM:  Gastritis [K29.70] 03/10/2025 Yes    Gallstones without obstruction of gallbladder [K80.20] 2025 Yes     (spontaneous vaginal delivery) [O80] 2025 Not Applicable    Gestational hypertension, third trimester [O13.3] 2025 Yes    Anxiety [F41.9] 2025 Yes    Obesity [E66.9] 2023 Yes      Problems Resolved During this Admission:       34 y.o.  PPD#4 from uncomplicated  presents for n/v in the setting of gastroduodenitis.      Gastroduodenitis   - VSS, afebrile   - 3 episodes of NBNB emesis in [place]   - CBC shows mild anemia. Lytes wnl.   - CTAP showed upper abdominal mild inflammatory changes noted about the distal gastric body/antrum, proximal duodenum/C-loop and also pancreatic head, with apparent circumferential wall thickening of the distal gastric body/antrum and also duodenum. Also showed large cholelithiasis   - Gen surg consulted. Reccommended against acute surgical intervention. Recommended admission for IV fluids and antacid therapy.   Plan:   - Regular diet + LR at 75cc/hr  - Scheduled zofran for nausea   - Patient requests compazine to be transitioned to PRN  - IV protonix 40mg BID. Carafate PRN  - GI consult in AM.      Anemia   - H/H as above  - asymptomatic  - iron/colace     gHTN  - BP as above  - asymptomatic  - preE labs as above  - hypertensive agent not indicated     Mood Disorder  - Mood stable  - Medications: none  - Will need 1-2 week postpartum mood check      Beatris Blank MD  Obstetrics & Gynecology  [facility] ([place])

## 2025-03-11 NOTE — CONSULTS
[facility] Emergency Dept (Obstetrics)  General Surgery  Consult Note    Date of Consultation:3/10/2025    SUBJECTIVE:     History of Present Illness:  Patient is a 34 y.o. female  postpartum day #3 s/p  presents back to OB ED after discharge from the hospital yesterday.  The morning after nausea and vomiting listed for 24 hours and then waned somewhat and she was able to tolerate liquids and was discharged.  Since discharge the nausea and vomiting has persisted.  She had mild pain on the left side of her abdomen and less so in the epigastrium.  She has known gallstones going back 4 years.  Back then she had right upper quadrant pain which she is not experiencing now.  She has not had any gallstone related issues since that time.  Pain has never been her primary complaint over the past 4 days. She has been taking prenatal vitamins including extra iron supplement.  She has been suffering with reflux from this.  After delivery she was also placed on ibuprofen.      CT scan is consistent with gastroduodenitis.  Although she has multiple large gallstones there was no gallbladder distention or wall thickening or pericholecystic fluid or edema edema    No chief complaint on file.      Review of patient's allergies indicates:  No Known Allergies    Current Medications[1]    Past Medical History:   Diagnosis Date    Obesity (BMI 30-39.9) 2019    RUQ abdominal pain 2019     No past surgical history on file.  Family History   Problem Relation Name Age of Onset    Cancer Mother      Hypertension Mother      No Known Problems Sister      No Known Problems Brother       Social History[2]     Review of Systems:  Review of Systems  Only complaint at this time is nausea and vomiting.  No other complaints in other systems  OBJECTIVE:     Vital Signs (Most Recent)  Temp: 97.7 °F (36.5 °C) (03/10/25 1353)  Pulse: 66 (03/10/25 1539)  Resp: 18 (03/10/25 1353)  BP: (!) 149/68 (03/10/25 1538)  SpO2: 99 % (03/10/25  1539)           Physical Exam:  Physical Exam  General no apparent distress   Eyes nonicteric sclera   Abdomen is soft no outward signs of tenderness.  Negative Wade's sign.  No right upper quadrant tenderness.  She expresses some discomfort with deep palpation in the epigastrium and left upper quadrant    Laboratory    CBC:   Recent Labs   Lab 03/10/25  1504   WBC 11.22   RBC 3.16*   HGB 9.7*   HCT 28.7*      MCV 91   MCH 30.7   MCHC 33.8     CMP:   Recent Labs   Lab 03/10/25  1504   GLU 80   CALCIUM 8.7   ALBUMIN 2.8*   PROT 6.8      K 3.6   CO2 22*      BUN 8   CREATININE 0.7   ALKPHOS 100   ALT 20   AST 17   BILITOT 0.2     Diagnostic Results:    CT: I have reviewed all pertinent results/findings within the past 24 hours.  See HPI      ASSESSMENT/PLAN:     Postpartum day 3 with gastroduodenitis  Persistent nausea and vomiting    At this point it seems less likely related to the gallbladder considering the CT scan findings and her symptoms.  LFTs are completely normal as well.    PLAN:Plan     Had a long discussion with the patient and her mother.  We discussed the CT scan findings.  I also included illustrations for clarity.  I do not feel that removing her gallbladder, at this juncture, would add any benefit to her recovery.  Although gallstones are present this does not seem to be the  source of her issues    Recommend admission to the hospital, IV fluids, hospital medicine consult and antacid therapy.  I will be happy to follow closely with you while she is in the hospital      Thank you for the opportunity of seeing Bozena Queen in consultation        [1]   Current Facility-Administered Medications   Medication Dose Route Frequency Provider Last Rate Last Admin    ondansetron injection 8 mg  8 mg Intravenous Once Radha Garcia MD         Current Outpatient Medications   Medication Sig Dispense Refill    acetaminophen (TYLENOL) 325 MG tablet Take 2 tablets (650 mg total) by mouth  every 6 (six) hours as needed for Pain. 30 tablet 0    docusate sodium (COLACE) 100 MG capsule Take 2 capsules (200 mg total) by mouth 2 (two) times daily as needed for Constipation. 60 capsule 0    ferrous sulfate 325 (65 FE) MG EC tablet Take 1 tablet (325 mg total) by mouth once daily. 30 tablet 0    ibuprofen (ADVIL,MOTRIN) 600 MG tablet Take 1 tablet (600 mg total) by mouth every 8 (eight) hours as needed for Pain. 30 tablet 0    ondansetron (ZOFRAN-ODT) 4 MG TbDL Take 2 tablets (8 mg total) by mouth 2 (two) times daily. 20 tablet 0    prenatal vit no.124/iron/folic (PRENATAL VITAMIN ORAL) Take by mouth.     [2]   Social History  Tobacco Use    Smoking status: Never     Passive exposure: Never    Smokeless tobacco: Never   Substance Use Topics    Alcohol use: Not Currently     Alcohol/week: 2.0 standard drinks of alcohol     Types: 2 Glasses of wine per week    Drug use: No

## 2025-03-11 NOTE — CONSULTS
Gastroenterology Consult    3/11/2025  1:24 PM    Consulting Physician:  Ghanshyam Pereyra MD    Primary Care Provider: Jon Mckay MD    Reason for consultation: Abdominal pain    HPI:  Bozena Queen is a 34 y.o. female with a PMHx of obesity presented PPD #3 s/p  with complaints of nausea/vomiting and abdominal pain for 2 days prior to presentation with associated diarrhea.  She was admitted for IV fluids and work up.  She was started on Protonix with gradual improvement in symptoms. As part of her inpatient work up, CT A/P was done that showed mild inflammatory changed so the distal gastric body and duodenal C loop and the pancreatic head.  Also noted to have hepatosplenomegaly and cholelithiasis.    Since admission, she is tolerating diet.  No overt GI blood loss.  She was taking daily ASA 81 mg with intermittent Tylenol/Motrin for pain prior to and at delivery.      No prior history of PUD.    Past Medical History:  Past Medical History:   Diagnosis Date    Obesity (BMI 30-39.9) 2019    RUQ abdominal pain 2019       Allergies:   Review of patient's allergies indicates:  No Known Allergies    Current Medications:  Prescriptions Prior to Admission[1]      Social History:  Social History     Socioeconomic History    Marital status:    Tobacco Use    Smoking status: Never     Passive exposure: Never    Smokeless tobacco: Never   Substance and Sexual Activity    Alcohol use: Not Currently     Alcohol/week: 2.0 standard drinks of alcohol     Types: 2 Glasses of wine per week    Drug use: No    Sexual activity: Yes     Partners: Male     Birth control/protection: None     Social Drivers of Health     Financial Resource Strain: Low Risk  (3/6/2025)    Overall Financial Resource Strain (CARDIA)     Difficulty of Paying Living Expenses: Not hard at all   Food Insecurity: No Food Insecurity (3/6/2025)    Hunger Vital Sign     Worried About Running Out of Food in the Last Year: Never true     Ran  Out of Food in the Last Year: Never true   Transportation Needs: No Transportation Needs (3/6/2025)    PRAPARE - Transportation     Lack of Transportation (Medical): No     Lack of Transportation (Non-Medical): No   Physical Activity: Inactive (3/6/2025)    Exercise Vital Sign     Days of Exercise per Week: 0 days     Minutes of Exercise per Session: 0 min   Stress: No Stress Concern Present (3/6/2025)    [employer] - Occupational Stress Questionnaire     Feeling of Stress : Not at all   Housing Stability: Low Risk  (3/6/2025)    Housing Stability Vital Sign     Unable to Pay for Housing in the Last Year: No     Homeless in the Last Year: No       Surgical History:  No past surgical history on file.      Family History:  Family History   Problem Relation Name Age of Onset    Cancer Mother      Hypertension Mother      No Known Problems Sister      No Known Problems Brother         Review of systems:     CONSTITUTIONAL: Negative for fever, chills, weakness, weight loss, weight gain.  HEENT: Negative for blurred vision, hearing loss, nasal congestion, dry mouth, sore throat.  CARDIOVASCULAR: Negative for chest pain or palpitations.  RESPIRATORY: Negative for SOB or cough.  GASTROINTESTINAL: See HPI  GENITOURINARY: Negative for dysuria or hematuria.  MUSCULOSKELETAL: Negative for osteoarthritis or muscle pain.  SKIN: Negative for rashes/lesions.  NEUROLOGIC: Negative for headaches, numbness/tingling.  ENDOCRINE: Negative for diabetes or thyroid abnormalities.  HEMATOLOGIC: Negative for anemia or blood dyscrasias.  Aside from above positives, complete 10 point review of systems negative.    Physical Exam:  Vital Signs (Most Recent):  Temp: 98.3 °F (36.8 °C) (03/11/25 1308)  Pulse: (!) 56 (03/11/25 1308)  Resp: 18 (03/11/25 1308)  BP: (!) 148/68 (03/11/25 1308)  SpO2: 99 % (03/11/25 1308) Vital Signs (24h Range):  Temp:  [97.7 °F (36.5 °C)-98.9 °F (37.2 °C)] 98.3 °F (36.8 °C)  Pulse:   [51-78] 56  Resp:  [16-20] 18  SpO2:  [97 %-100 %] 99 %  BP: (114-179)/(60-83) 148/68       General: Well developed, well nourished, female in no acute distress.    Eyes:  Anicteric sclera, PERRLA  ENT:  Moist mucous membranes, no drainage from ears or nose, hearing grossly intact  Lymph:  No cervical, supraclavicular or axillary lymphadenopathy  Neck:  Supple, no nodes or masses felt, no thyromegaly  Cardiovascular:  Regular rate and rhythm without murmur  Lungs:  Clear to auscultation with normal effort; no wheezes or rales noted  GI:  soft, obese, NTND  Musculoskeletal:  5/5 strength bilaterally  Extremities: No clubbing, cyanosis, or edema, 2+ dorsalis pedis bilaterally  Neurologic:  No focal deficits, alert and oriented x 3  Psych:  Appropriate mood and affect  Skin:  No rash, no pallor, no lesions       Labs:   Latest Reference Range & Units 03/10/25 15:04   WBC 3.90 - 12.70 K/uL 11.22   RBC 4.00 - 5.40 M/uL 3.16 (L)   Hemoglobin 12.0 - 16.0 g/dL 9.7 (L)   Hematocrit 37.0 - 48.5 % 28.7 (L)   MCV 82 - 98 fL 91   MCH 27.0 - 31.0 pg 30.7   MCHC 32.0 - 36.0 g/dL 33.8   RDW 11.5 - 14.5 % 13.3   Platelet Count 150 - 450 K/uL 310   MPV 9.2 - 12.9 fL 9.6   Gran % 38.0 - 73.0 % 80.2 (H)   Lymph % 18.0 - 48.0 % 12.4 (L)   Mono % 4.0 - 15.0 % 6.1   Eos % 0.0 - 8.0 % 0.4   Basophil % 0.0 - 1.9 % 0.4   (L): Data is abnormally low  (H): Data is abnormally high   Latest Reference Range & Units 03/10/25 15:04   Sodium 136 - 145 mmol/L 139   Potassium 3.5 - 5.1 mmol/L 3.6   Chloride 95 - 110 mmol/L 107   CO2 23 - 29 mmol/L 22 (L)   Anion Gap 8 - 16 mmol/L 10   BUN 6 - 20 mg/dL 8   Creatinine 0.5 - 1.4 mg/dL 0.7   eGFR >60 mL/min/1.73 m^2 >60   Glucose 70 - 110 mg/dL 80   Calcium 8.7 - 10.5 mg/dL 8.7   Phosphorus Level 2.7 - 4.5 mg/dL 3.7   Magnesium  1.6 - 2.6 mg/dL 1.7   ALP 40 - 150 U/L 100   PROTEIN TOTAL 6.0 - 8.4 g/dL 6.8   Albumin 3.5 - 5.2 g/dL 2.8 (L)   BILIRUBIN TOTAL 0.1 - 1.0 mg/dL 0.2   AST 10 - 40 U/L 17   ALT 10  - 44 U/L 20   (L): Data is abnormally low      Imaging and Other Studies:  Personally reviewed    CT ABDOMEN PELVIS WITH IV CONTRAST    Impression:     1. Upper abdominal mild inflammatory changes noted about the distal gastric body/antrum, proximal duodenum/C-loop and also pancreatic head, with apparent circumferential wall thickening of the distal gastric body/antrum and also duodenum.  Findings can be seen with a nonspecific distal gastritis or duodenitis including peptic ulcer disease versus regional pancreatitis.  No evidence for bowel perforation or drainable abscess.  2. Right upper quadrant solitary borderline enlarged lymph node near the duodenum, likely reactive.  3. Hepatosplenomegaly.  4. Cholelithiasis.  5. Enlarged and heterogeneous uterus with complex fluid and gas throughout the uterine cavity, presumably related to reported history of relatively recent spontaneous vaginal delivery approximately 3 days prior to today's study.  Clinical correlation advised.  Further evaluation/follow-up as warranted.  6. Other incidental/nonemergent findings in the body of the report.  Preliminary findings were discussed with Dr. Ricardo Gates with confirmation via [employer] secure chat at approximately 1950 hours on 03/10/2025.        Electronically signed by: Terrell Sibley MD  Date:                                            03/10/2025  Time:                                           19:52    Assessment:  Patient is a 33 yo PPD #3 admitted for abdominal pain, nausea/vomiting.  CT with changes of gastro-duodenitis.  Clinically better with Pantoprazole.      Plan:   Pantoprazole 40 mg BID for 8 weeks, then once daily   PRN use of Zofran   Low residue/low fat diet    OK to discharge per GI with outpatient follow up in 2-4 weeks.      Ghanshyam Pererya         [1]   Medications Prior to Admission   Medication Sig Dispense Refill Last Dose/Taking    acetaminophen (TYLENOL) 325 MG tablet Take 2 tablets (650 mg total) by mouth  every 6 (six) hours as needed for Pain. 30 tablet 0     docusate sodium (COLACE) 100 MG capsule Take 2 capsules (200 mg total) by mouth 2 (two) times daily as needed for Constipation. 60 capsule 0     ondansetron (ZOFRAN-ODT) 4 MG TbDL Take 2 tablets (8 mg total) by mouth 2 (two) times daily. 20 tablet 0     prenatal vit no.124/iron/folic (PRENATAL VITAMIN ORAL) Take by mouth.       [DISCONTINUED] ferrous sulfate 325 (65 FE) MG EC tablet Take 1 tablet (325 mg total) by mouth once daily. 30 tablet 0     [DISCONTINUED] ibuprofen (ADVIL,MOTRIN) 600 MG tablet Take 1 tablet (600 mg total) by mouth every 8 (eight) hours as needed for Pain. 30 tablet 0

## 2025-03-12 ENCOUNTER — TELEPHONE (OUTPATIENT)
Dept: OBSTETRICS AND GYNECOLOGY | Facility: CLINIC | Age: 35
End: 2025-03-12
Payer: COMMERCIAL

## 2025-03-12 NOTE — TELEPHONE ENCOUNTER
Spoke to patient and scheduled postpartum. States she would like to monitor her blood pressure with connected MOM. Denies concerns of ppd/ppa at this time.

## 2025-03-12 NOTE — TELEPHONE ENCOUNTER
----- Message from Tony Beavers sent at 3/11/2025  4:57 PM CDT -----    ----- Message -----  From: Cherelle Morris RN  Sent: 3/11/2025   2:58 PM CDT  To: Jd Blair Staff    Hi, can you please call ms angelo to set up a post partum blood pressure and mood check appt on or before Tuesday 3/18. Thank you Rossy Miles MD Follow up in 1 week(s). Specialty: Obstetrics and GynecologyWhy: Post partum blood pressure check

## 2025-03-13 ENCOUNTER — TELEPHONE (OUTPATIENT)
Dept: ORTHOPEDICS | Facility: CLINIC | Age: 35
End: 2025-03-13
Payer: COMMERCIAL

## 2025-04-16 ENCOUNTER — POSTPARTUM VISIT (OUTPATIENT)
Dept: OBSTETRICS AND GYNECOLOGY | Facility: CLINIC | Age: 35
End: 2025-04-16
Payer: COMMERCIAL

## 2025-04-16 VITALS
WEIGHT: 259.06 LBS | BODY MASS INDEX: 41.63 KG/M2 | HEIGHT: 66 IN | SYSTOLIC BLOOD PRESSURE: 116 MMHG | DIASTOLIC BLOOD PRESSURE: 84 MMHG

## 2025-04-16 DIAGNOSIS — O13.3 GESTATIONAL HYPERTENSION, THIRD TRIMESTER: ICD-10-CM

## 2025-04-16 PROCEDURE — 0503F POSTPARTUM CARE VISIT: CPT | Mod: CPTII,S$GLB,, | Performed by: OBSTETRICS & GYNECOLOGY

## 2025-04-16 PROCEDURE — 99999 PR PBB SHADOW E&M-EST. PATIENT-LVL III: CPT | Mod: PBBFAC,,, | Performed by: OBSTETRICS & GYNECOLOGY

## 2025-04-16 NOTE — PROGRESS NOTES
"CC: Post-partum follow-up    Bozena Queen is a 34 y.o. female  presents for post-partum visit s/p a .  Not having any issues at this time.    Delivery Date: 2025  Delivery MD: Anita Stovall MD  Gender: female  Birth Weight: 7 pounds 10 ounces  Breast Feeding: YES, pumping  Depression: NO  Contraception: undecided.  Pt to let me know if would like to start something.    Pregnancy was complicated by:  Gastroenteritis,  gHTN, MO (pp BMI 43), AMA       /84 (Patient Position: Sitting)   Ht 5' 6" (1.676 m)   Wt 117.5 kg (259 lb 0.7 oz)   LMP 2024   Breastfeeding Yes   BMI 41.81 kg/m²     ROS:  GENERAL: No fever, chills, fatigability or weight loss.  VULVAR: No pain, no lesions and no itching.  VAGINAL: No relaxation, no itching, no discharge, no abnormal bleeding and no lesions.  ABDOMEN: No abdominal pain. Denies nausea. Denies vomiting. No diarrhea. No constipation  BREAST: Denies pain. No lumps. No discharge.  URINARY: No incontinence, no nocturia, no frequency and no dysuria.  CARDIOVASCULAR: No chest pain. No shortness of breath. No leg cramps.  NEUROLOGICAL: No headaches. No vision changes.    PHYSICAL EXAM:  PELVIC: Perineum well healed.  No signs of dehiscence, infection or bleeding.  Non-tender         Diagnosis:  1.  (spontaneous vaginal delivery)    2. Gestational hypertension, third trimester        Plan:        Diagnoses and all orders for this visit:     (spontaneous vaginal delivery)  - Doing well postpartum.  No restrictions moving forward.  - No signs of PP depression  - Breastfeeding    Gestational hypertension, third trimester  - BP normal today  - No need for medication at this time      No orders of the defined types were placed in this encounter.      No follow-ups on file.     Rossy Miles MD  Obstetrics & Gynecology      "

## 2025-07-17 ENCOUNTER — OFFICE VISIT (OUTPATIENT)
Dept: URGENT CARE | Facility: CLINIC | Age: 35
End: 2025-07-17
Payer: COMMERCIAL

## 2025-07-17 VITALS
TEMPERATURE: 98 F | OXYGEN SATURATION: 98 % | HEART RATE: 74 BPM | DIASTOLIC BLOOD PRESSURE: 85 MMHG | RESPIRATION RATE: 19 BRPM | BODY MASS INDEX: 42.34 KG/M2 | WEIGHT: 263.44 LBS | HEIGHT: 66 IN | SYSTOLIC BLOOD PRESSURE: 139 MMHG

## 2025-07-17 DIAGNOSIS — H60.331 ACUTE SWIMMER'S EAR OF RIGHT SIDE: Primary | ICD-10-CM

## 2025-07-17 PROCEDURE — 99213 OFFICE O/P EST LOW 20 MIN: CPT | Mod: S$GLB,,, | Performed by: FAMILY MEDICINE

## 2025-07-17 RX ORDER — CIPROFLOXACIN AND DEXAMETHASONE 3; 1 MG/ML; MG/ML
4 SUSPENSION/ DROPS AURICULAR (OTIC) 2 TIMES DAILY
Qty: 7.5 ML | Refills: 0 | Status: SHIPPED | OUTPATIENT
Start: 2025-07-17 | End: 2025-07-24

## 2025-07-17 NOTE — PROGRESS NOTES
"Subjective:      Patient ID: Bozena Queen is a 34 y.o. female.    Vitals:  height is 5' 6" (1.676 m) and weight is 119.5 kg (263 lb 7.2 oz). Her oral temperature is 98.4 °F (36.9 °C). Her blood pressure is 139/85 and her pulse is 74. Her respiration is 19 and oxygen saturation is 98%.     Chief Complaint: Ear Problem     Bozena Queen is a 34 y.o. female who presents today with a chief complaint of pain in her right ear that began 3 days ago.  Patient took OTC ibuprofen & home wax cleaning w/ no relief to symptoms.          Otalgia   There is pain in the right ear. This is a new problem. The current episode started in the past 7 days. The problem occurs constantly. The problem has been gradually worsening. There has been no fever. The pain is at a severity of 5/10. The pain is moderate. Associated symptoms include hearing loss. Pertinent negatives include no abdominal pain, coughing, diarrhea, ear discharge, headaches, neck pain, rash, rhinorrhea, sore throat or vomiting. She has tried ear drops and NSAIDs for the symptoms. The treatment provided no relief.       HENT:  Positive for ear pain and hearing loss. Negative for ear discharge and sore throat.    Neck: Negative for neck pain.   Respiratory:  Negative for cough.    Gastrointestinal:  Negative for abdominal pain, vomiting and diarrhea.   Skin:  Negative for rash.   Neurological:  Negative for headaches.      Objective:     Physical Exam  Constitutional: Pt oriented to person, place, and time.  Non-toxic appearance.   Patient does not appear ill. No distress. normal  HENT: No icterus or facial swelling appreciated  Head: Normocephalic and atraumatic.   Nose: No congestion.   Ears:  Left: TM without erythema, bulging or retraction. EAC without drainage or debris/cerumen impaction or swelling, external ear structures normal  Right: TM without erythema, bulging or retraction. EAC with mild swelling and erythema. No TTP overlying mastoid process.  external ear " structures normal    Pulmonary/Chest: Effort normal. No stridor. No respiratory distress.   Abdominal: Normal appearance. Abdomen exhibits no distension.   Musculoskeletal:         General: No swelling.   Neurological: no focal deficit. Patient is alert and oriented to person, place, and time.   Skin: Skin is not diaphoretic and not pale. no jaundice  Psychiatric: Patients behavior is normal. Mood, judgment and thought content normal.     Assessment:     1. Acute swimmer's ear of right side        Plan:       Acute swimmer's ear of right side  -     ciprofloxacin-dexAMETHasone 0.3-0.1% (CIPRODEX) 0.3-0.1 % DrpS; Place 4 drops into both ears 2 (two) times daily. for 7 days  Dispense: 7.5 mL; Refill: 0    Tylenol / motrin prn pain

## 2025-07-18 ENCOUNTER — PATIENT OUTREACH (OUTPATIENT)
Dept: ADMINISTRATIVE | Facility: HOSPITAL | Age: 35
End: 2025-07-18
Payer: COMMERCIAL

## 2025-07-18 VITALS — DIASTOLIC BLOOD PRESSURE: 85 MMHG | SYSTOLIC BLOOD PRESSURE: 139 MMHG

## 2025-07-18 NOTE — PROGRESS NOTES
Care Coordination Encounter Details:       MyChart Portal Status:         [x]  Reviewed MyChart Portal Status offered / enrolled if applicable        Additional Notes:     MyChart Outcomes: Pt is enrolled & active          Updates Requested / Reviewed:        Updated Care Coordination Note, Care Everywhere, Care Team Updated, and Immunizations Reconciliation Completed or Queried: [place]         Health Maintenance Screening(s) Due:      Health Maintenance Topics Overdue:      VBHM Score: 1                             Health Maintenance Topic(s) Outreach Outcomes & Actions Taken:    Blood Pressure - Outreach Outcomes & Actions Taken  : Remote Blood Pressure Reading Captured        Chronic Disease Management:     Diabetes Measures        Lab Results   Component Value Date    HGBA1C 5.2 08/15/2024           [x]  Reviewed chart for active Diabetes diagnosis     []  Scheduled necessary follow up appointments if needed         Additional Notes:             Hypertension Measures        BP Readings from Last 1 Encounters:   07/17/25 139/85           [x]  Reviewed chart for active Hypertension diagnosis     []  Reviewed & documented Home BP Cuff     []  Documented a Remote BP if needed & applicable     []  Scheduled necessary follow up appointments with Primary Care if needed         Additional Notes:             Provider Team Continuity:     Last PCP Visit Date: 11/3/2023          [x]  Reviewed Primary Care Provider Visits, Annual Wellness Visit, and Future          Appointments to ensure appointments have been scheduled and/or           completed        Additional Notes:             Social Determinants of Health          [x]  Reviewed, completed, and/or updated the following sections:                  Food Insecurity, Transportation Needs, Financial Resource Strain,                 Tobacco Use        Additional Notes: